# Patient Record
Sex: MALE | Race: WHITE | Employment: OTHER | URBAN - METROPOLITAN AREA
[De-identification: names, ages, dates, MRNs, and addresses within clinical notes are randomized per-mention and may not be internally consistent; named-entity substitution may affect disease eponyms.]

---

## 2019-03-13 ENCOUNTER — APPOINTMENT (OUTPATIENT)
Dept: GENERAL RADIOLOGY | Age: 79
DRG: 193 | End: 2019-03-13
Payer: MEDICARE

## 2019-03-13 ENCOUNTER — HOSPITAL ENCOUNTER (INPATIENT)
Age: 79
LOS: 9 days | Discharge: NURSING, REHAB OR LONG-TERM CARE | DRG: 193 | End: 2019-03-23
Attending: EMERGENCY MEDICINE | Admitting: INTERNAL MEDICINE
Payer: MEDICARE

## 2019-03-13 ENCOUNTER — APPOINTMENT (OUTPATIENT)
Dept: CT IMAGING | Age: 79
DRG: 193 | End: 2019-03-13
Payer: MEDICARE

## 2019-03-13 DIAGNOSIS — B34.9 VIRAL SYNDROME: Primary | ICD-10-CM

## 2019-03-13 DIAGNOSIS — R53.1 GENERAL WEAKNESS: ICD-10-CM

## 2019-03-13 LAB
ALBUMIN SERPL-MCNC: 2.5 G/DL (ref 3.5–5.2)
ALP BLD-CCNC: 162 U/L (ref 40–129)
ALT SERPL-CCNC: 20 U/L (ref 0–40)
AMMONIA: 20 UMOL/L (ref 16–60)
ANION GAP SERPL CALCULATED.3IONS-SCNC: 13 MMOL/L (ref 7–16)
APTT: 33.6 SEC (ref 24.5–35.1)
AST SERPL-CCNC: 11 U/L (ref 0–39)
BACTERIA: ABNORMAL /HPF
BASOPHILS ABSOLUTE: 0.06 E9/L (ref 0–0.2)
BASOPHILS RELATIVE PERCENT: 0.3 % (ref 0–2)
BILIRUB SERPL-MCNC: 0.5 MG/DL (ref 0–1.2)
BILIRUBIN URINE: NEGATIVE
BLOOD, URINE: ABNORMAL
BUN BLDV-MCNC: 41 MG/DL (ref 8–23)
CALCIUM SERPL-MCNC: 9.4 MG/DL (ref 8.6–10.2)
CHLORIDE BLD-SCNC: 93 MMOL/L (ref 98–107)
CLARITY: CLEAR
CO2: 26 MMOL/L (ref 22–29)
COLOR: YELLOW
CREAT SERPL-MCNC: 1 MG/DL (ref 0.7–1.2)
EOSINOPHILS ABSOLUTE: 0.14 E9/L (ref 0.05–0.5)
EOSINOPHILS RELATIVE PERCENT: 0.7 % (ref 0–6)
EPITHELIAL CELLS, UA: ABNORMAL /HPF
GFR AFRICAN AMERICAN: >60
GFR NON-AFRICAN AMERICAN: >60 ML/MIN/1.73
GLUCOSE BLD-MCNC: 159 MG/DL (ref 74–99)
GLUCOSE URINE: NEGATIVE MG/DL
HCT VFR BLD CALC: 38.1 % (ref 37–54)
HEMOGLOBIN: 12.9 G/DL (ref 12.5–16.5)
IMMATURE GRANULOCYTES #: 0.25 E9/L
IMMATURE GRANULOCYTES %: 1.3 % (ref 0–5)
INFLUENZA A BY PCR: NOT DETECTED
INFLUENZA B BY PCR: NOT DETECTED
INR BLD: 1.2
KETONES, URINE: NEGATIVE MG/DL
LACTIC ACID: 1.8 MMOL/L (ref 0.5–2.2)
LEUKOCYTE ESTERASE, URINE: NEGATIVE
LYMPHOCYTES ABSOLUTE: 0.89 E9/L (ref 1.5–4)
LYMPHOCYTES RELATIVE PERCENT: 4.7 % (ref 20–42)
MCH RBC QN AUTO: 27.4 PG (ref 26–35)
MCHC RBC AUTO-ENTMCNC: 33.9 % (ref 32–34.5)
MCV RBC AUTO: 81.1 FL (ref 80–99.9)
MONOCYTES ABSOLUTE: 1.33 E9/L (ref 0.1–0.95)
MONOCYTES RELATIVE PERCENT: 7 % (ref 2–12)
NEUTROPHILS ABSOLUTE: 16.44 E9/L (ref 1.8–7.3)
NEUTROPHILS RELATIVE PERCENT: 86 % (ref 43–80)
NITRITE, URINE: NEGATIVE
PDW BLD-RTO: 14.6 FL (ref 11.5–15)
PH UA: 6 (ref 5–9)
PLATELET # BLD: 238 E9/L (ref 130–450)
PMV BLD AUTO: 10.9 FL (ref 7–12)
POTASSIUM SERPL-SCNC: 4.8 MMOL/L (ref 3.5–5)
PRO-BNP: 1940 PG/ML (ref 0–450)
PROTEIN UA: 100 MG/DL
PROTHROMBIN TIME: 12.9 SEC (ref 9.3–12.4)
RBC # BLD: 4.7 E12/L (ref 3.8–5.8)
RBC UA: ABNORMAL /HPF (ref 0–2)
SODIUM BLD-SCNC: 132 MMOL/L (ref 132–146)
SPECIFIC GRAVITY UA: 1.02 (ref 1–1.03)
TOTAL PROTEIN: 8.3 G/DL (ref 6.4–8.3)
TROPONIN: <0.01 NG/ML (ref 0–0.03)
UROBILINOGEN, URINE: 0.2 E.U./DL
WBC # BLD: 19.1 E9/L (ref 4.5–11.5)
WBC UA: ABNORMAL /HPF (ref 0–5)

## 2019-03-13 PROCEDURE — 87798 DETECT AGENT NOS DNA AMP: CPT

## 2019-03-13 PROCEDURE — 93005 ELECTROCARDIOGRAM TRACING: CPT | Performed by: EMERGENCY MEDICINE

## 2019-03-13 PROCEDURE — 87077 CULTURE AEROBIC IDENTIFY: CPT

## 2019-03-13 PROCEDURE — 87581 M.PNEUMON DNA AMP PROBE: CPT

## 2019-03-13 PROCEDURE — 36415 COLL VENOUS BLD VENIPUNCTURE: CPT

## 2019-03-13 PROCEDURE — 87502 INFLUENZA DNA AMP PROBE: CPT

## 2019-03-13 PROCEDURE — 87040 BLOOD CULTURE FOR BACTERIA: CPT

## 2019-03-13 PROCEDURE — 83605 ASSAY OF LACTIC ACID: CPT

## 2019-03-13 PROCEDURE — 6360000002 HC RX W HCPCS: Performed by: EMERGENCY MEDICINE

## 2019-03-13 PROCEDURE — 85730 THROMBOPLASTIN TIME PARTIAL: CPT

## 2019-03-13 PROCEDURE — 87186 SC STD MICRODIL/AGAR DIL: CPT

## 2019-03-13 PROCEDURE — 6360000002 HC RX W HCPCS: Performed by: INTERNAL MEDICINE

## 2019-03-13 PROCEDURE — 84484 ASSAY OF TROPONIN QUANT: CPT

## 2019-03-13 PROCEDURE — 96372 THER/PROPH/DIAG INJ SC/IM: CPT

## 2019-03-13 PROCEDURE — G0378 HOSPITAL OBSERVATION PER HR: HCPCS

## 2019-03-13 PROCEDURE — 96374 THER/PROPH/DIAG INJ IV PUSH: CPT

## 2019-03-13 PROCEDURE — 2580000003 HC RX 258: Performed by: EMERGENCY MEDICINE

## 2019-03-13 PROCEDURE — 96365 THER/PROPH/DIAG IV INF INIT: CPT

## 2019-03-13 PROCEDURE — 96375 TX/PRO/DX INJ NEW DRUG ADDON: CPT

## 2019-03-13 PROCEDURE — 87153 DNA/RNA SEQUENCING: CPT

## 2019-03-13 PROCEDURE — 87633 RESP VIRUS 12-25 TARGETS: CPT

## 2019-03-13 PROCEDURE — 99285 EMERGENCY DEPT VISIT HI MDM: CPT

## 2019-03-13 PROCEDURE — 87088 URINE BACTERIA CULTURE: CPT

## 2019-03-13 PROCEDURE — 2500000003 HC RX 250 WO HCPCS: Performed by: INTERNAL MEDICINE

## 2019-03-13 PROCEDURE — 85610 PROTHROMBIN TIME: CPT

## 2019-03-13 PROCEDURE — 80053 COMPREHEN METABOLIC PANEL: CPT

## 2019-03-13 PROCEDURE — 71045 X-RAY EXAM CHEST 1 VIEW: CPT

## 2019-03-13 PROCEDURE — 2580000003 HC RX 258: Performed by: INTERNAL MEDICINE

## 2019-03-13 PROCEDURE — 85025 COMPLETE CBC W/AUTO DIFF WBC: CPT

## 2019-03-13 PROCEDURE — 83880 ASSAY OF NATRIURETIC PEPTIDE: CPT

## 2019-03-13 PROCEDURE — 6370000000 HC RX 637 (ALT 250 FOR IP): Performed by: EMERGENCY MEDICINE

## 2019-03-13 PROCEDURE — 87015 SPECIMEN INFECT AGNT CONCNTJ: CPT

## 2019-03-13 PROCEDURE — 81001 URINALYSIS AUTO W/SCOPE: CPT

## 2019-03-13 PROCEDURE — 87486 CHLMYD PNEUM DNA AMP PROBE: CPT

## 2019-03-13 PROCEDURE — 82140 ASSAY OF AMMONIA: CPT

## 2019-03-13 PROCEDURE — 94640 AIRWAY INHALATION TREATMENT: CPT

## 2019-03-13 PROCEDURE — 70450 CT HEAD/BRAIN W/O DYE: CPT

## 2019-03-13 RX ORDER — ONDANSETRON 2 MG/ML
4 INJECTION INTRAMUSCULAR; INTRAVENOUS ONCE
Status: COMPLETED | OUTPATIENT
Start: 2019-03-13 | End: 2019-03-13

## 2019-03-13 RX ORDER — SODIUM CHLORIDE 9 MG/ML
INJECTION, SOLUTION INTRAVENOUS CONTINUOUS
Status: DISCONTINUED | OUTPATIENT
Start: 2019-03-13 | End: 2019-03-13

## 2019-03-13 RX ORDER — 0.9 % SODIUM CHLORIDE 0.9 %
500 INTRAVENOUS SOLUTION INTRAVENOUS ONCE
Status: DISCONTINUED | OUTPATIENT
Start: 2019-03-13 | End: 2019-03-13

## 2019-03-13 RX ORDER — DEXTROSE, SODIUM CHLORIDE, AND POTASSIUM CHLORIDE 5; .45; .075 G/100ML; G/100ML; G/100ML
INJECTION INTRAVENOUS CONTINUOUS
Status: DISCONTINUED | OUTPATIENT
Start: 2019-03-13 | End: 2019-03-15

## 2019-03-13 RX ORDER — 0.9 % SODIUM CHLORIDE 0.9 %
1000 INTRAVENOUS SOLUTION INTRAVENOUS ONCE
Status: COMPLETED | OUTPATIENT
Start: 2019-03-13 | End: 2019-03-13

## 2019-03-13 RX ORDER — SODIUM CHLORIDE 0.9 % (FLUSH) 0.9 %
10 SYRINGE (ML) INJECTION PRN
Status: DISCONTINUED | OUTPATIENT
Start: 2019-03-13 | End: 2019-03-23 | Stop reason: HOSPADM

## 2019-03-13 RX ORDER — IPRATROPIUM BROMIDE AND ALBUTEROL SULFATE 2.5; .5 MG/3ML; MG/3ML
2 SOLUTION RESPIRATORY (INHALATION) ONCE
Status: COMPLETED | OUTPATIENT
Start: 2019-03-13 | End: 2019-03-13

## 2019-03-13 RX ORDER — ACETAMINOPHEN 325 MG/1
650 TABLET ORAL EVERY 4 HOURS PRN
Status: DISCONTINUED | OUTPATIENT
Start: 2019-03-13 | End: 2019-03-18 | Stop reason: SDUPTHER

## 2019-03-13 RX ORDER — ACETAMINOPHEN 500 MG
500 TABLET ORAL EVERY 6 HOURS PRN
Status: DISCONTINUED | OUTPATIENT
Start: 2019-03-13 | End: 2019-03-23 | Stop reason: HOSPADM

## 2019-03-13 RX ORDER — SODIUM CHLORIDE 0.9 % (FLUSH) 0.9 %
10 SYRINGE (ML) INJECTION EVERY 12 HOURS SCHEDULED
Status: DISCONTINUED | OUTPATIENT
Start: 2019-03-13 | End: 2019-03-23 | Stop reason: HOSPADM

## 2019-03-13 RX ORDER — ONDANSETRON 2 MG/ML
4 INJECTION INTRAMUSCULAR; INTRAVENOUS EVERY 6 HOURS PRN
Status: DISCONTINUED | OUTPATIENT
Start: 2019-03-13 | End: 2019-03-23 | Stop reason: HOSPADM

## 2019-03-13 RX ADMIN — IPRATROPIUM BROMIDE AND ALBUTEROL SULFATE 2 AMPULE: .5; 3 SOLUTION RESPIRATORY (INHALATION) at 10:47

## 2019-03-13 RX ADMIN — SODIUM CHLORIDE 1000 ML: 9 INJECTION, SOLUTION INTRAVENOUS at 13:34

## 2019-03-13 RX ADMIN — AZITHROMYCIN MONOHYDRATE 500 MG: 500 INJECTION, POWDER, LYOPHILIZED, FOR SOLUTION INTRAVENOUS at 15:14

## 2019-03-13 RX ADMIN — POTASSIUM CHLORIDE, DEXTROSE MONOHYDRATE AND SODIUM CHLORIDE: 75; 5; 450 INJECTION, SOLUTION INTRAVENOUS at 21:17

## 2019-03-13 RX ADMIN — ENOXAPARIN SODIUM 40 MG: 40 INJECTION SUBCUTANEOUS at 19:20

## 2019-03-13 RX ADMIN — SODIUM CHLORIDE 1000 ML: 9 INJECTION, SOLUTION INTRAVENOUS at 11:26

## 2019-03-13 RX ADMIN — Medication 10 ML: at 21:20

## 2019-03-13 RX ADMIN — Medication 10 ML: at 17:31

## 2019-03-13 RX ADMIN — ONDANSETRON 4 MG: 2 INJECTION INTRAMUSCULAR; INTRAVENOUS at 11:27

## 2019-03-13 RX ADMIN — WATER 2 G: 1 INJECTION INTRAMUSCULAR; INTRAVENOUS; SUBCUTANEOUS at 17:30

## 2019-03-13 SDOH — HEALTH STABILITY: MENTAL HEALTH: HOW OFTEN DO YOU HAVE A DRINK CONTAINING ALCOHOL?: NEVER

## 2019-03-13 ASSESSMENT — PAIN SCALES - GENERAL
PAINLEVEL_OUTOF10: 0

## 2019-03-13 ASSESSMENT — ENCOUNTER SYMPTOMS
CHEST TIGHTNESS: 0
BACK PAIN: 0
VOMITING: 1
SHORTNESS OF BREATH: 0
SORE THROAT: 0
SINUS PRESSURE: 0
NAUSEA: 1
RHINORRHEA: 0
COUGH: 1
WHEEZING: 0
DIARRHEA: 0

## 2019-03-14 ENCOUNTER — APPOINTMENT (OUTPATIENT)
Dept: CT IMAGING | Age: 79
DRG: 193 | End: 2019-03-14
Payer: MEDICARE

## 2019-03-14 PROBLEM — J18.9 PNEUMONIA: Status: ACTIVE | Noted: 2019-03-14

## 2019-03-14 PROBLEM — E43 SEVERE PROTEIN-CALORIE MALNUTRITION (HCC): Status: ACTIVE | Noted: 2019-03-14

## 2019-03-14 LAB
ALBUMIN SERPL-MCNC: 2.2 G/DL (ref 3.5–5.2)
ALP BLD-CCNC: 126 U/L (ref 40–129)
ALT SERPL-CCNC: 13 U/L (ref 0–40)
ANION GAP SERPL CALCULATED.3IONS-SCNC: 10 MMOL/L (ref 7–16)
AST SERPL-CCNC: 11 U/L (ref 0–39)
BASOPHILS ABSOLUTE: 0.07 E9/L (ref 0–0.2)
BASOPHILS RELATIVE PERCENT: 0.4 % (ref 0–2)
BILIRUB SERPL-MCNC: 0.3 MG/DL (ref 0–1.2)
BUN BLDV-MCNC: 26 MG/DL (ref 8–23)
BURR CELLS: ABNORMAL
CALCIUM SERPL-MCNC: 8.2 MG/DL (ref 8.6–10.2)
CHLORIDE BLD-SCNC: 96 MMOL/L (ref 98–107)
CHOLESTEROL, TOTAL: 101 MG/DL (ref 0–199)
CO2: 23 MMOL/L (ref 22–29)
CREAT SERPL-MCNC: 1 MG/DL (ref 0.7–1.2)
EOSINOPHILS ABSOLUTE: 0.72 E9/L (ref 0.05–0.5)
EOSINOPHILS RELATIVE PERCENT: 4.1 % (ref 0–6)
FILM ARRAY ADENOVIRUS: NORMAL
FILM ARRAY BORDETELLA PERTUSSIS: NORMAL
FILM ARRAY CHLAMYDOPHILIA PNEUMONIAE: NORMAL
FILM ARRAY CORONAVIRUS 229E: NORMAL
FILM ARRAY CORONAVIRUS HKU1: NORMAL
FILM ARRAY CORONAVIRUS NL63: NORMAL
FILM ARRAY CORONAVIRUS OC43: NORMAL
FILM ARRAY INFLUENZA A VIRUS 09H1: NORMAL
FILM ARRAY INFLUENZA A VIRUS H1: NORMAL
FILM ARRAY INFLUENZA A VIRUS H3: NORMAL
FILM ARRAY INFLUENZA A VIRUS: NORMAL
FILM ARRAY INFLUENZA B: NORMAL
FILM ARRAY METAPNEUMOVIRUS: NORMAL
FILM ARRAY MYCOPLASMA PNEUMONIAE: NORMAL
FILM ARRAY PARAINFLUENZA VIRUS 1: NORMAL
FILM ARRAY PARAINFLUENZA VIRUS 2: NORMAL
FILM ARRAY PARAINFLUENZA VIRUS 3: NORMAL
FILM ARRAY PARAINFLUENZA VIRUS 4: NORMAL
FILM ARRAY RESPIRATORY SYNCITIAL VIRUS: NORMAL
FILM ARRAY RHINOVIRUS/ENTEROVIRUS: NORMAL
GFR AFRICAN AMERICAN: >60
GFR NON-AFRICAN AMERICAN: >60 ML/MIN/1.73
GLUCOSE BLD-MCNC: 131 MG/DL (ref 74–99)
HCT VFR BLD CALC: 32.2 % (ref 37–54)
HDLC SERPL-MCNC: 20 MG/DL
HEMOGLOBIN: 10.7 G/DL (ref 12.5–16.5)
IMMATURE GRANULOCYTES #: 0.21 E9/L
IMMATURE GRANULOCYTES %: 1.2 % (ref 0–5)
LDL CHOLESTEROL CALCULATED: 58 MG/DL (ref 0–99)
LYMPHOCYTES ABSOLUTE: 1.35 E9/L (ref 1.5–4)
LYMPHOCYTES RELATIVE PERCENT: 7.7 % (ref 20–42)
MCH RBC QN AUTO: 27.3 PG (ref 26–35)
MCHC RBC AUTO-ENTMCNC: 33.2 % (ref 32–34.5)
MCV RBC AUTO: 82.1 FL (ref 80–99.9)
MONOCYTES ABSOLUTE: 1.95 E9/L (ref 0.1–0.95)
MONOCYTES RELATIVE PERCENT: 11.1 % (ref 2–12)
NEUTROPHILS ABSOLUTE: 13.3 E9/L (ref 1.8–7.3)
NEUTROPHILS RELATIVE PERCENT: 75.5 % (ref 43–80)
PDW BLD-RTO: 14.8 FL (ref 11.5–15)
PLATELET # BLD: 255 E9/L (ref 130–450)
PMV BLD AUTO: 10.4 FL (ref 7–12)
POIKILOCYTES: ABNORMAL
POTASSIUM SERPL-SCNC: 4.2 MMOL/L (ref 3.5–5)
RBC # BLD: 3.92 E12/L (ref 3.8–5.8)
SODIUM BLD-SCNC: 129 MMOL/L (ref 132–146)
TARGET CELLS: ABNORMAL
TOTAL PROTEIN: 6.7 G/DL (ref 6.4–8.3)
TRIGL SERPL-MCNC: 113 MG/DL (ref 0–149)
TSH SERPL DL<=0.05 MIU/L-ACNC: 0.75 UIU/ML (ref 0.27–4.2)
VLDLC SERPL CALC-MCNC: 23 MG/DL
WBC # BLD: 17.6 E9/L (ref 4.5–11.5)

## 2019-03-14 PROCEDURE — 85025 COMPLETE CBC W/AUTO DIFF WBC: CPT

## 2019-03-14 PROCEDURE — 74177 CT ABD & PELVIS W/CONTRAST: CPT

## 2019-03-14 PROCEDURE — 97162 PT EVAL MOD COMPLEX 30 MIN: CPT | Performed by: PHYSICAL THERAPIST

## 2019-03-14 PROCEDURE — 6360000002 HC RX W HCPCS: Performed by: INTERNAL MEDICINE

## 2019-03-14 PROCEDURE — 36415 COLL VENOUS BLD VENIPUNCTURE: CPT

## 2019-03-14 PROCEDURE — 80053 COMPREHEN METABOLIC PANEL: CPT

## 2019-03-14 PROCEDURE — 6360000004 HC RX CONTRAST MEDICATION: Performed by: RADIOLOGY

## 2019-03-14 PROCEDURE — 2500000003 HC RX 250 WO HCPCS: Performed by: INTERNAL MEDICINE

## 2019-03-14 PROCEDURE — 1200000000 HC SEMI PRIVATE

## 2019-03-14 PROCEDURE — 97530 THERAPEUTIC ACTIVITIES: CPT

## 2019-03-14 PROCEDURE — 97165 OT EVAL LOW COMPLEX 30 MIN: CPT

## 2019-03-14 PROCEDURE — 71260 CT THORAX DX C+: CPT

## 2019-03-14 PROCEDURE — 80061 LIPID PANEL: CPT

## 2019-03-14 PROCEDURE — 6370000000 HC RX 637 (ALT 250 FOR IP): Performed by: INTERNAL MEDICINE

## 2019-03-14 PROCEDURE — 97530 THERAPEUTIC ACTIVITIES: CPT | Performed by: PHYSICAL THERAPIST

## 2019-03-14 PROCEDURE — 2580000003 HC RX 258: Performed by: INTERNAL MEDICINE

## 2019-03-14 PROCEDURE — 84443 ASSAY THYROID STIM HORMONE: CPT

## 2019-03-14 RX ADMIN — IOHEXOL 50 ML: 240 INJECTION, SOLUTION INTRATHECAL; INTRAVASCULAR; INTRAVENOUS; ORAL at 07:18

## 2019-03-14 RX ADMIN — ENOXAPARIN SODIUM 40 MG: 40 INJECTION SUBCUTANEOUS at 12:01

## 2019-03-14 RX ADMIN — Medication 10 ML: at 21:44

## 2019-03-14 RX ADMIN — ACETAMINOPHEN 500 MG: 500 TABLET, FILM COATED ORAL at 23:56

## 2019-03-14 RX ADMIN — IOPAMIDOL 80 ML: 755 INJECTION, SOLUTION INTRAVENOUS at 07:22

## 2019-03-14 RX ADMIN — POTASSIUM CHLORIDE, DEXTROSE MONOHYDRATE AND SODIUM CHLORIDE: 75; 5; 450 INJECTION, SOLUTION INTRAVENOUS at 05:55

## 2019-03-14 RX ADMIN — POTASSIUM CHLORIDE, DEXTROSE MONOHYDRATE AND SODIUM CHLORIDE: 75; 5; 450 INJECTION, SOLUTION INTRAVENOUS at 19:18

## 2019-03-14 RX ADMIN — WATER 1 G: 1 INJECTION INTRAMUSCULAR; INTRAVENOUS; SUBCUTANEOUS at 13:35

## 2019-03-14 ASSESSMENT — PAIN SCALES - GENERAL
PAINLEVEL_OUTOF10: 10
PAINLEVEL_OUTOF10: 0
PAINLEVEL_OUTOF10: 0

## 2019-03-14 ASSESSMENT — PAIN DESCRIPTION - ORIENTATION: ORIENTATION: RIGHT;LEFT

## 2019-03-14 ASSESSMENT — PAIN DESCRIPTION - LOCATION: LOCATION: KNEE

## 2019-03-15 LAB
ANION GAP SERPL CALCULATED.3IONS-SCNC: 12 MMOL/L (ref 7–16)
BASOPHILS ABSOLUTE: 0 E9/L (ref 0–0.2)
BASOPHILS RELATIVE PERCENT: 0.3 % (ref 0–2)
BUN BLDV-MCNC: 22 MG/DL (ref 8–23)
BURR CELLS: ABNORMAL
CALCIUM SERPL-MCNC: 8.5 MG/DL (ref 8.6–10.2)
CHLORIDE BLD-SCNC: 96 MMOL/L (ref 98–107)
CO2: 22 MMOL/L (ref 22–29)
CREAT SERPL-MCNC: 0.9 MG/DL (ref 0.7–1.2)
EOSINOPHILS ABSOLUTE: 0.83 E9/L (ref 0.05–0.5)
EOSINOPHILS RELATIVE PERCENT: 4.3 % (ref 0–6)
GFR AFRICAN AMERICAN: >60
GFR NON-AFRICAN AMERICAN: >60 ML/MIN/1.73
GLUCOSE BLD-MCNC: 121 MG/DL (ref 74–99)
HCT VFR BLD CALC: 33.3 % (ref 37–54)
HEMOGLOBIN: 11.3 G/DL (ref 12.5–16.5)
LYMPHOCYTES ABSOLUTE: 1.15 E9/L (ref 1.5–4)
LYMPHOCYTES RELATIVE PERCENT: 6.1 % (ref 20–42)
MCH RBC QN AUTO: 27.9 PG (ref 26–35)
MCHC RBC AUTO-ENTMCNC: 33.9 % (ref 32–34.5)
MCV RBC AUTO: 82.2 FL (ref 80–99.9)
MONOCYTES ABSOLUTE: 1.54 E9/L (ref 0.1–0.95)
MONOCYTES RELATIVE PERCENT: 7.8 % (ref 2–12)
NEUTROPHILS ABSOLUTE: 15.74 E9/L (ref 1.8–7.3)
NEUTROPHILS RELATIVE PERCENT: 81.7 % (ref 43–80)
OVALOCYTES: ABNORMAL
PDW BLD-RTO: 15 FL (ref 11.5–15)
PLATELET # BLD: 279 E9/L (ref 130–450)
PMV BLD AUTO: 10.5 FL (ref 7–12)
POIKILOCYTES: ABNORMAL
POTASSIUM SERPL-SCNC: 4.2 MMOL/L (ref 3.5–5)
RBC # BLD: 4.05 E12/L (ref 3.8–5.8)
SODIUM BLD-SCNC: 130 MMOL/L (ref 132–146)
TARGET CELLS: ABNORMAL
TEAR DROP CELLS: ABNORMAL
URINE CULTURE, ROUTINE: NORMAL
WBC # BLD: 19.2 E9/L (ref 4.5–11.5)

## 2019-03-15 PROCEDURE — 36415 COLL VENOUS BLD VENIPUNCTURE: CPT

## 2019-03-15 PROCEDURE — 80048 BASIC METABOLIC PNL TOTAL CA: CPT

## 2019-03-15 PROCEDURE — 97530 THERAPEUTIC ACTIVITIES: CPT

## 2019-03-15 PROCEDURE — 2580000003 HC RX 258: Performed by: INTERNAL MEDICINE

## 2019-03-15 PROCEDURE — 1200000000 HC SEMI PRIVATE

## 2019-03-15 PROCEDURE — 97116 GAIT TRAINING THERAPY: CPT

## 2019-03-15 PROCEDURE — 6360000002 HC RX W HCPCS: Performed by: INTERNAL MEDICINE

## 2019-03-15 PROCEDURE — 85025 COMPLETE CBC W/AUTO DIFF WBC: CPT

## 2019-03-15 PROCEDURE — 6370000000 HC RX 637 (ALT 250 FOR IP): Performed by: INTERNAL MEDICINE

## 2019-03-15 RX ORDER — LEVOFLOXACIN 500 MG/1
500 TABLET, FILM COATED ORAL
Status: DISCONTINUED | OUTPATIENT
Start: 2019-03-15 | End: 2019-03-17

## 2019-03-15 RX ORDER — ATENOLOL 25 MG/1
25 TABLET ORAL DAILY
Status: DISCONTINUED | OUTPATIENT
Start: 2019-03-15 | End: 2019-03-16

## 2019-03-15 RX ORDER — AMLODIPINE BESYLATE 5 MG/1
2.5 TABLET ORAL DAILY
Status: DISCONTINUED | OUTPATIENT
Start: 2019-03-15 | End: 2019-03-16

## 2019-03-15 RX ADMIN — ATENOLOL 25 MG: 25 TABLET ORAL at 15:58

## 2019-03-15 RX ADMIN — AMLODIPINE BESYLATE 2.5 MG: 5 TABLET ORAL at 15:58

## 2019-03-15 RX ADMIN — ENOXAPARIN SODIUM 40 MG: 40 INJECTION SUBCUTANEOUS at 08:32

## 2019-03-15 RX ADMIN — LEVOFLOXACIN 500 MG: 500 TABLET, FILM COATED ORAL at 16:01

## 2019-03-15 RX ADMIN — Medication 10 ML: at 22:52

## 2019-03-15 ASSESSMENT — PAIN SCALES - GENERAL: PAINLEVEL_OUTOF10: 0

## 2019-03-16 ENCOUNTER — APPOINTMENT (OUTPATIENT)
Dept: GENERAL RADIOLOGY | Age: 79
DRG: 193 | End: 2019-03-16
Payer: MEDICARE

## 2019-03-16 LAB
ANION GAP SERPL CALCULATED.3IONS-SCNC: 13 MMOL/L (ref 7–16)
BASOPHILS ABSOLUTE: 0 E9/L (ref 0–0.2)
BASOPHILS RELATIVE PERCENT: 0.4 % (ref 0–2)
BUN BLDV-MCNC: 32 MG/DL (ref 8–23)
CALCIUM SERPL-MCNC: 8.5 MG/DL (ref 8.6–10.2)
CHLORIDE BLD-SCNC: 97 MMOL/L (ref 98–107)
CO2: 21 MMOL/L (ref 22–29)
CREAT SERPL-MCNC: 1.1 MG/DL (ref 0.7–1.2)
EKG ATRIAL RATE: 76 BPM
EKG P AXIS: 43 DEGREES
EKG P-R INTERVAL: 116 MS
EKG Q-T INTERVAL: 398 MS
EKG QRS DURATION: 92 MS
EKG QTC CALCULATION (BAZETT): 447 MS
EKG R AXIS: 36 DEGREES
EKG T AXIS: 48 DEGREES
EKG VENTRICULAR RATE: 76 BPM
EOSINOPHILS ABSOLUTE: 0.36 E9/L (ref 0.05–0.5)
EOSINOPHILS RELATIVE PERCENT: 1.8 % (ref 0–6)
GFR AFRICAN AMERICAN: >60
GFR NON-AFRICAN AMERICAN: >60 ML/MIN/1.73
GLUCOSE BLD-MCNC: 109 MG/DL (ref 74–99)
HCT VFR BLD CALC: 34.7 % (ref 37–54)
HEMOGLOBIN: 11.7 G/DL (ref 12.5–16.5)
LYMPHOCYTES ABSOLUTE: 1 E9/L (ref 1.5–4)
LYMPHOCYTES RELATIVE PERCENT: 5.3 % (ref 20–42)
MCH RBC QN AUTO: 27.6 PG (ref 26–35)
MCHC RBC AUTO-ENTMCNC: 33.7 % (ref 32–34.5)
MCV RBC AUTO: 81.8 FL (ref 80–99.9)
MONOCYTES ABSOLUTE: 1.4 E9/L (ref 0.1–0.95)
MONOCYTES RELATIVE PERCENT: 7 % (ref 2–12)
MYELOCYTE PERCENT: 1.8 % (ref 0–0)
NEUTROPHILS ABSOLUTE: 17.2 E9/L (ref 1.8–7.3)
NEUTROPHILS RELATIVE PERCENT: 84.2 % (ref 43–80)
NUCLEATED RED BLOOD CELLS: 0.9 /100 WBC
PDW BLD-RTO: 14.8 FL (ref 11.5–15)
PLATELET # BLD: 329 E9/L (ref 130–450)
PMV BLD AUTO: 10.2 FL (ref 7–12)
POTASSIUM SERPL-SCNC: 4.7 MMOL/L (ref 3.5–5)
RBC # BLD: 4.24 E12/L (ref 3.8–5.8)
SODIUM BLD-SCNC: 131 MMOL/L (ref 132–146)
WBC # BLD: 20 E9/L (ref 4.5–11.5)

## 2019-03-16 PROCEDURE — 80048 BASIC METABOLIC PNL TOTAL CA: CPT

## 2019-03-16 PROCEDURE — 2580000003 HC RX 258: Performed by: INTERNAL MEDICINE

## 2019-03-16 PROCEDURE — 1200000000 HC SEMI PRIVATE

## 2019-03-16 PROCEDURE — 6360000002 HC RX W HCPCS: Performed by: INTERNAL MEDICINE

## 2019-03-16 PROCEDURE — 85025 COMPLETE CBC W/AUTO DIFF WBC: CPT

## 2019-03-16 PROCEDURE — 72050 X-RAY EXAM NECK SPINE 4/5VWS: CPT

## 2019-03-16 PROCEDURE — 6370000000 HC RX 637 (ALT 250 FOR IP): Performed by: INTERNAL MEDICINE

## 2019-03-16 PROCEDURE — 36415 COLL VENOUS BLD VENIPUNCTURE: CPT

## 2019-03-16 RX ORDER — AMLODIPINE BESYLATE 5 MG/1
5 TABLET ORAL DAILY
Status: DISCONTINUED | OUTPATIENT
Start: 2019-03-17 | End: 2019-03-17

## 2019-03-16 RX ORDER — ATENOLOL 25 MG/1
37.5 TABLET ORAL DAILY
Status: DISCONTINUED | OUTPATIENT
Start: 2019-03-17 | End: 2019-03-23 | Stop reason: HOSPADM

## 2019-03-16 RX ADMIN — ENOXAPARIN SODIUM 40 MG: 40 INJECTION SUBCUTANEOUS at 09:42

## 2019-03-16 RX ADMIN — WATER 1 G: 1 INJECTION INTRAMUSCULAR; INTRAVENOUS; SUBCUTANEOUS at 15:34

## 2019-03-16 RX ADMIN — Medication 10 ML: at 13:48

## 2019-03-16 RX ADMIN — Medication 10 ML: at 21:18

## 2019-03-16 RX ADMIN — AMLODIPINE BESYLATE 2.5 MG: 5 TABLET ORAL at 09:42

## 2019-03-16 RX ADMIN — ATENOLOL 25 MG: 25 TABLET ORAL at 09:42

## 2019-03-16 ASSESSMENT — PAIN SCALES - GENERAL
PAINLEVEL_OUTOF10: 0
PAINLEVEL_OUTOF10: 0

## 2019-03-17 LAB
BASOPHILS ABSOLUTE: 0 E9/L (ref 0–0.2)
BASOPHILS RELATIVE PERCENT: 0.3 % (ref 0–2)
BURR CELLS: ABNORMAL
EOSINOPHILS ABSOLUTE: 0.36 E9/L (ref 0.05–0.5)
EOSINOPHILS RELATIVE PERCENT: 1.8 % (ref 0–6)
HCT VFR BLD CALC: 36.1 % (ref 37–54)
HEMOGLOBIN: 12.2 G/DL (ref 12.5–16.5)
LYMPHOCYTES ABSOLUTE: 1.41 E9/L (ref 1.5–4)
LYMPHOCYTES RELATIVE PERCENT: 7 % (ref 20–42)
MCH RBC QN AUTO: 28.2 PG (ref 26–35)
MCHC RBC AUTO-ENTMCNC: 33.8 % (ref 32–34.5)
MCV RBC AUTO: 83.4 FL (ref 80–99.9)
MONOCYTES ABSOLUTE: 1.21 E9/L (ref 0.1–0.95)
MONOCYTES RELATIVE PERCENT: 6.1 % (ref 2–12)
MYELOCYTE PERCENT: 0.9 % (ref 0–0)
NEUTROPHILS ABSOLUTE: 17.09 E9/L (ref 1.8–7.3)
NEUTROPHILS RELATIVE PERCENT: 84.2 % (ref 43–80)
OVALOCYTES: ABNORMAL
PDW BLD-RTO: 15.1 FL (ref 11.5–15)
PLATELET # BLD: 379 E9/L (ref 130–450)
PMV BLD AUTO: 10.1 FL (ref 7–12)
POIKILOCYTES: ABNORMAL
RBC # BLD: 4.33 E12/L (ref 3.8–5.8)
SEDIMENTATION RATE, ERYTHROCYTE: 87 MM/HR (ref 0–15)
WBC # BLD: 20.1 E9/L (ref 4.5–11.5)

## 2019-03-17 PROCEDURE — 36415 COLL VENOUS BLD VENIPUNCTURE: CPT

## 2019-03-17 PROCEDURE — 85651 RBC SED RATE NONAUTOMATED: CPT

## 2019-03-17 PROCEDURE — 85025 COMPLETE CBC W/AUTO DIFF WBC: CPT

## 2019-03-17 PROCEDURE — 6370000000 HC RX 637 (ALT 250 FOR IP): Performed by: INTERNAL MEDICINE

## 2019-03-17 PROCEDURE — 2580000003 HC RX 258: Performed by: INTERNAL MEDICINE

## 2019-03-17 PROCEDURE — 1200000000 HC SEMI PRIVATE

## 2019-03-17 PROCEDURE — 6360000002 HC RX W HCPCS: Performed by: INTERNAL MEDICINE

## 2019-03-17 RX ORDER — AMLODIPINE BESYLATE 5 MG/1
5 TABLET ORAL ONCE
Status: COMPLETED | OUTPATIENT
Start: 2019-03-17 | End: 2019-03-17

## 2019-03-17 RX ORDER — LEVOFLOXACIN 500 MG/1
500 TABLET, FILM COATED ORAL DAILY
Status: DISCONTINUED | OUTPATIENT
Start: 2019-03-18 | End: 2019-03-23 | Stop reason: HOSPADM

## 2019-03-17 RX ORDER — DOCUSATE SODIUM 100 MG/1
100 CAPSULE, LIQUID FILLED ORAL DAILY
Status: DISCONTINUED | OUTPATIENT
Start: 2019-03-17 | End: 2019-03-23 | Stop reason: HOSPADM

## 2019-03-17 RX ORDER — AMLODIPINE BESYLATE 10 MG/1
10 TABLET ORAL DAILY
Status: DISCONTINUED | OUTPATIENT
Start: 2019-03-18 | End: 2019-03-23 | Stop reason: HOSPADM

## 2019-03-17 RX ADMIN — Medication 10 ML: at 09:19

## 2019-03-17 RX ADMIN — DOCUSATE SODIUM 100 MG: 100 CAPSULE, LIQUID FILLED ORAL at 22:06

## 2019-03-17 RX ADMIN — ATENOLOL 37.5 MG: 25 TABLET ORAL at 09:18

## 2019-03-17 RX ADMIN — ENOXAPARIN SODIUM 40 MG: 40 INJECTION SUBCUTANEOUS at 09:19

## 2019-03-17 RX ADMIN — Medication 10 ML: at 19:46

## 2019-03-17 RX ADMIN — AMLODIPINE BESYLATE 5 MG: 5 TABLET ORAL at 14:54

## 2019-03-17 RX ADMIN — AMLODIPINE BESYLATE 5 MG: 5 TABLET ORAL at 09:19

## 2019-03-17 RX ADMIN — Medication 10 ML: at 15:05

## 2019-03-17 RX ADMIN — WATER 1 G: 1 INJECTION INTRAMUSCULAR; INTRAVENOUS; SUBCUTANEOUS at 15:05

## 2019-03-17 ASSESSMENT — PAIN SCALES - GENERAL
PAINLEVEL_OUTOF10: 0

## 2019-03-18 ENCOUNTER — APPOINTMENT (OUTPATIENT)
Dept: GENERAL RADIOLOGY | Age: 79
DRG: 193 | End: 2019-03-18
Payer: MEDICARE

## 2019-03-18 LAB
ANION GAP SERPL CALCULATED.3IONS-SCNC: 12 MMOL/L (ref 7–16)
BASOPHILS ABSOLUTE: 0 E9/L (ref 0–0.2)
BASOPHILS RELATIVE PERCENT: 0.4 % (ref 0–2)
BUN BLDV-MCNC: 34 MG/DL (ref 8–23)
CALCIUM SERPL-MCNC: 8.7 MG/DL (ref 8.6–10.2)
CHLORIDE BLD-SCNC: 97 MMOL/L (ref 98–107)
CO2: 23 MMOL/L (ref 22–29)
CREAT SERPL-MCNC: 1 MG/DL (ref 0.7–1.2)
CULTURE, BLOOD 2: NORMAL
EOSINOPHILS ABSOLUTE: 0 E9/L (ref 0.05–0.5)
EOSINOPHILS RELATIVE PERCENT: 1.2 % (ref 0–6)
GFR AFRICAN AMERICAN: >60
GFR NON-AFRICAN AMERICAN: >60 ML/MIN/1.73
GLUCOSE BLD-MCNC: 107 MG/DL (ref 74–99)
HCT VFR BLD CALC: 38.1 % (ref 37–54)
HEMOGLOBIN: 12.7 G/DL (ref 12.5–16.5)
LYMPHOCYTES ABSOLUTE: 1.08 E9/L (ref 1.5–4)
LYMPHOCYTES RELATIVE PERCENT: 6.1 % (ref 20–42)
MCH RBC QN AUTO: 28 PG (ref 26–35)
MCHC RBC AUTO-ENTMCNC: 33.3 % (ref 32–34.5)
MCV RBC AUTO: 83.9 FL (ref 80–99.9)
MONOCYTES ABSOLUTE: 1.08 E9/L (ref 0.1–0.95)
MONOCYTES RELATIVE PERCENT: 6.1 % (ref 2–12)
MYELOCYTE PERCENT: 1.7 % (ref 0–0)
NEUTROPHILS ABSOLUTE: 15.84 E9/L (ref 1.8–7.3)
NEUTROPHILS RELATIVE PERCENT: 86.1 % (ref 43–80)
PDW BLD-RTO: 15 FL (ref 11.5–15)
PLATELET # BLD: 397 E9/L (ref 130–450)
PMV BLD AUTO: 9.9 FL (ref 7–12)
POTASSIUM SERPL-SCNC: 4.4 MMOL/L (ref 3.5–5)
PROCALCITONIN: 0.26 NG/ML (ref 0–0.08)
RBC # BLD: 4.54 E12/L (ref 3.8–5.8)
SODIUM BLD-SCNC: 132 MMOL/L (ref 132–146)
WBC # BLD: 18 E9/L (ref 4.5–11.5)

## 2019-03-18 PROCEDURE — 92526 ORAL FUNCTION THERAPY: CPT | Performed by: SPEECH-LANGUAGE PATHOLOGIST

## 2019-03-18 PROCEDURE — 84145 PROCALCITONIN (PCT): CPT

## 2019-03-18 PROCEDURE — 1200000000 HC SEMI PRIVATE

## 2019-03-18 PROCEDURE — 6360000002 HC RX W HCPCS: Performed by: INTERNAL MEDICINE

## 2019-03-18 PROCEDURE — 92611 MOTION FLUOROSCOPY/SWALLOW: CPT | Performed by: SPEECH-LANGUAGE PATHOLOGIST

## 2019-03-18 PROCEDURE — 74230 X-RAY XM SWLNG FUNCJ C+: CPT

## 2019-03-18 PROCEDURE — 85025 COMPLETE CBC W/AUTO DIFF WBC: CPT

## 2019-03-18 PROCEDURE — 80048 BASIC METABOLIC PNL TOTAL CA: CPT

## 2019-03-18 PROCEDURE — 97116 GAIT TRAINING THERAPY: CPT

## 2019-03-18 PROCEDURE — 2500000003 HC RX 250 WO HCPCS: Performed by: INTERNAL MEDICINE

## 2019-03-18 PROCEDURE — 97110 THERAPEUTIC EXERCISES: CPT

## 2019-03-18 PROCEDURE — 6370000000 HC RX 637 (ALT 250 FOR IP): Performed by: INTERNAL MEDICINE

## 2019-03-18 PROCEDURE — 36415 COLL VENOUS BLD VENIPUNCTURE: CPT

## 2019-03-18 PROCEDURE — 2580000003 HC RX 258: Performed by: INTERNAL MEDICINE

## 2019-03-18 PROCEDURE — 6370000000 HC RX 637 (ALT 250 FOR IP): Performed by: CLINICAL NURSE SPECIALIST

## 2019-03-18 RX ADMIN — Medication 10 ML: at 09:12

## 2019-03-18 RX ADMIN — ENOXAPARIN SODIUM 40 MG: 40 INJECTION SUBCUTANEOUS at 09:11

## 2019-03-18 RX ADMIN — Medication 10 ML: at 20:30

## 2019-03-18 RX ADMIN — AMLODIPINE BESYLATE 10 MG: 10 TABLET ORAL at 09:12

## 2019-03-18 RX ADMIN — LEVOFLOXACIN 500 MG: 500 TABLET, FILM COATED ORAL at 09:12

## 2019-03-18 RX ADMIN — BARIUM SULFATE 45 G: 0.6 CREAM ORAL at 13:42

## 2019-03-18 RX ADMIN — BARIUM SULFATE 88 G: 960 POWDER, FOR SUSPENSION ORAL at 13:41

## 2019-03-18 RX ADMIN — DOCUSATE SODIUM 100 MG: 100 CAPSULE, LIQUID FILLED ORAL at 09:12

## 2019-03-18 RX ADMIN — ATENOLOL 37.5 MG: 25 TABLET ORAL at 09:12

## 2019-03-18 RX ADMIN — WATER 1 G: 1 INJECTION INTRAMUSCULAR; INTRAVENOUS; SUBCUTANEOUS at 15:09

## 2019-03-18 RX ADMIN — Medication 10 ML: at 15:09

## 2019-03-18 ASSESSMENT — PAIN SCALES - GENERAL: PAINLEVEL_OUTOF10: 0

## 2019-03-19 ENCOUNTER — APPOINTMENT (OUTPATIENT)
Dept: GENERAL RADIOLOGY | Age: 79
DRG: 193 | End: 2019-03-19
Payer: MEDICARE

## 2019-03-19 PROCEDURE — 51798 US URINE CAPACITY MEASURE: CPT

## 2019-03-19 PROCEDURE — 1200000000 HC SEMI PRIVATE

## 2019-03-19 PROCEDURE — 6360000002 HC RX W HCPCS: Performed by: INTERNAL MEDICINE

## 2019-03-19 PROCEDURE — 97530 THERAPEUTIC ACTIVITIES: CPT

## 2019-03-19 PROCEDURE — 97110 THERAPEUTIC EXERCISES: CPT

## 2019-03-19 PROCEDURE — 87040 BLOOD CULTURE FOR BACTERIA: CPT

## 2019-03-19 PROCEDURE — 6360000002 HC RX W HCPCS: Performed by: SPECIALIST

## 2019-03-19 PROCEDURE — 2580000003 HC RX 258: Performed by: SPECIALIST

## 2019-03-19 PROCEDURE — 72050 X-RAY EXAM NECK SPINE 4/5VWS: CPT

## 2019-03-19 PROCEDURE — 6370000000 HC RX 637 (ALT 250 FOR IP): Performed by: INTERNAL MEDICINE

## 2019-03-19 PROCEDURE — 2580000003 HC RX 258: Performed by: INTERNAL MEDICINE

## 2019-03-19 PROCEDURE — 92526 ORAL FUNCTION THERAPY: CPT | Performed by: SPEECH-LANGUAGE PATHOLOGIST

## 2019-03-19 PROCEDURE — 6370000000 HC RX 637 (ALT 250 FOR IP): Performed by: CLINICAL NURSE SPECIALIST

## 2019-03-19 PROCEDURE — 36415 COLL VENOUS BLD VENIPUNCTURE: CPT

## 2019-03-19 RX ADMIN — ENOXAPARIN SODIUM 40 MG: 40 INJECTION SUBCUTANEOUS at 09:47

## 2019-03-19 RX ADMIN — WATER 2 G: 1 INJECTION INTRAMUSCULAR; INTRAVENOUS; SUBCUTANEOUS at 16:10

## 2019-03-19 RX ADMIN — AMLODIPINE BESYLATE 10 MG: 10 TABLET ORAL at 09:48

## 2019-03-19 RX ADMIN — LEVOFLOXACIN 500 MG: 500 TABLET, FILM COATED ORAL at 09:48

## 2019-03-19 RX ADMIN — ATENOLOL 37.5 MG: 25 TABLET ORAL at 09:47

## 2019-03-19 RX ADMIN — Medication 10 ML: at 19:56

## 2019-03-19 RX ADMIN — DOCUSATE SODIUM 100 MG: 100 CAPSULE, LIQUID FILLED ORAL at 09:48

## 2019-03-19 RX ADMIN — Medication 10 ML: at 09:47

## 2019-03-19 RX ADMIN — Medication 10 ML: at 16:11

## 2019-03-19 ASSESSMENT — PAIN SCALES - GENERAL
PAINLEVEL_OUTOF10: 0

## 2019-03-20 LAB
BASOPHILS ABSOLUTE: 0.11 E9/L (ref 0–0.2)
BASOPHILS RELATIVE PERCENT: 0.7 % (ref 0–2)
BLOOD CULTURE, ROUTINE: ABNORMAL
BLOOD CULTURE, ROUTINE: ABNORMAL
EOSINOPHILS ABSOLUTE: 0.13 E9/L (ref 0.05–0.5)
EOSINOPHILS RELATIVE PERCENT: 0.8 % (ref 0–6)
FERRITIN: 486 NG/ML
FOLATE: 5.2 NG/ML (ref 4.8–24.2)
HCT VFR BLD CALC: 37.6 % (ref 37–54)
HEMOGLOBIN: 12.6 G/DL (ref 12.5–16.5)
IMMATURE GRANULOCYTES #: 0.3 E9/L
IMMATURE GRANULOCYTES %: 1.8 % (ref 0–5)
INR BLD: 1.2
IRON SATURATION: 18 % (ref 20–55)
IRON: 32 MCG/DL (ref 59–158)
LYMPHOCYTES ABSOLUTE: 1.81 E9/L (ref 1.5–4)
LYMPHOCYTES RELATIVE PERCENT: 10.8 % (ref 20–42)
MCH RBC QN AUTO: 27.6 PG (ref 26–35)
MCHC RBC AUTO-ENTMCNC: 33.5 % (ref 32–34.5)
MCV RBC AUTO: 82.3 FL (ref 80–99.9)
MONOCYTES ABSOLUTE: 1.47 E9/L (ref 0.1–0.95)
MONOCYTES RELATIVE PERCENT: 8.8 % (ref 2–12)
NEUTROPHILS ABSOLUTE: 12.9 E9/L (ref 1.8–7.3)
NEUTROPHILS RELATIVE PERCENT: 77.1 % (ref 43–80)
ORGANISM: ABNORMAL
PDW BLD-RTO: 14.6 FL (ref 11.5–15)
PLATELET # BLD: 442 E9/L (ref 130–450)
PMV BLD AUTO: 9.6 FL (ref 7–12)
PROTHROMBIN TIME: 13.8 SEC (ref 9.3–12.4)
RBC # BLD: 4.57 E12/L (ref 3.8–5.8)
TOTAL IRON BINDING CAPACITY: 178 MCG/DL (ref 250–450)
VITAMIN B-12: 1211 PG/ML (ref 211–946)
WBC # BLD: 16.7 E9/L (ref 4.5–11.5)

## 2019-03-20 PROCEDURE — 1200000000 HC SEMI PRIVATE

## 2019-03-20 PROCEDURE — 6370000000 HC RX 637 (ALT 250 FOR IP): Performed by: INTERNAL MEDICINE

## 2019-03-20 PROCEDURE — 85025 COMPLETE CBC W/AUTO DIFF WBC: CPT

## 2019-03-20 PROCEDURE — 97530 THERAPEUTIC ACTIVITIES: CPT

## 2019-03-20 PROCEDURE — 82607 VITAMIN B-12: CPT

## 2019-03-20 PROCEDURE — 2580000003 HC RX 258: Performed by: INTERNAL MEDICINE

## 2019-03-20 PROCEDURE — 36415 COLL VENOUS BLD VENIPUNCTURE: CPT

## 2019-03-20 PROCEDURE — 85610 PROTHROMBIN TIME: CPT

## 2019-03-20 PROCEDURE — 83550 IRON BINDING TEST: CPT

## 2019-03-20 PROCEDURE — 83540 ASSAY OF IRON: CPT

## 2019-03-20 PROCEDURE — 97535 SELF CARE MNGMENT TRAINING: CPT

## 2019-03-20 PROCEDURE — 82728 ASSAY OF FERRITIN: CPT

## 2019-03-20 PROCEDURE — 6370000000 HC RX 637 (ALT 250 FOR IP): Performed by: CLINICAL NURSE SPECIALIST

## 2019-03-20 PROCEDURE — 92526 ORAL FUNCTION THERAPY: CPT | Performed by: SPEECH-LANGUAGE PATHOLOGIST

## 2019-03-20 PROCEDURE — 82746 ASSAY OF FOLIC ACID SERUM: CPT

## 2019-03-20 PROCEDURE — 6360000002 HC RX W HCPCS: Performed by: INTERNAL MEDICINE

## 2019-03-20 RX ORDER — LEVOFLOXACIN 500 MG/1
500 TABLET, FILM COATED ORAL DAILY
Qty: 14 TABLET | Refills: 0 | Status: SHIPPED | OUTPATIENT
Start: 2019-03-21 | End: 2019-04-04

## 2019-03-20 RX ADMIN — ATENOLOL 37.5 MG: 25 TABLET ORAL at 09:42

## 2019-03-20 RX ADMIN — AMLODIPINE BESYLATE 10 MG: 10 TABLET ORAL at 09:42

## 2019-03-20 RX ADMIN — Medication 10 ML: at 09:41

## 2019-03-20 RX ADMIN — Medication 10 ML: at 20:03

## 2019-03-20 RX ADMIN — LEVOFLOXACIN 500 MG: 500 TABLET, FILM COATED ORAL at 09:42

## 2019-03-20 RX ADMIN — ENOXAPARIN SODIUM 40 MG: 40 INJECTION SUBCUTANEOUS at 09:42

## 2019-03-20 RX ADMIN — DOCUSATE SODIUM 100 MG: 100 CAPSULE, LIQUID FILLED ORAL at 09:43

## 2019-03-20 ASSESSMENT — PAIN SCALES - GENERAL
PAINLEVEL_OUTOF10: 0

## 2019-03-21 ENCOUNTER — ANESTHESIA EVENT (OUTPATIENT)
Dept: ENDOSCOPY | Age: 79
DRG: 193 | End: 2019-03-21
Payer: MEDICARE

## 2019-03-21 ENCOUNTER — ANESTHESIA (OUTPATIENT)
Dept: ENDOSCOPY | Age: 79
DRG: 193 | End: 2019-03-21
Payer: MEDICARE

## 2019-03-21 VITALS — OXYGEN SATURATION: 98 % | SYSTOLIC BLOOD PRESSURE: 132 MMHG | DIASTOLIC BLOOD PRESSURE: 51 MMHG

## 2019-03-21 LAB
ANION GAP SERPL CALCULATED.3IONS-SCNC: 12 MMOL/L (ref 7–16)
BASOPHILS ABSOLUTE: 0.09 E9/L (ref 0–0.2)
BASOPHILS RELATIVE PERCENT: 0.6 % (ref 0–2)
BUN BLDV-MCNC: 32 MG/DL (ref 8–23)
CALCIUM SERPL-MCNC: 8.4 MG/DL (ref 8.6–10.2)
CHLORIDE BLD-SCNC: 96 MMOL/L (ref 98–107)
CO2: 25 MMOL/L (ref 22–29)
CREAT SERPL-MCNC: 1.1 MG/DL (ref 0.7–1.2)
EOSINOPHILS ABSOLUTE: 0.13 E9/L (ref 0.05–0.5)
EOSINOPHILS RELATIVE PERCENT: 0.8 % (ref 0–6)
GFR AFRICAN AMERICAN: >60
GFR NON-AFRICAN AMERICAN: >60 ML/MIN/1.73
GLUCOSE BLD-MCNC: 109 MG/DL (ref 74–99)
HCT VFR BLD CALC: 37.6 % (ref 37–54)
HEMOGLOBIN: 12.4 G/DL (ref 12.5–16.5)
IMMATURE GRANULOCYTES #: 0.26 E9/L
IMMATURE GRANULOCYTES %: 1.6 % (ref 0–5)
LYMPHOCYTES ABSOLUTE: 1.82 E9/L (ref 1.5–4)
LYMPHOCYTES RELATIVE PERCENT: 11.3 % (ref 20–42)
MCH RBC QN AUTO: 27.4 PG (ref 26–35)
MCHC RBC AUTO-ENTMCNC: 33 % (ref 32–34.5)
MCV RBC AUTO: 83.2 FL (ref 80–99.9)
MONOCYTES ABSOLUTE: 1.41 E9/L (ref 0.1–0.95)
MONOCYTES RELATIVE PERCENT: 8.8 % (ref 2–12)
NEUTROPHILS ABSOLUTE: 12.33 E9/L (ref 1.8–7.3)
NEUTROPHILS RELATIVE PERCENT: 76.9 % (ref 43–80)
PDW BLD-RTO: 14.6 FL (ref 11.5–15)
PLATELET # BLD: 434 E9/L (ref 130–450)
PMV BLD AUTO: 9.5 FL (ref 7–12)
POTASSIUM SERPL-SCNC: 4.2 MMOL/L (ref 3.5–5)
RBC # BLD: 4.52 E12/L (ref 3.8–5.8)
SODIUM BLD-SCNC: 133 MMOL/L (ref 132–146)
WBC # BLD: 16 E9/L (ref 4.5–11.5)

## 2019-03-21 PROCEDURE — 2709999900 HC NON-CHARGEABLE SUPPLY: Performed by: INTERNAL MEDICINE

## 2019-03-21 PROCEDURE — 36415 COLL VENOUS BLD VENIPUNCTURE: CPT

## 2019-03-21 PROCEDURE — 2580000003 HC RX 258: Performed by: NURSE ANESTHETIST, CERTIFIED REGISTERED

## 2019-03-21 PROCEDURE — 3700000001 HC ADD 15 MINUTES (ANESTHESIA): Performed by: INTERNAL MEDICINE

## 2019-03-21 PROCEDURE — 0D758ZZ DILATION OF ESOPHAGUS, VIA NATURAL OR ARTIFICIAL OPENING ENDOSCOPIC: ICD-10-PCS | Performed by: INTERNAL MEDICINE

## 2019-03-21 PROCEDURE — 3609012500 HC EGD DILATION BALLOON: Performed by: INTERNAL MEDICINE

## 2019-03-21 PROCEDURE — 97530 THERAPEUTIC ACTIVITIES: CPT

## 2019-03-21 PROCEDURE — 6360000002 HC RX W HCPCS: Performed by: NURSE ANESTHETIST, CERTIFIED REGISTERED

## 2019-03-21 PROCEDURE — 85025 COMPLETE CBC W/AUTO DIFF WBC: CPT

## 2019-03-21 PROCEDURE — 2580000003 HC RX 258: Performed by: INTERNAL MEDICINE

## 2019-03-21 PROCEDURE — 7100000011 HC PHASE II RECOVERY - ADDTL 15 MIN: Performed by: INTERNAL MEDICINE

## 2019-03-21 PROCEDURE — 80048 BASIC METABOLIC PNL TOTAL CA: CPT

## 2019-03-21 PROCEDURE — 0DB58ZX EXCISION OF ESOPHAGUS, VIA NATURAL OR ARTIFICIAL OPENING ENDOSCOPIC, DIAGNOSTIC: ICD-10-PCS | Performed by: INTERNAL MEDICINE

## 2019-03-21 PROCEDURE — 1200000000 HC SEMI PRIVATE

## 2019-03-21 PROCEDURE — 92526 ORAL FUNCTION THERAPY: CPT | Performed by: SPEECH-LANGUAGE PATHOLOGIST

## 2019-03-21 PROCEDURE — 0DB68ZX EXCISION OF STOMACH, VIA NATURAL OR ARTIFICIAL OPENING ENDOSCOPIC, DIAGNOSTIC: ICD-10-PCS | Performed by: INTERNAL MEDICINE

## 2019-03-21 PROCEDURE — 3609012400 HC EGD TRANSORAL BIOPSY SINGLE/MULTIPLE: Performed by: INTERNAL MEDICINE

## 2019-03-21 PROCEDURE — 3700000000 HC ANESTHESIA ATTENDED CARE: Performed by: INTERNAL MEDICINE

## 2019-03-21 PROCEDURE — 0DB98ZX EXCISION OF DUODENUM, VIA NATURAL OR ARTIFICIAL OPENING ENDOSCOPIC, DIAGNOSTIC: ICD-10-PCS | Performed by: INTERNAL MEDICINE

## 2019-03-21 PROCEDURE — 88312 SPECIAL STAINS GROUP 1: CPT

## 2019-03-21 PROCEDURE — 7100000010 HC PHASE II RECOVERY - FIRST 15 MIN: Performed by: INTERNAL MEDICINE

## 2019-03-21 PROCEDURE — 88305 TISSUE EXAM BY PATHOLOGIST: CPT

## 2019-03-21 PROCEDURE — C1726 CATH, BAL DIL, NON-VASCULAR: HCPCS | Performed by: INTERNAL MEDICINE

## 2019-03-21 PROCEDURE — 88342 IMHCHEM/IMCYTCHM 1ST ANTB: CPT

## 2019-03-21 PROCEDURE — 2580000003 HC RX 258: Performed by: STUDENT IN AN ORGANIZED HEALTH CARE EDUCATION/TRAINING PROGRAM

## 2019-03-21 RX ORDER — PROPOFOL 10 MG/ML
INJECTION, EMULSION INTRAVENOUS PRN
Status: DISCONTINUED | OUTPATIENT
Start: 2019-03-21 | End: 2019-03-21 | Stop reason: SDUPTHER

## 2019-03-21 RX ORDER — L. ACIDOPHILUS/L.BULGARICUS 1MM CELL
4 TABLET ORAL 3 TIMES DAILY
Status: DISCONTINUED | OUTPATIENT
Start: 2019-03-21 | End: 2019-03-21 | Stop reason: CLARIF

## 2019-03-21 RX ORDER — LACTOBACILLUS RHAMNOSUS GG 10B CELL
1 CAPSULE ORAL DAILY
Status: DISCONTINUED | OUTPATIENT
Start: 2019-03-21 | End: 2019-03-23 | Stop reason: HOSPADM

## 2019-03-21 RX ORDER — L. ACIDOPHILUS/L.BULGARICUS 1MM CELL
4 TABLET ORAL 3 TIMES DAILY
Qty: 360 TABLET | Refills: 0 | Status: SHIPPED | OUTPATIENT
Start: 2019-03-21 | End: 2019-04-20

## 2019-03-21 RX ORDER — SODIUM CHLORIDE, SODIUM LACTATE, POTASSIUM CHLORIDE, CALCIUM CHLORIDE 600; 310; 30; 20 MG/100ML; MG/100ML; MG/100ML; MG/100ML
INJECTION, SOLUTION INTRAVENOUS CONTINUOUS PRN
Status: DISCONTINUED | OUTPATIENT
Start: 2019-03-21 | End: 2019-03-21 | Stop reason: SDUPTHER

## 2019-03-21 RX ORDER — PANTOPRAZOLE SODIUM 40 MG/1
40 TABLET, DELAYED RELEASE ORAL
Status: DISCONTINUED | OUTPATIENT
Start: 2019-03-22 | End: 2019-03-23 | Stop reason: HOSPADM

## 2019-03-21 RX ORDER — SODIUM CHLORIDE, SODIUM LACTATE, POTASSIUM CHLORIDE, CALCIUM CHLORIDE 600; 310; 30; 20 MG/100ML; MG/100ML; MG/100ML; MG/100ML
INJECTION, SOLUTION INTRAVENOUS CONTINUOUS
Status: DISCONTINUED | OUTPATIENT
Start: 2019-03-21 | End: 2019-03-23 | Stop reason: HOSPADM

## 2019-03-21 RX ADMIN — SODIUM CHLORIDE, POTASSIUM CHLORIDE, SODIUM LACTATE AND CALCIUM CHLORIDE: 600; 310; 30; 20 INJECTION, SOLUTION INTRAVENOUS at 20:16

## 2019-03-21 RX ADMIN — Medication 10 ML: at 11:28

## 2019-03-21 RX ADMIN — SODIUM CHLORIDE, POTASSIUM CHLORIDE, SODIUM LACTATE AND CALCIUM CHLORIDE: 600; 310; 30; 20 INJECTION, SOLUTION INTRAVENOUS at 11:28

## 2019-03-21 RX ADMIN — PROPOFOL 150 MG: 10 INJECTION, EMULSION INTRAVENOUS at 17:08

## 2019-03-21 RX ADMIN — SODIUM CHLORIDE, POTASSIUM CHLORIDE, SODIUM LACTATE AND CALCIUM CHLORIDE: 600; 310; 30; 20 INJECTION, SOLUTION INTRAVENOUS at 16:49

## 2019-03-21 ASSESSMENT — LIFESTYLE VARIABLES: SMOKING_STATUS: 1

## 2019-03-21 ASSESSMENT — PAIN SCALES - GENERAL
PAINLEVEL_OUTOF10: 0

## 2019-03-22 PROCEDURE — 1200000000 HC SEMI PRIVATE

## 2019-03-22 PROCEDURE — 2580000003 HC RX 258: Performed by: STUDENT IN AN ORGANIZED HEALTH CARE EDUCATION/TRAINING PROGRAM

## 2019-03-22 PROCEDURE — 6360000002 HC RX W HCPCS: Performed by: INTERNAL MEDICINE

## 2019-03-22 PROCEDURE — 6370000000 HC RX 637 (ALT 250 FOR IP): Performed by: STUDENT IN AN ORGANIZED HEALTH CARE EDUCATION/TRAINING PROGRAM

## 2019-03-22 PROCEDURE — 6370000000 HC RX 637 (ALT 250 FOR IP): Performed by: INTERNAL MEDICINE

## 2019-03-22 PROCEDURE — 2580000003 HC RX 258: Performed by: INTERNAL MEDICINE

## 2019-03-22 PROCEDURE — 97530 THERAPEUTIC ACTIVITIES: CPT

## 2019-03-22 PROCEDURE — 6370000000 HC RX 637 (ALT 250 FOR IP): Performed by: SPECIALIST

## 2019-03-22 PROCEDURE — 92526 ORAL FUNCTION THERAPY: CPT | Performed by: SPEECH-LANGUAGE PATHOLOGIST

## 2019-03-22 PROCEDURE — 6370000000 HC RX 637 (ALT 250 FOR IP): Performed by: CLINICAL NURSE SPECIALIST

## 2019-03-22 RX ADMIN — AMLODIPINE BESYLATE 10 MG: 10 TABLET ORAL at 08:08

## 2019-03-22 RX ADMIN — PANTOPRAZOLE SODIUM 40 MG: 40 TABLET, DELAYED RELEASE ORAL at 06:14

## 2019-03-22 RX ADMIN — ENOXAPARIN SODIUM 40 MG: 40 INJECTION SUBCUTANEOUS at 08:08

## 2019-03-22 RX ADMIN — ATENOLOL 37.5 MG: 25 TABLET ORAL at 08:07

## 2019-03-22 RX ADMIN — DOCUSATE SODIUM 100 MG: 100 CAPSULE, LIQUID FILLED ORAL at 08:08

## 2019-03-22 RX ADMIN — SODIUM CHLORIDE, POTASSIUM CHLORIDE, SODIUM LACTATE AND CALCIUM CHLORIDE: 600; 310; 30; 20 INJECTION, SOLUTION INTRAVENOUS at 06:14

## 2019-03-22 RX ADMIN — LEVOFLOXACIN 500 MG: 500 TABLET, FILM COATED ORAL at 08:08

## 2019-03-22 RX ADMIN — Medication 10 ML: at 08:07

## 2019-03-22 RX ADMIN — Medication 1 CAPSULE: at 08:08

## 2019-03-22 ASSESSMENT — PAIN SCALES - GENERAL
PAINLEVEL_OUTOF10: 0
PAINLEVEL_OUTOF10: 0

## 2019-03-23 VITALS
DIASTOLIC BLOOD PRESSURE: 52 MMHG | RESPIRATION RATE: 16 BRPM | OXYGEN SATURATION: 98 % | HEIGHT: 64 IN | BODY MASS INDEX: 19.12 KG/M2 | SYSTOLIC BLOOD PRESSURE: 111 MMHG | HEART RATE: 61 BPM | WEIGHT: 112 LBS | TEMPERATURE: 98.9 F

## 2019-03-23 LAB
BASOPHILS ABSOLUTE: 0.08 E9/L (ref 0–0.2)
BASOPHILS RELATIVE PERCENT: 0.6 % (ref 0–2)
EOSINOPHILS ABSOLUTE: 0.14 E9/L (ref 0.05–0.5)
EOSINOPHILS RELATIVE PERCENT: 1.1 % (ref 0–6)
HCT VFR BLD CALC: 36.1 % (ref 37–54)
HEMOGLOBIN: 12 G/DL (ref 12.5–16.5)
IMMATURE GRANULOCYTES #: 0.13 E9/L
IMMATURE GRANULOCYTES %: 1 % (ref 0–5)
LYMPHOCYTES ABSOLUTE: 2.04 E9/L (ref 1.5–4)
LYMPHOCYTES RELATIVE PERCENT: 16.3 % (ref 20–42)
MCH RBC QN AUTO: 27.9 PG (ref 26–35)
MCHC RBC AUTO-ENTMCNC: 33.2 % (ref 32–34.5)
MCV RBC AUTO: 84 FL (ref 80–99.9)
MONOCYTES ABSOLUTE: 1.16 E9/L (ref 0.1–0.95)
MONOCYTES RELATIVE PERCENT: 9.3 % (ref 2–12)
NEUTROPHILS ABSOLUTE: 8.93 E9/L (ref 1.8–7.3)
NEUTROPHILS RELATIVE PERCENT: 71.7 % (ref 43–80)
PDW BLD-RTO: 14.4 FL (ref 11.5–15)
PLATELET # BLD: 382 E9/L (ref 130–450)
PMV BLD AUTO: 9.7 FL (ref 7–12)
RBC # BLD: 4.3 E12/L (ref 3.8–5.8)
WBC # BLD: 12.5 E9/L (ref 4.5–11.5)

## 2019-03-23 PROCEDURE — 6360000002 HC RX W HCPCS: Performed by: INTERNAL MEDICINE

## 2019-03-23 PROCEDURE — 85025 COMPLETE CBC W/AUTO DIFF WBC: CPT

## 2019-03-23 PROCEDURE — 6370000000 HC RX 637 (ALT 250 FOR IP): Performed by: STUDENT IN AN ORGANIZED HEALTH CARE EDUCATION/TRAINING PROGRAM

## 2019-03-23 PROCEDURE — 6370000000 HC RX 637 (ALT 250 FOR IP): Performed by: INTERNAL MEDICINE

## 2019-03-23 PROCEDURE — 6370000000 HC RX 637 (ALT 250 FOR IP): Performed by: SPECIALIST

## 2019-03-23 PROCEDURE — 36415 COLL VENOUS BLD VENIPUNCTURE: CPT

## 2019-03-23 PROCEDURE — 2580000003 HC RX 258: Performed by: INTERNAL MEDICINE

## 2019-03-23 PROCEDURE — 6370000000 HC RX 637 (ALT 250 FOR IP): Performed by: CLINICAL NURSE SPECIALIST

## 2019-03-23 RX ORDER — FERROUS SULFATE 325(65) MG
325 TABLET ORAL
Status: DISCONTINUED | OUTPATIENT
Start: 2019-03-23 | End: 2019-03-23 | Stop reason: HOSPADM

## 2019-03-23 RX ORDER — FERROUS SULFATE 325(65) MG
325 TABLET ORAL
Qty: 30 TABLET | Refills: 3 | Status: ON HOLD
Start: 2019-03-23 | End: 2020-06-01 | Stop reason: HOSPADM

## 2019-03-23 RX ORDER — ATENOLOL 25 MG/1
37.5 TABLET ORAL DAILY
Qty: 30 TABLET | Refills: 3 | Status: ON HOLD | DISCHARGE
Start: 2019-03-24 | End: 2020-06-01 | Stop reason: HOSPADM

## 2019-03-23 RX ORDER — PSEUDOEPHEDRINE HCL 30 MG
100 TABLET ORAL DAILY
COMMUNITY
Start: 2019-03-24 | End: 2020-05-14 | Stop reason: DRUGHIGH

## 2019-03-23 RX ORDER — AMLODIPINE BESYLATE 10 MG/1
10 TABLET ORAL DAILY
Qty: 30 TABLET | Refills: 3 | Status: ON HOLD | DISCHARGE
Start: 2019-03-24 | End: 2020-06-01 | Stop reason: HOSPADM

## 2019-03-23 RX ORDER — LACTOBACILLUS RHAMNOSUS GG 10B CELL
1 CAPSULE ORAL DAILY
DISCHARGE
Start: 2019-03-24 | End: 2020-05-14 | Stop reason: ALTCHOICE

## 2019-03-23 RX ORDER — PANTOPRAZOLE SODIUM 40 MG/1
40 TABLET, DELAYED RELEASE ORAL
Qty: 30 TABLET | Refills: 3 | DISCHARGE
Start: 2019-03-24 | End: 2020-05-14 | Stop reason: ALTCHOICE

## 2019-03-23 RX ADMIN — LEVOFLOXACIN 500 MG: 500 TABLET, FILM COATED ORAL at 08:59

## 2019-03-23 RX ADMIN — PANTOPRAZOLE SODIUM 40 MG: 40 TABLET, DELAYED RELEASE ORAL at 05:55

## 2019-03-23 RX ADMIN — AMLODIPINE BESYLATE 10 MG: 10 TABLET ORAL at 08:58

## 2019-03-23 RX ADMIN — Medication 10 ML: at 09:00

## 2019-03-23 RX ADMIN — ATENOLOL 37.5 MG: 25 TABLET ORAL at 08:58

## 2019-03-23 RX ADMIN — DOCUSATE SODIUM 100 MG: 100 CAPSULE, LIQUID FILLED ORAL at 08:59

## 2019-03-23 RX ADMIN — Medication 1 CAPSULE: at 08:59

## 2019-03-23 RX ADMIN — ENOXAPARIN SODIUM 40 MG: 40 INJECTION SUBCUTANEOUS at 09:00

## 2019-03-23 ASSESSMENT — PAIN SCALES - GENERAL: PAINLEVEL_OUTOF10: 0

## 2019-03-24 LAB
BLOOD CULTURE, ROUTINE: NORMAL
CULTURE, BLOOD 2: NORMAL

## 2019-04-02 LAB
Lab: NORMAL
REPORT: NORMAL
THIS TEST SENT TO: NORMAL

## 2020-05-14 ENCOUNTER — HOSPITAL ENCOUNTER (INPATIENT)
Age: 80
LOS: 4 days | Discharge: NURSING, REHAB OR LONG-TERM CARE | DRG: 871 | End: 2020-05-18
Attending: EMERGENCY MEDICINE | Admitting: INTERNAL MEDICINE
Payer: MEDICARE

## 2020-05-14 ENCOUNTER — APPOINTMENT (OUTPATIENT)
Dept: CT IMAGING | Age: 80
DRG: 871 | End: 2020-05-14
Payer: MEDICARE

## 2020-05-14 ENCOUNTER — APPOINTMENT (OUTPATIENT)
Dept: GENERAL RADIOLOGY | Age: 80
DRG: 871 | End: 2020-05-14
Payer: MEDICARE

## 2020-05-14 PROBLEM — A41.9 SEPSIS (HCC): Status: ACTIVE | Noted: 2020-05-14

## 2020-05-14 LAB
ALBUMIN SERPL-MCNC: 3 G/DL (ref 3.5–5.2)
ALP BLD-CCNC: 98 U/L (ref 40–129)
ALT SERPL-CCNC: 12 U/L (ref 0–40)
ANION GAP SERPL CALCULATED.3IONS-SCNC: 18 MMOL/L (ref 7–16)
AST SERPL-CCNC: 15 U/L (ref 0–39)
BACTERIA: ABNORMAL /HPF
BASOPHILS ABSOLUTE: 0 E9/L (ref 0–0.2)
BASOPHILS RELATIVE PERCENT: 0 % (ref 0–2)
BILIRUB SERPL-MCNC: 0.3 MG/DL (ref 0–1.2)
BILIRUBIN URINE: NEGATIVE
BLOOD, URINE: ABNORMAL
BUN BLDV-MCNC: 76 MG/DL (ref 8–23)
CALCIUM SERPL-MCNC: 8.4 MG/DL (ref 8.6–10.2)
CHLORIDE BLD-SCNC: 107 MMOL/L (ref 98–107)
CHP ED QC CHECK: YES
CLARITY: ABNORMAL
CO2: 17 MMOL/L (ref 22–29)
COLOR: YELLOW
CREAT SERPL-MCNC: 2.7 MG/DL (ref 0.7–1.2)
EOSINOPHILS ABSOLUTE: 0 E9/L (ref 0.05–0.5)
EOSINOPHILS RELATIVE PERCENT: 0 % (ref 0–6)
EPITHELIAL CELLS, UA: ABNORMAL /HPF
GFR AFRICAN AMERICAN: 28
GFR NON-AFRICAN AMERICAN: 23 ML/MIN/1.73
GLUCOSE BLD-MCNC: 120 MG/DL (ref 74–99)
GLUCOSE BLD-MCNC: 123 MG/DL
GLUCOSE URINE: NEGATIVE MG/DL
HCT VFR BLD CALC: 48.7 % (ref 37–54)
HEMOGLOBIN: 15.4 G/DL (ref 12.5–16.5)
KETONES, URINE: NEGATIVE MG/DL
LACTIC ACID, SEPSIS: 2.8 MMOL/L (ref 0.5–1.9)
LACTIC ACID, SEPSIS: 6.5 MMOL/L (ref 0.5–1.9)
LEUKOCYTE ESTERASE, URINE: ABNORMAL
LIPASE: 62 U/L (ref 13–60)
LYMPHOCYTES ABSOLUTE: 1.23 E9/L (ref 1.5–4)
LYMPHOCYTES RELATIVE PERCENT: 4 % (ref 20–42)
MCH RBC QN AUTO: 28.8 PG (ref 26–35)
MCHC RBC AUTO-ENTMCNC: 31.6 % (ref 32–34.5)
MCV RBC AUTO: 91.2 FL (ref 80–99.9)
METER GLUCOSE: 123 MG/DL (ref 74–99)
MONOCYTES ABSOLUTE: 0.31 E9/L (ref 0.1–0.95)
MONOCYTES RELATIVE PERCENT: 1 % (ref 2–12)
NEUTROPHILS ABSOLUTE: 29.17 E9/L (ref 1.8–7.3)
NEUTROPHILS RELATIVE PERCENT: 95 % (ref 43–80)
NITRITE, URINE: NEGATIVE
OVALOCYTES: ABNORMAL
PDW BLD-RTO: 14.7 FL (ref 11.5–15)
PH UA: 7.5 (ref 5–9)
PLATELET # BLD: 230 E9/L (ref 130–450)
PMV BLD AUTO: 10.8 FL (ref 7–12)
POIKILOCYTES: ABNORMAL
POTASSIUM SERPL-SCNC: 4.8 MMOL/L (ref 3.5–5)
PRO-BNP: 1615 PG/ML (ref 0–450)
PROTEIN UA: >=300 MG/DL
RBC # BLD: 5.34 E12/L (ref 3.8–5.8)
RBC UA: ABNORMAL /HPF (ref 0–2)
REASON FOR REJECTION: NORMAL
REJECTED TEST: NORMAL
SODIUM BLD-SCNC: 142 MMOL/L (ref 132–146)
SPECIFIC GRAVITY UA: 1.02 (ref 1–1.03)
TOTAL PROTEIN: 7.3 G/DL (ref 6.4–8.3)
TROPONIN: <0.01 NG/ML (ref 0–0.03)
UROBILINOGEN, URINE: 0.2 E.U./DL
WBC # BLD: 30.7 E9/L (ref 4.5–11.5)
WBC UA: >20 /HPF (ref 0–5)

## 2020-05-14 PROCEDURE — 82962 GLUCOSE BLOOD TEST: CPT

## 2020-05-14 PROCEDURE — 99285 EMERGENCY DEPT VISIT HI MDM: CPT

## 2020-05-14 PROCEDURE — 2580000003 HC RX 258: Performed by: INTERNAL MEDICINE

## 2020-05-14 PROCEDURE — 2060000000 HC ICU INTERMEDIATE R&B

## 2020-05-14 PROCEDURE — C9113 INJ PANTOPRAZOLE SODIUM, VIA: HCPCS | Performed by: INTERNAL MEDICINE

## 2020-05-14 PROCEDURE — 85025 COMPLETE CBC W/AUTO DIFF WBC: CPT

## 2020-05-14 PROCEDURE — 36415 COLL VENOUS BLD VENIPUNCTURE: CPT

## 2020-05-14 PROCEDURE — 0100U HC RESPIRPTHGN MULT REV TRANS & AMP PRB TECH 21 TRGT: CPT

## 2020-05-14 PROCEDURE — 87040 BLOOD CULTURE FOR BACTERIA: CPT

## 2020-05-14 PROCEDURE — 83880 ASSAY OF NATRIURETIC PEPTIDE: CPT

## 2020-05-14 PROCEDURE — 6370000000 HC RX 637 (ALT 250 FOR IP): Performed by: INTERNAL MEDICINE

## 2020-05-14 PROCEDURE — 6370000000 HC RX 637 (ALT 250 FOR IP): Performed by: STUDENT IN AN ORGANIZED HEALTH CARE EDUCATION/TRAINING PROGRAM

## 2020-05-14 PROCEDURE — 71045 X-RAY EXAM CHEST 1 VIEW: CPT

## 2020-05-14 PROCEDURE — 83690 ASSAY OF LIPASE: CPT

## 2020-05-14 PROCEDURE — 93005 ELECTROCARDIOGRAM TRACING: CPT | Performed by: STUDENT IN AN ORGANIZED HEALTH CARE EDUCATION/TRAINING PROGRAM

## 2020-05-14 PROCEDURE — 96361 HYDRATE IV INFUSION ADD-ON: CPT

## 2020-05-14 PROCEDURE — 96374 THER/PROPH/DIAG INJ IV PUSH: CPT

## 2020-05-14 PROCEDURE — 2580000003 HC RX 258: Performed by: STUDENT IN AN ORGANIZED HEALTH CARE EDUCATION/TRAINING PROGRAM

## 2020-05-14 PROCEDURE — 6360000002 HC RX W HCPCS: Performed by: STUDENT IN AN ORGANIZED HEALTH CARE EDUCATION/TRAINING PROGRAM

## 2020-05-14 PROCEDURE — 70450 CT HEAD/BRAIN W/O DYE: CPT

## 2020-05-14 PROCEDURE — 83605 ASSAY OF LACTIC ACID: CPT

## 2020-05-14 PROCEDURE — 80053 COMPREHEN METABOLIC PANEL: CPT

## 2020-05-14 PROCEDURE — 74176 CT ABD & PELVIS W/O CONTRAST: CPT

## 2020-05-14 PROCEDURE — 6360000002 HC RX W HCPCS: Performed by: INTERNAL MEDICINE

## 2020-05-14 PROCEDURE — 81001 URINALYSIS AUTO W/SCOPE: CPT

## 2020-05-14 PROCEDURE — 84484 ASSAY OF TROPONIN QUANT: CPT

## 2020-05-14 RX ORDER — ATENOLOL 25 MG/1
37.5 TABLET ORAL DAILY
Status: DISCONTINUED | OUTPATIENT
Start: 2020-05-14 | End: 2020-05-18 | Stop reason: HOSPADM

## 2020-05-14 RX ORDER — METRONIDAZOLE 500 MG/1
500 TABLET ORAL ONCE
Status: COMPLETED | OUTPATIENT
Start: 2020-05-14 | End: 2020-05-14

## 2020-05-14 RX ORDER — 0.9 % SODIUM CHLORIDE 0.9 %
1000 INTRAVENOUS SOLUTION INTRAVENOUS ONCE
Status: COMPLETED | OUTPATIENT
Start: 2020-05-14 | End: 2020-05-14

## 2020-05-14 RX ORDER — PANTOPRAZOLE SODIUM 40 MG/10ML
40 INJECTION, POWDER, LYOPHILIZED, FOR SOLUTION INTRAVENOUS DAILY
Status: DISCONTINUED | OUTPATIENT
Start: 2020-05-14 | End: 2020-05-18 | Stop reason: HOSPADM

## 2020-05-14 RX ORDER — METRONIDAZOLE 500 MG/1
500 TABLET ORAL EVERY 8 HOURS SCHEDULED
Status: DISCONTINUED | OUTPATIENT
Start: 2020-05-14 | End: 2020-05-14

## 2020-05-14 RX ORDER — MELOXICAM 15 MG/1
15 TABLET ORAL DAILY
Status: ON HOLD | COMMUNITY
End: 2020-05-18 | Stop reason: HOSPADM

## 2020-05-14 RX ORDER — SODIUM CHLORIDE 9 MG/ML
1000 INJECTION, SOLUTION INTRAVENOUS CONTINUOUS
Status: DISCONTINUED | OUTPATIENT
Start: 2020-05-14 | End: 2020-05-16 | Stop reason: SDUPTHER

## 2020-05-14 RX ORDER — OMEPRAZOLE 20 MG/1
20 CAPSULE, DELAYED RELEASE ORAL DAILY
COMMUNITY

## 2020-05-14 RX ORDER — METRONIDAZOLE 500 MG/1
500 TABLET ORAL EVERY 12 HOURS SCHEDULED
Status: DISCONTINUED | OUTPATIENT
Start: 2020-05-14 | End: 2020-05-18 | Stop reason: HOSPADM

## 2020-05-14 RX ORDER — MIRTAZAPINE 15 MG/1
15 TABLET, FILM COATED ORAL NIGHTLY
COMMUNITY

## 2020-05-14 RX ORDER — ONDANSETRON 2 MG/ML
4 INJECTION INTRAMUSCULAR; INTRAVENOUS EVERY 6 HOURS PRN
Status: DISCONTINUED | OUTPATIENT
Start: 2020-05-14 | End: 2020-05-18 | Stop reason: HOSPADM

## 2020-05-14 RX ORDER — IPRATROPIUM BROMIDE AND ALBUTEROL SULFATE 2.5; .5 MG/3ML; MG/3ML
1 SOLUTION RESPIRATORY (INHALATION) EVERY 4 HOURS PRN
Status: DISCONTINUED | OUTPATIENT
Start: 2020-05-14 | End: 2020-05-18 | Stop reason: HOSPADM

## 2020-05-14 RX ORDER — HEPARIN SODIUM 5000 [USP'U]/ML
5000 INJECTION, SOLUTION INTRAVENOUS; SUBCUTANEOUS 2 TIMES DAILY
Status: DISCONTINUED | OUTPATIENT
Start: 2020-05-14 | End: 2020-05-18 | Stop reason: HOSPADM

## 2020-05-14 RX ORDER — SODIUM CHLORIDE, SODIUM LACTATE, POTASSIUM CHLORIDE, CALCIUM CHLORIDE 600; 310; 30; 20 MG/100ML; MG/100ML; MG/100ML; MG/100ML
INJECTION, SOLUTION INTRAVENOUS CONTINUOUS
Status: DISCONTINUED | OUTPATIENT
Start: 2020-05-14 | End: 2020-05-18 | Stop reason: HOSPADM

## 2020-05-14 RX ORDER — MIRTAZAPINE 15 MG/1
7.5 TABLET, FILM COATED ORAL NIGHTLY PRN
Status: DISCONTINUED | OUTPATIENT
Start: 2020-05-14 | End: 2020-05-18 | Stop reason: HOSPADM

## 2020-05-14 RX ORDER — ACETAMINOPHEN 500 MG
500 TABLET ORAL EVERY 6 HOURS PRN
Status: DISCONTINUED | OUTPATIENT
Start: 2020-05-14 | End: 2020-05-18 | Stop reason: HOSPADM

## 2020-05-14 RX ADMIN — WATER 1 G: 1 INJECTION INTRAMUSCULAR; INTRAVENOUS; SUBCUTANEOUS at 15:16

## 2020-05-14 RX ADMIN — METRONIDAZOLE 500 MG: 500 TABLET ORAL at 21:35

## 2020-05-14 RX ADMIN — PANTOPRAZOLE SODIUM 40 MG: 40 INJECTION, POWDER, FOR SOLUTION INTRAVENOUS at 21:35

## 2020-05-14 RX ADMIN — SODIUM CHLORIDE 1000 ML: 9 INJECTION, SOLUTION INTRAVENOUS at 17:34

## 2020-05-14 RX ADMIN — METRONIDAZOLE 500 MG: 500 TABLET ORAL at 15:16

## 2020-05-14 RX ADMIN — SODIUM CHLORIDE, POTASSIUM CHLORIDE, SODIUM LACTATE AND CALCIUM CHLORIDE: 600; 310; 30; 20 INJECTION, SOLUTION INTRAVENOUS at 19:47

## 2020-05-14 RX ADMIN — SODIUM CHLORIDE 1000 ML: 9 INJECTION, SOLUTION INTRAVENOUS at 14:52

## 2020-05-14 RX ADMIN — ATENOLOL 37.5 MG: 25 TABLET ORAL at 21:34

## 2020-05-14 RX ADMIN — SODIUM CHLORIDE 1000 ML: 9 INJECTION, SOLUTION INTRAVENOUS at 13:50

## 2020-05-14 RX ADMIN — HEPARIN SODIUM 5000 UNITS: 5000 INJECTION, SOLUTION INTRAVENOUS; SUBCUTANEOUS at 21:35

## 2020-05-14 ASSESSMENT — PAIN SCALES - GENERAL
PAINLEVEL_OUTOF10: 0

## 2020-05-14 NOTE — H&P
Department of Internal Medicine        CHIEF COMPLAINT: Nausea/vomiting, weakness with altered mental status    Reason for Admission: Sepsis    HISTORY OF PRESENT ILLNESS:      The patient is a [de-identified] y.o. male who presents with nausea/vomiting, weakness with altered mental status. The patient lives in the basement of the family home in the family has noticed for the past couple days he is more fatigued and weaker. He has had several episodes of nausea vomiting as well as diarrhea at home. In the ED the patient was tachycardic heart rate about 120 but the patient denied any pain initially. He denied any shortness of breath. Patient does have history of stroke with residual left-sided weakness. He has a history of BUN/creatinine 32/1.1 March 2019 with a hemoglobin of 12. Currently BUN/creatinine 76/2.7 with a lactic acid of 6.5 and a WBC was 30.7 with a hemoglobin 15.4. Analysis had greater than 20 WBCs with moderate leukocyte esterase. Temperature is 97.7 with blood pressure initially 103/82. CT the abdomen showed a fusiform infrarenal abdominal aortic aneurysm 43 x 41 mm. Chest x-ray is unremarkable and a CT the head was negative for intracranial abnormality possible paranasal sinus inflammation. Currently the patient is much more alert and oriented to person and place. Patient currently denies any chest pain, abdominal pain, nausea or vomiting. Patient moves his left arm and leg fairly good. Patient was given IV fluid boluses in the ED and IV antibiotics and admitted to a telemetry floor for sepsis.     Past Medical History:    Past Medical History:   Diagnosis Date    COPD (chronic obstructive pulmonary disease) ([facility]     Enlarged prostate      Past Surgical History:    Past Surgical History:   Procedure Laterality Date    UPPER GASTROINTESTINAL ENDOSCOPY N/A 3/21/2019    [address] [address] [facility]if[address] Rob [facility] [address]  3/21/2019    EGD DILATION BALLOON performed by Heladio Alcala MD at [place] [address]       Medications Prior to Admission:    @  Prior to Admission medications    Medication Sig Start Date End Date Taking? Authorizing Provider   meloxicam (MOBIC) 15 MG tablet Take 15 mg by mouth daily   Yes Historical Provider, MD   mirtazapine (REMERON) 15 MG tablet Take 15 mg by mouth nightly   Yes Historical Provider, MD   omeprazole (PRILOSEC) 20 MG delayed release capsule Take 20 mg by mouth daily   Yes Historical Provider, MD   atenolol (TENORMIN) 25 MG tablet Take 1.5 tablets by mouth daily 3/24/19  Yes Bette Expose, DO   amLODIPine (NORVASC) 10 MG tablet Take 1 tablet by mouth daily 3/24/19  Yes Bette Expose, DO   ferrous sulfate 325 (65 Fe) MG tablet Take 1 tablet by mouth daily (with breakfast) 3/23/19  Yes Bette Expose, DO       Allergies:  Patient has no known allergies.     Social History:   Social History     Socioeconomic History    Marital status:      Spouse name: Not on file    Number of children: Not on file    Years of education: Not on file    Highest education level: Not on file   Occupational History    Not on file   Social Needs    Financial resource strain: Not on file    Food insecurity     Worry: Not on file     Inability: Not on file   [employer] needs     Medical: Not on file     Non-medical: Not on file   Tobacco Use    Smoking status: Current Every Day Smoker     Packs/day: 2.00     Types: Cigarettes    Smokeless tobacco: Never Used    Tobacco comment: chain smoker per patient's son   Substance and Sexual Activity    Alcohol use: Never     Frequency: Never    Drug use: Never    Sexual activity: Not on file   Lifestyle    Physical activity     Days per week: Not on file     Minutes per session: Not on file    Stress: Not on file   Relationships    Social connections     Talks on phone: Not on file     Gets together: Not on file     Attends Cheondo[employer] service: Not on file     Active member of club or organization: Not on file     Attends meetings of clubs or organizations: Not on file     Relationship status: Not on file    Intimate partner violence     Fear of current or ex partner: Not on file     Emotionally abused: Not on file     Physically abused: Not on file     Forced sexual activity: Not on file   Other Topics Concern    Not on file   Social History Narrative    Not on file       Family History:   History reviewed. No pertinent family history. REVIEW OF SYSTEMS:    Gen: + Generalized weakness and fatigue. Patient denies any lightheadedness or dizziness. No LOC or syncope. No fevers or chills. HEENT: No earache, sore throat or nasal congestion. Resp: Denies cough, hemoptysis or sputum production. Cardiac: Denies chest pain, SOB, diaphoresis or palpitations. GI: + nausea, vomiting, diarrhea, no constipation. No melena or hematochezia. : No urinary complaints, dysuria, hematuria or frequency. MSK: No extremity weakness, paralysis or paresthesias. PHYSICAL EXAM:    Vitals:  BP (!) 125/94   Pulse 117   Temp 97.7 °F (36.5 °C)   Resp 17   Ht 5' 4\" (1.626 m)   Wt 108 lb (49 kg)   SpO2 96%   BMI 18.54 kg/m²     General:  This is a [de-identified] y.o. yo male who is alert and oriented X 2 in no apparent distress but appears little anxious  HEENT:  Head is normocephalic and atraumatic, PERRLA, EOMI, mucus membranes moist with no pharyngeal erythema or exudate. Neck:  Supple with no carotid bruits, JVD or thyromegaly. No cervical adenopathy  CV:  Regular rate and rhythm, no murmurs  Lungs: Coarse breath sounds to auscultation bilaterally with no wheezes, rales or rhonchi  Abdomen:  Soft, nontender, nondistended, bowel sounds present  Extremities:  No edema, peripheral pulses intact bilaterally  Neuro:  Cranial nerves II-XII grossly intact; motor and sensory function intact with no focal deficits  Skin: Mild rash in groin folds with mild skin breakdown to both gluteal areas.   Patient has a ulcer on his penis    DATA:  CBC with Differential:    Lab Results   Component Value Date    WBC 30.7 05/14/2020    RBC 5.34 05/14/2020    HGB 15.4 05/14/2020    HCT 48.7 05/14/2020     05/14/2020    MCV 91.2 05/14/2020    MCH 28.8 05/14/2020    MCHC 31.6 05/14/2020    RDW 14.7 05/14/2020    NRBC 0.9 03/16/2019    LYMPHOPCT 4.0 05/14/2020    MONOPCT 1.0 05/14/2020    MYELOPCT 1.7 03/18/2019    BASOPCT 0.0 05/14/2020    MONOSABS 0.31 05/14/2020    LYMPHSABS 1.23 05/14/2020    EOSABS 0.00 05/14/2020    BASOSABS 0.00 05/14/2020     CMP:    Lab Results   Component Value Date     05/14/2020    K 4.8 05/14/2020     05/14/2020    CO2 17 05/14/2020    BUN 76 05/14/2020    CREATININE 2.7 05/14/2020    GFRAA 28 05/14/2020    LABGLOM 23 05/14/2020    GLUCOSE 120 05/14/2020    PROT 7.3 05/14/2020    LABALBU 3.0 05/14/2020    CALCIUM 8.4 05/14/2020    BILITOT 0.3 05/14/2020    ALKPHOS 98 05/14/2020    AST 15 05/14/2020    ALT 12 05/14/2020     Magnesium:  No results found for: MG  Phosphorus:  No results found for: PHOS  PT/INR:    Lab Results   Component Value Date    PROTIME 13.8 03/20/2019    INR 1.2 03/20/2019     Troponin:    Lab Results   Component Value Date    TROPONINI <0.01 05/14/2020     U/A:    Lab Results   Component Value Date    COLORU Yellow 05/14/2020    PROTEINU >=300 05/14/2020    PHUR 7.5 05/14/2020    WBCUA >20 05/14/2020    RBCUA 1-3 05/14/2020    BACTERIA FEW 05/14/2020    CLARITYU CLOUDY 05/14/2020    SPECGRAV 1.020 05/14/2020    LEUKOCYTESUR MODERATE 05/14/2020    UROBILINOGEN 0.2 05/14/2020    BILIRUBINUR Negative 05/14/2020    BLOODU LARGE 05/14/2020    GLUCOSEU Negative 05/14/2020     ABG:  No results found for: PH, PCO2, PO2, HCO3, BE, THGB, TCO2, O2SAT  HgBA1c:  No results found for: LABA1C  FLP:    Lab Results   Component Value Date    TRIG 113 03/14/2019    HDL 20 03/14/2019    LDLCALC 58 03/14/2019    LABVLDL 23 03/14/2019     TSH:    Lab Results   Component Value Date TSH 0.748 03/14/2019     IRON:    Lab Results   Component Value Date    IRON 32 03/20/2019     LIPASE:    Lab Results   Component Value Date    LIPASE 62 05/14/2020       ASSESSMENT AND PLAN:      Patient Active Problem List    Diagnosis Date Noted    Sepsis ([facility] [address] 05/14/2020    UTI 03/14/2019    Severe protein-calorie malnutrition ([facility] 03/14/2019    Weakness 03/13/2019    COPD (chronic obstructive pulmonary disease) (HCC)     Enlarged prostate  History of CVA with residual left-sided weakness      Plan:  Admit to telemetry floor  Home Meds reviewed  Lactated Ringer's 100 cc an hour  IV normal saline bolus in the ED  Pending blood and urine culture  Routine labs in a.m.   Consult PT/OT  Heparin 5000 units subcu every 12  Rocephin 1 g IV piggyback daily  Flagyl 500 mg 2 times daily    Kaylyn Rosales D.O.  5/14/2020  6:39 PM

## 2020-05-14 NOTE — ED NOTES
Bed: 03  Expected date:   Expected time:   Means of arrival:   Comments:  jamie Silva RN  05/14/20 9874

## 2020-05-14 NOTE — ED PROVIDER NOTES
[address]      Pt Name: Erendira Rios  MRN: [id number]  [place] 1940  Date of evaluation: 5/14/2020      CHIEF COMPLAINT       Chief Complaint   Patient presents with    Nausea     n/v/d for 2 days. pt is weak, lethargic. denies any pain or chest pain. HPI  Erendira Rios is a [de-identified] y.o. male with history of COPD, hypertension, previous CVA with left-sided weakness, who presents to the emergency department with weakness, nausea, vomiting, concern for altered mental status. Per EMS the patient lives in the basement of his family's home. They have noticed last couple of days he seemed more fatigued and generally weak. He has had several episodes of nausea and vomiting as well as diarrhea at home. Is not been eating or drinking as much. They grew concerned today when he seemed more fatigued and tired than normal.  They are concerned that this was a new change and were concerned he had altered mental status. Therefore called EMS to have the patient evaluated. EMS report that they were unable to obtain a blood pressure, they state he felt \"cold\", they did not take his temperature. Blood glucose prior to arrival was 211. He was tachycardic for EMS with a heart rate of 120 bpm.  The patient is awake and talking. To me he denies any pain. He denies any shortness of breath. EMS had placed him on nasal cannula oxygen though he does not wear oxygen at home. Patient denies any abdominal pain, chest pain, shortness of breath. History is somewhat limited secondary to the patient's acute condition as well as previous stroke. Except as noted above the remainder of the review of systems was reviewed and negative. Review of Systems   Unable to perform ROS: Mental status change    denies any pain symptoms to me. Physical Exam  Vitals signs and nursing note reviewed.    Constitutional:       Appearance: He is infection. Patient sepsis, likely urosepsis. Started on Rocephin and Flagyl. CT of the abdomen did not show any acute abdominal process. Chest x-ray showed no evidence of pneumonia. Fluid resuscitation 30 cc/kg in the emergency department. Lactic acid improved to 2.8. Patient remained hemodynamically stable though mildly tachycardic throughout his time in the emergency department. He was awake and alert at baseline. He has history of CVA with contractures on the left side, CT of the head did not show any acute intracranial abnormality. ED Course as of May 14 1753   Thu May 14, 2020   1352 Initial blood pressure shows 117/70. De Santiago catheter with temperature sensing placed. Warm blankets applied. Patient awake and alert. [LM]   1401 Bedside ultrasound shows a 4 cm abdominal aorta without obvious intimal flap or other side effects of dissection. No urinary retention after De Santiago catheter placed. Previous CT of the abdomen last year showed 4.9 cm infrarenal abdominal aortic aneurysm. [LM]   1440 Patient remains mildly tachycardic after 1 L normal saline IV. Patient afebrile. Awake and alert. Repeat blood pressures systolic of 606. Continue to monitor patient closely. [LM]   1503 Signing out patient to [address]. Patient's case discussed. Treatment plan as well as current test results reviewed together.      [MS]   1517 Lactic Acid, Sepsis(!!): 6.5 [LM]   1603 After 30 cc/kg fluid resuscitation lactic acid improved from 6.5-2.8. Patient remains hemodynamically stable in the emergency department. Pending metabolic panel and CT scan.    [LM]   2014 Patient continues to be hemodynamically stable in the emergency department. Discussed the case with Dr. Quincy Alberto after the CT scan results were back. Dr. Quincy Alberto accepts the patient for further evaluation of his sepsis, VANESSA, dehydration. Patient will be admitted to intermediate floor.     [LM]      ED Course User Index  [LM] Jolly Yeager,   [MS] Luis Delgadillo DO       EKG: This EKG is signed and interpreted by me. Rate: 123bpm  Rhythm: Sinus tachycardia  Interpretation: Sinus tachycardia. Normal axis. Normal PA interval of 124 ms. Normal QTC of 4 and 38 ms. Nonspecific T wave changes. No ST segment elevation or depression. Comparison: When compared to March 13, 2019 tachycardia is new, no other significant changes. --------------------------------------------- PAST HISTORY ---------------------------------------------  Past Medical History:  has a past medical history of Cerebral artery occlusion with cerebral infarction ([facility] COPD (chronic obstructive pulmonary disease) ([facility] Enlarged prostate, and Hyperlipidemia. Past Surgical History:  has a past surgical history that includes Upper gastrointestinal endoscopy (N/A, 3/21/2019) and Upper gastrointestinal endoscopy (3/21/2019). Social History:  reports that he has been smoking cigarettes. He has been smoking about 2.00 packs per day. He has never used smokeless tobacco. He reports that he does not drink alcohol or use drugs. Family History: family history is not on file. The patients home medications have been reviewed. Allergies: Patient has no known allergies.     -------------------------------------------------- RESULTS -------------------------------------------------    LABS:  Results for orders placed or performed during the hospital encounter of 05/14/20   CBC Auto Differential   Result Value Ref Range    WBC 30.7 (H) 4.5 - 11.5 E9/L    RBC 5.34 3.80 - 5.80 E12/L    Hemoglobin 15.4 12.5 - 16.5 g/dL    Hematocrit 48.7 37.0 - 54.0 %    MCV 91.2 80.0 - 99.9 fL    MCH 28.8 26.0 - 35.0 pg    MCHC 31.6 (L) 32.0 - 34.5 %    RDW 14.7 11.5 - 15.0 fL    Platelets 286 522 - 109 E9/L    MPV 10.8 7.0 - 12.0 fL    Neutrophils % 95.0 (H) 43.0 - 80.0 %    Lymphocytes % 4.0 (L) 20.0 - 42.0 %    Monocytes % 1.0 (L) 2.0 - 12.0 %    Eosinophils % 0.0 0.0 - 6.0 % Bilirubin 0.3 0.0 - 1.2 mg/dL    Alkaline Phosphatase 98 40 - 129 U/L    ALT 12 0 - 40 U/L    AST 15 0 - 39 U/L   Troponin   Result Value Ref Range    Troponin <0.01 0.00 - 0.03 ng/mL   POCT Glucose   Result Value Ref Range    Glucose 123 mg/dL    QC OK? yes    POCT Glucose   Result Value Ref Range    Meter Glucose 123 (H) 74 - 99 mg/dL   EKG 12 Lead   Result Value Ref Range    Ventricular Rate 123 BPM    Atrial Rate 123 BPM    P-R Interval 124 ms    QRS Duration 64 ms    Q-T Interval 306 ms    QTc Calculation (Bazett) 438 ms    P Axis 58 degrees    R Axis 49 degrees    T Axis -136 degrees       RADIOLOGY:  CT Head WO Contrast   Final Result   1. No acute intracranial abnormality. 2.  Partial opacification of the right maxillary sinus may be due to   paranasal sinus inflammation. CT ABDOMEN PELVIS WO CONTRAST Additional Contrast? None   Final Result   1. No acute abnormality in the abdomen or pelvis on the noncontrast CT. No   cause patient's acute abdominal pain or diarrhea localized. 2. Stable fusiform infrarenal abdominal aortic aneurysm measuring 43 x 41 mm,   with measurements obtained perpendicular to blood flow. 3. Stable findings of pulmonary fibrosis/UIP in the lung bases. RECOMMENDATIONS:   4.3 cm abdominal aortic aneurysm. Recommend follow-up every 12 months and   vascular consultation. Reference: J Am Noah Radiol 7651;85:931-633. XR CHEST PORTABLE   Final Result   No acute abnormality.               ------------------------- NURSING NOTES AND VITALS REVIEWED ---------------------------  Date / Time Roomed:  5/14/2020  1:29 PM  ED Bed Assignment:  [id number]    The nursing notes within the ED encounter and vital signs as below have been reviewed.      Patient Vitals for the past 24 hrs:   BP Temp Temp src Pulse Resp SpO2 Height Weight   05/14/20 1900 (!) 124/58 97.1 °F (36.2 °C) Oral 113 19 98 % 5' 4\" (1.626 m) 108 lb (49 kg)   05/14/20 1735 (!) 125/94 -- -- 117 17 96 lethargic. denies any pain or chest pain. )       My findings/plan: There were no encounter diagnoses.   New Prescriptions    No medications on file     Cecilia El, [facility],   Resident  05/14/20 1480

## 2020-05-15 LAB
ADENOVIRUS BY PCR: NOT DETECTED
ALBUMIN SERPL-MCNC: 3.2 G/DL (ref 3.5–5.2)
ALP BLD-CCNC: 96 U/L (ref 40–129)
ALT SERPL-CCNC: 12 U/L (ref 0–40)
ANION GAP SERPL CALCULATED.3IONS-SCNC: 14 MMOL/L (ref 7–16)
AST SERPL-CCNC: 17 U/L (ref 0–39)
BASOPHILS ABSOLUTE: 0.11 E9/L (ref 0–0.2)
BASOPHILS RELATIVE PERCENT: 0.6 % (ref 0–2)
BILIRUB SERPL-MCNC: 0.3 MG/DL (ref 0–1.2)
BORDETELLA PARAPERTUSSIS BY PCR: NOT DETECTED
BORDETELLA PERTUSSIS BY PCR: NOT DETECTED
BUN BLDV-MCNC: 70 MG/DL (ref 8–23)
CALCIUM SERPL-MCNC: 8.6 MG/DL (ref 8.6–10.2)
CHLAMYDOPHILIA PNEUMONIAE BY PCR: NOT DETECTED
CHLORIDE BLD-SCNC: 109 MMOL/L (ref 98–107)
CHOLESTEROL, TOTAL: 118 MG/DL (ref 0–199)
CO2: 22 MMOL/L (ref 22–29)
CORONAVIRUS 229E BY PCR: NOT DETECTED
CORONAVIRUS HKU1 BY PCR: NOT DETECTED
CORONAVIRUS NL63 BY PCR: NOT DETECTED
CORONAVIRUS OC43 BY PCR: NOT DETECTED
CREAT SERPL-MCNC: 2.5 MG/DL (ref 0.7–1.2)
EKG ATRIAL RATE: 123 BPM
EKG P AXIS: 58 DEGREES
EKG P-R INTERVAL: 124 MS
EKG Q-T INTERVAL: 306 MS
EKG QRS DURATION: 64 MS
EKG QTC CALCULATION (BAZETT): 438 MS
EKG R AXIS: 49 DEGREES
EKG T AXIS: -136 DEGREES
EKG VENTRICULAR RATE: 123 BPM
EOSINOPHILS ABSOLUTE: 0.42 E9/L (ref 0.05–0.5)
EOSINOPHILS RELATIVE PERCENT: 2.4 % (ref 0–6)
GFR AFRICAN AMERICAN: 30
GFR NON-AFRICAN AMERICAN: 25 ML/MIN/1.73
GLUCOSE BLD-MCNC: 94 MG/DL (ref 74–99)
HCT VFR BLD CALC: 35.5 % (ref 37–54)
HDLC SERPL-MCNC: 40 MG/DL
HEMOGLOBIN: 11.4 G/DL (ref 12.5–16.5)
HUMAN METAPNEUMOVIRUS BY PCR: NOT DETECTED
HUMAN RHINOVIRUS/ENTEROVIRUS BY PCR: NOT DETECTED
IMMATURE GRANULOCYTES #: 0.09 E9/L
IMMATURE GRANULOCYTES %: 0.5 % (ref 0–5)
INFLUENZA A BY PCR: NOT DETECTED
INFLUENZA B BY PCR: NOT DETECTED
LACTIC ACID: 1.1 MMOL/L (ref 0.5–2.2)
LDL CHOLESTEROL CALCULATED: 63 MG/DL (ref 0–99)
LYMPHOCYTES ABSOLUTE: 1.96 E9/L (ref 1.5–4)
LYMPHOCYTES RELATIVE PERCENT: 11.3 % (ref 20–42)
MCH RBC QN AUTO: 28.9 PG (ref 26–35)
MCHC RBC AUTO-ENTMCNC: 32.1 % (ref 32–34.5)
MCV RBC AUTO: 90.1 FL (ref 80–99.9)
MONOCYTES ABSOLUTE: 1.36 E9/L (ref 0.1–0.95)
MONOCYTES RELATIVE PERCENT: 7.9 % (ref 2–12)
MYCOPLASMA PNEUMONIAE BY PCR: NOT DETECTED
NEUTROPHILS ABSOLUTE: 13.34 E9/L (ref 1.8–7.3)
NEUTROPHILS RELATIVE PERCENT: 77.3 % (ref 43–80)
PARAINFLUENZA VIRUS 1 BY PCR: NOT DETECTED
PARAINFLUENZA VIRUS 2 BY PCR: NOT DETECTED
PARAINFLUENZA VIRUS 3 BY PCR: NOT DETECTED
PARAINFLUENZA VIRUS 4 BY PCR: NOT DETECTED
PDW BLD-RTO: 14.6 FL (ref 11.5–15)
PLATELET # BLD: 193 E9/L (ref 130–450)
PMV BLD AUTO: 11 FL (ref 7–12)
POTASSIUM SERPL-SCNC: 4.6 MMOL/L (ref 3.5–5)
PROCALCITONIN: 0.41 NG/ML (ref 0–0.08)
RBC # BLD: 3.94 E12/L (ref 3.8–5.8)
RESPIRATORY SYNCYTIAL VIRUS BY PCR: NOT DETECTED
SODIUM BLD-SCNC: 145 MMOL/L (ref 132–146)
TOTAL PROTEIN: 7 G/DL (ref 6.4–8.3)
TRIGL SERPL-MCNC: 73 MG/DL (ref 0–149)
TSH SERPL DL<=0.05 MIU/L-ACNC: 0.56 UIU/ML (ref 0.27–4.2)
VLDLC SERPL CALC-MCNC: 15 MG/DL
WBC # BLD: 17.3 E9/L (ref 4.5–11.5)

## 2020-05-15 PROCEDURE — 6360000002 HC RX W HCPCS: Performed by: INTERNAL MEDICINE

## 2020-05-15 PROCEDURE — 97530 THERAPEUTIC ACTIVITIES: CPT

## 2020-05-15 PROCEDURE — 36415 COLL VENOUS BLD VENIPUNCTURE: CPT

## 2020-05-15 PROCEDURE — 84443 ASSAY THYROID STIM HORMONE: CPT

## 2020-05-15 PROCEDURE — 84145 PROCALCITONIN (PCT): CPT

## 2020-05-15 PROCEDURE — 85025 COMPLETE CBC W/AUTO DIFF WBC: CPT

## 2020-05-15 PROCEDURE — 93010 ELECTROCARDIOGRAM REPORT: CPT | Performed by: INTERNAL MEDICINE

## 2020-05-15 PROCEDURE — C9113 INJ PANTOPRAZOLE SODIUM, VIA: HCPCS | Performed by: INTERNAL MEDICINE

## 2020-05-15 PROCEDURE — 83605 ASSAY OF LACTIC ACID: CPT

## 2020-05-15 PROCEDURE — 80053 COMPREHEN METABOLIC PANEL: CPT

## 2020-05-15 PROCEDURE — 6370000000 HC RX 637 (ALT 250 FOR IP): Performed by: INTERNAL MEDICINE

## 2020-05-15 PROCEDURE — 97165 OT EVAL LOW COMPLEX 30 MIN: CPT

## 2020-05-15 PROCEDURE — 80061 LIPID PANEL: CPT

## 2020-05-15 PROCEDURE — 97161 PT EVAL LOW COMPLEX 20 MIN: CPT | Performed by: PHYSICAL THERAPIST

## 2020-05-15 PROCEDURE — 2580000003 HC RX 258: Performed by: INTERNAL MEDICINE

## 2020-05-15 PROCEDURE — 2060000000 HC ICU INTERMEDIATE R&B

## 2020-05-15 PROCEDURE — 97530 THERAPEUTIC ACTIVITIES: CPT | Performed by: PHYSICAL THERAPIST

## 2020-05-15 RX ADMIN — ATENOLOL 37.5 MG: 25 TABLET ORAL at 08:32

## 2020-05-15 RX ADMIN — HEPARIN SODIUM 5000 UNITS: 5000 INJECTION, SOLUTION INTRAVENOUS; SUBCUTANEOUS at 20:37

## 2020-05-15 RX ADMIN — HEPARIN SODIUM 5000 UNITS: 5000 INJECTION, SOLUTION INTRAVENOUS; SUBCUTANEOUS at 08:32

## 2020-05-15 RX ADMIN — METRONIDAZOLE 500 MG: 500 TABLET ORAL at 08:32

## 2020-05-15 RX ADMIN — METRONIDAZOLE 500 MG: 500 TABLET ORAL at 20:37

## 2020-05-15 RX ADMIN — WATER 1 G: 1 INJECTION INTRAMUSCULAR; INTRAVENOUS; SUBCUTANEOUS at 15:30

## 2020-05-15 RX ADMIN — PANTOPRAZOLE SODIUM 40 MG: 40 INJECTION, POWDER, FOR SOLUTION INTRAVENOUS at 08:32

## 2020-05-15 RX ADMIN — SODIUM CHLORIDE, POTASSIUM CHLORIDE, SODIUM LACTATE AND CALCIUM CHLORIDE: 600; 310; 30; 20 INJECTION, SOLUTION INTRAVENOUS at 16:12

## 2020-05-15 ASSESSMENT — PAIN SCALES - GENERAL
PAINLEVEL_OUTOF10: 0

## 2020-05-15 NOTE — PLAN OF CARE
Problem: Falls - Risk of:  Goal: Will remain free from falls  Description: Will remain free from falls  5/15/2020 1124 by Frank Purdy RN  Outcome: Met This Shift     Problem: Falls - Risk of:  Goal: Absence of physical injury  Description: Absence of physical injury  5/15/2020 1124 by Frank Purdy RN  Outcome: Met This Shift     Problem: Gas Exchange - Impaired:  Goal: Levels of oxygenation will improve  Description: Levels of oxygenation will improve  5/15/2020 1124 by Frank Purdy RN  Outcome: Met This Shift     Problem: Infection, Septic Shock:  Goal: Will show no infection signs and symptoms  Description: Will show no infection signs and symptoms  5/15/2020 1124 by Frank Purdy RN  Outcome: Met This Shift

## 2020-05-15 NOTE — CARE COORDINATION
Ss note:5/15/2020.2:10 PM  WILL NEED COVID TESTING FOR ADRIAN PLACEMENT 48 HRS prior to discharge. Per liaison Coleen  [phone] @ [facility] accepted & bed IS  available over the weekend, NO PRECERT. Hens completed. Facility liaison & son will need notified when discharge is written.  SCOTT Marie

## 2020-05-15 NOTE — DISCHARGE INSTR - COC
Continuity of Care Form    Patient Name: Erwin Mcfarland   :  1940  MRN:  [id number]    Admit date:  2020  Discharge date:  2020    Code Status Order: Full Code   Advance Directives:   [facility] Documentation     Date/Time Healthcare Directive Type of Healthcare Directive Copy in [facility] [address] Agent's Name Healthcare Agent's Phone Number    20  No, patient does not have an advance directive for healthcare treatment -- -- -- -- --          Admitting Physician:  Jun Rodriges DO  PCP: No primary care provider on file. [facility]13-64  Discharging Unit Phone Number: [phone]    Emergency Contact:   Extended Emergency Contact Information  Primary Emergency Contact: [address] [place] [address] Phone: [phone]  Relation: Child  Preferred language: English   needed? No  Secondary Emergency Contact: [facility] Phone: [phone]  Mobile Phone: [phone]  Relation: Child  Preferred language: English   needed? No    Past Surgical History:  Past Surgical History:   Procedure Laterality Date    UPPER GASTROINTESTINAL ENDOSCOPY N/A 3/21/2019    EGD BIOPSY performed by Puneet Brower MD at [address] [place] [address]  3/21/2019    EGD DILATION BALLOON performed by Puneet Brower MD at [facility] ENDOSCOPY       Immunization History: There is no immunization history on file for this patient.     Active Problems:  Patient Active Problem List   Diagnosis Code    Weakness R53.1    COPD (chronic obstructive pulmonary disease) (HCC) J44.9    Enlarged prostate N40.0    Pneumonia J18.9    Severe protein-calorie malnutrition ([address] E43    Sepsis ([address] A41.9       Isolation/Infection:   Isolation          No Isolation        Patient Infection Status     None to display          Nurse Assessment:  Last Vital Signs: /60   Pulse 76 Temp 98.4 °F (36.9 °C) (Oral)   Resp 18   Ht 5' 4\" (1.626 m)   Wt 108 lb (49 kg)   SpO2 96%   BMI 18.54 kg/m²     Last documented pain score (0-10 scale): Pain Level: 0  Last Weight:   Wt Readings from Last 1 Encounters:   05/14/20 108 lb (49 kg)     Mental Status:  disoriented and alert    IV Access:  - None    Nursing Mobility/ADLs:  Walking   Dependent  Transfer  Dependent  Bathing  Dependent  Dressing  Dependent  Toileting  Dependent  Feeding  Dependent  Med Admin  Dependent  Med Delivery   crushed and prefers mixed with applesauce    Wound Care Documentation and Therapy:  Wound 05/14/20 Buttocks Right (Active)   Dressing Status Clean;Dry; Intact 5/15/2020  7:30 AM   Dressing Changed Changed/New 5/14/2020  8:10 PM   Dressing/Treatment Foam 5/15/2020  7:30 AM   Wound Length (cm) 1 cm 5/14/2020  8:10 PM   Wound Width (cm) 0.5 cm 5/14/2020  8:10 PM   Wound Surface Area (cm^2) 0.5 cm^2 5/14/2020  8:10 PM   Wound Assessment Red;Painful 5/15/2020  7:30 AM   Drainage Amount Small 5/14/2020  8:10 PM   Drainage Description Serosanguinous 5/14/2020  8:10 PM   Odor None 5/14/2020  8:10 PM   Divya-wound Assessment Blanchable erythema 5/14/2020  8:10 PM   Number of days: 0       Wound 05/14/20 Penis  (Active)   Dressing Status Other (Comment) 5/14/2020  8:10 PM   Dressing/Treatment Open to air 5/15/2020  7:30 AM   Wound Length (cm) 1 cm 5/14/2020  8:10 PM   Wound Width (cm) 0.5 cm 5/14/2020  8:10 PM   Wound Surface Area (cm^2) 0.5 cm^2 5/14/2020  8:10 PM   Wound Assessment Red; Other (Comment) 5/15/2020  7:30 AM   Drainage Amount Scant 5/14/2020  8:10 PM   Drainage Description Serosanguinous 5/14/2020  8:10 PM   Odor None 5/14/2020  8:10 PM   Number of days: 0        Elimination:  Continence:   · Bowel: No  · Bladder: No  Urinary Catheter: Removal Date 5/18   Colostomy/Ileostomy/Ileal Conduit: No       Date of Last BM: 5/18    Intake/Output Summary (Last 24 hours) at 5/15/2020 1424  Last data filed at 5/15/2020 1253  Gross per 24 hour   Intake 1179 ml   Output 650 ml   Net 529 ml     I/O last 3 completed shifts: In: 999 [P.O.:50; I.V.:949]  Out: 650 [Urine:650]    Safety Concerns: At Risk for Falls    Impairments/Disabilities:      Vision    Nutrition Therapy:  Current Nutrition Therapy:   - Oral Diet:  General and Dental Soft    Routes of Feeding: Oral  Liquids: Thin Liquids  Daily Fluid Restriction: no  Last Modified Barium Swallow with Video (Video Swallowing Test): not done    Treatments at the Time of Hospital Discharge:   Respiratory Treatments:   Oxygen Therapy:  is not on home oxygen therapy.   Ventilator:    - No ventilator support    Rehab Therapies: {THERAPEUTIC INTERVENTION:[id number]}  Weight Bearing Status/Restrictions: No weight bearing restirctions  Other Medical Equipment (for information only, NOT a DME order):  hospital bed  Other Treatments: bmp and cbc in 3 days    Patient's personal belongings (please select all that are sent with patient):  None    RN SIGNATURE:  Electronically signed by Solange Kendall RN on 5/18/20 at 2:34 PM EDT    CASE MANAGEMENT/SOCIAL WORK SECTION    Inpatient Status Date: ***    Readmission Risk Assessment Score:  Readmission Risk              Risk of Unplanned Readmission:        10           Discharging to Facility/ Agency   · Name:    [facility]  · Address:  [address] [place] [address] [address]   · Phone:  [phone]  · Fax:  [phone]    Dialysis Facility (if applicable)   · Name:  · Address:  · Dialysis Schedule:  · Phone:  · Fax:    / signature: Electronically signed by SCOTT Gavin on 5/15/20 at 2:26 PM EDT    PHYSICIAN SECTION    Prognosis: Good    Condition at Discharge: Stable    Rehab Potential (if transferring to Rehab): Good    Recommended Labs or Other Treatments After Discharge: ***    Physician Certification: I certify the above information and transfer of Donyagustavo Lisa  is necessary for the continuing treatment of the diagnosis listed and that he requires [place] for less 30 days.      Update Admission H&P: {CHP DME Changes in [facility]:[id number]}    PHYSICIAN SIGNATURE:  Electronically signed by Kaylyn Rosales DO on 5/18/20 at 11:49 AM EDT

## 2020-05-15 NOTE — PROGRESS NOTES
Assistance for feeding. Pt does not have his dentures here. Difficulty gumming [place] toast. Coughing noted during eating and drinking. Nursing notified and aware. Transfer training with verbal cues to for hand placement throughout evaluation to improve safety, static standing balance with hand held assist x 2 to improve balance, functional mobility with hand  to improve balance, Pt assisted back to EOB and then ultimately assisted back to bed. Rolling to adjust bed linens/chux pads, maximal assist x 2 to scoot up in bed. OT services provided to include instruction/training on safety and adapted techniques for completion of transfers and ADL's. Evaluation Complexity:  · Low Complexity  · History: Brief history including review of medical records relating to the problem  · Exam: 1-3 performance Deficits  · Assistance/Modification: No assistance or modifications required to perform tasks. No comorbities affecting occupational performance.     Assessment of current deficits   Functional mobility [x]        ADLs [x]        Strength [x]      Cognition []  Functional transfers  [x]       IADLs [x]        Safety Awareness []      Endurance [x]  Fine Motor Coordination []       Balance [x]       Vision/perception []     Sensation []   Gross Motor Coordination []       ROM []         Delirium []                          Motor Control []    Plan of Care: OT 1-3x/week PRN during hospitalization  ADL retraining [x]   Equipment needs [x]   Neuromuscular re-education [] Energy Conservation Techniques [x]  Functional Transfer training [x] Patient and/or Family Education [x]  Functional Mobility training [x]  Environmental Modifications []  Cognitive re-training []   Compensatory techniques for ADLs [x]  Splinting Needs []   Positioning to improve overall function [x]   Therapeutic Activity [x]  Therapeutic Exercise  []  Visual/Perceptual: []    Delirium prevention/treatment  []  Other:  []    Rehab Potential: Good for established goals developed with patient and family. Patient / Family Goal: return home      Patient and/or family were instructed on functional diagnosis, prognosis/goals and OT plan of care. Demonstrated fair understanding. Time In: 8:57am    Time Out: 9:15am                 Treatment Charges: Mins Units   ADL/Home Mgt                    [id number] 2    Therapeutic Activities          [id number]    Therapeutic Exercise          [id number]     Manual Therapy                  [id number]     Neuro Re-ed                       81554     Orthotic manage/training    [id number]     Total Timed Treatment 10 1     Evaluation time includes thorough review of current medical information, gathering information on past medical history/social history and prior level of function, completion of standardized testing/informal observation of tasks, assessment of data, and development of POC/Goals.     Marianela Boo OTR/L [id number]

## 2020-05-15 NOTE — PROGRESS NOTES
Department of Internal Medicine        CHIEF COMPLAINT: Nausea/vomiting, weakness with altered mental status    Reason for Admission: Sepsis    HISTORY OF PRESENT ILLNESS:      The patient is a [de-identified] y.o. male who presents with nausea/vomiting, weakness with altered mental status. The patient lives in the basement of the family home in the family has noticed for the past couple days he is more fatigued and weaker. He has had several episodes of nausea vomiting as well as diarrhea at home. In the ED the patient was tachycardic heart rate about 120 but the patient denied any pain initially. He denied any shortness of breath. Patient does have history of stroke with residual left-sided weakness. He has a history of BUN/creatinine 32/1.1 March 2019 with a hemoglobin of 12. Currently BUN/creatinine 76/2.7 with a lactic acid of 6.5 and a WBC was 30.7 with a hemoglobin 15.4. Analysis had greater than 20 WBCs with moderate leukocyte esterase. Temperature is 97.7 with blood pressure initially 103/82. CT the abdomen showed a fusiform infrarenal abdominal aortic aneurysm 43 x 41 mm. Chest x-ray is unremarkable and a CT the head was negative for intracranial abnormality possible paranasal sinus inflammation. Currently the patient is much more alert and oriented to person and place. Patient currently denies any chest pain, abdominal pain, nausea or vomiting. Patient moves his left arm and leg fairly good. Patient was given IV fluid boluses in the ED and IV antibiotics and admitted to a telemetry floor for sepsis. 5/15/2020  Patient was seen and examined on monitor. Patient is not as talkative today. Patient denies any chest or abdominal pain. Patient though asked nursing to call me to give him his full dose of Remeron at 2 AM this morning. Temperature 98.4 with a heart rate of 76. Blood pressure 126/60. O2 sats 96% on room air. BUN/creatinine still elevated 70/2.5 WBC is 17,000.   Globin is 11.4 this morning. Still pending urine culture. Past Medical History:    Past Medical History:   Diagnosis Date    Cerebral artery occlusion with cerebral infarction ([facility] [address]     COPD (chronic obstructive pulmonary disease) (HCC)     Enlarged prostate     Hyperlipidemia      Past Surgical History:    Past Surgical History:   Procedure Laterality Date    UPPER GASTROINTESTINAL ENDOSCOPY N/A 3/21/2019    EGD BIOPSY performed by Luis Elizondo MD at [facility]  3/21/2019    EGD DILATION BALLOON performed by Luis Elizondo MD at [facility] ENDOSCOPY       Medications Prior to Admission:    @  Prior to Admission medications    Medication Sig Start Date End Date Taking? Authorizing Provider   meloxicam (MOBIC) 15 MG tablet Take 15 mg by mouth daily   Yes Historical Provider, MD   mirtazapine (REMERON) 15 MG tablet Take 15 mg by mouth nightly   Yes Historical Provider, MD   omeprazole (PRILOSEC) 20 MG delayed release capsule Take 20 mg by mouth daily   Yes Historical Provider, MD   atenolol (TENORMIN) 25 MG tablet Take 1.5 tablets by mouth daily 3/24/19  Yes Ferna Bussing, DO   amLODIPine (NORVASC) 10 MG tablet Take 1 tablet by mouth daily 3/24/19  Yes Ferna Bussing, DO   ferrous sulfate 325 (65 Fe) MG tablet Take 1 tablet by mouth daily (with breakfast) 3/23/19  Yes Ferna Bussing, DO       Allergies:  Patient has no known allergies.     Social History:   Social History     Socioeconomic History    Marital status:      Spouse name: Not on file    Number of children: Not on file    Years of education: Not on file    Highest education level: Not on file   Occupational History    Not on file   Social Needs    Financial resource strain: Not on file    Food insecurity     Worry: Not on file     Inability: Not on file    Transportation needs     Medical: Not on file     Non-medical: Not on file   Tobacco Use    Smoking status: Current Every Day Smoker     Packs/day: 2.00 Types: Cigarettes    Smokeless tobacco: Never Used    Tobacco comment: chain smoker per patient's son   Substance and Sexual Activity    Alcohol use: Never     Frequency: Never    Drug use: Never    Sexual activity: Not on file   Lifestyle    Physical activity     Days per week: Not on file     Minutes per session: Not on file    Stress: Not on file   Relationships    Social connections     Talks on phone: Not on file     Gets together: Not on file     Attends Sikhism service: Not on file     Active member of club or organization: Not on file     Attends meetings of clubs or organizations: Not on file     Relationship status: Not on file    Intimate partner violence     Fear of current or ex partner: Not on file     Emotionally abused: Not on file     Physically abused: Not on file     Forced sexual activity: Not on file   Other Topics Concern    Not on file   Social History Narrative    Not on file       Family History:   History reviewed. No pertinent family history. REVIEW OF SYSTEMS:    Gen: + Generalized weakness and fatigue. Patient denies any lightheadedness or dizziness. No LOC or syncope. No fevers or chills. HEENT: No earache, sore throat or nasal congestion. Resp: Denies cough, hemoptysis or sputum production. Cardiac: Denies chest pain, SOB, diaphoresis or palpitations. GI: + nausea, vomiting, diarrhea, no constipation. No melena or hematochezia. : No urinary complaints, dysuria, hematuria or frequency. MSK: No extremity weakness, paralysis or paresthesias.      PHYSICAL EXAM:    Vitals:  /60   Pulse 76   Temp 98.4 °F (36.9 °C) (Oral)   Resp 18   Ht 5' 4\" (1.626 m)   Wt 108 lb (49 kg)   SpO2 96%   BMI 18.54 kg/m²     General:  This is a [de-identified] y.o. yo male who is alert and oriented X 2 in no apparent distress but appears little anxious  HEENT:  Head is normocephalic and atraumatic, PERRLA, EOMI, mucus membranes moist with no pharyngeal erythema or BACTERIA FEW 05/14/2020    CLARITYU CLOUDY 05/14/2020    SPECGRAV 1.020 05/14/2020    LEUKOCYTESUR MODERATE 05/14/2020    UROBILINOGEN 0.2 05/14/2020    BILIRUBINUR Negative 05/14/2020    BLOODU LARGE 05/14/2020    GLUCOSEU Negative 05/14/2020     ABG:  No results found for: PH, PCO2, PO2, HCO3, BE, THGB, TCO2, O2SAT  HgBA1c:  No results found for: LABA1C  FLP:    Lab Results   Component Value Date    TRIG 73 05/15/2020    HDL 40 05/15/2020    LDLCALC 63 05/15/2020    LABVLDL 15 05/15/2020     TSH:    Lab Results   Component Value Date    TSH 0.555 05/15/2020     IRON:    Lab Results   Component Value Date    IRON 32 03/20/2019     LIPASE:    Lab Results   Component Value Date    LIPASE 62 05/14/2020       ASSESSMENT AND PLAN:      Patient Active Problem List    Diagnosis Date Noted    Sepsis ([facility] [address] 05/14/2020    UTI 03/14/2019    Severe protein-calorie malnutrition ([facility] [address] 03/14/2019    Weakness 03/13/2019    COPD (chronic obstructive pulmonary disease) (HCC)     Enlarged prostate  History of CVA with residual left-sided weakness  Infrarenal abdominal aortic aneurysm- 4.3 x 4.1 cm      Plan:  Admit to telemetry floor  Home Meds reviewed  Lactated Ringer's 100 cc an hour  IV normal saline bolus in the ED  Pending blood and urine culture  Routine labs in a.m.   Consult PT/OT  Heparin 5000 units subcu every 12  Rocephin 1 g IV piggyback daily  Flagyl 500 mg 2 times daily    Burak Benitez, D.O.  5/15/2020  11:33 AM

## 2020-05-15 NOTE — CARE COORDINATION
5/15/2020 1241  note:Per Lolly Rolle, pt is not service connected and no travel benefits.  Emily CABAN

## 2020-05-15 NOTE — PLAN OF CARE
Problem: Falls - Risk of:  Goal: Will remain free from falls  Description: Will remain free from falls  5/15/2020 0559 by Dinora Chang RN  Outcome: Met This Shift     Problem: Falls - Risk of:  Goal: Absence of physical injury  Description: Absence of physical injury  5/15/2020 0559 by Dinora Chang RN  Outcome: Met This Shift     Problem: Gas Exchange - Impaired:  Goal: Levels of oxygenation will improve  Description: Levels of oxygenation will improve  Outcome: Met This Shift     Problem: Infection, Septic Shock:  Goal: Will show no infection signs and symptoms  Description: Will show no infection signs and symptoms  Outcome: Met This Shift

## 2020-05-15 NOTE — PLAN OF CARE
Problem: Inadequate oral food/beverage intake (NI-2.1)  Goal: Food and/or Nutrient Delivery  Description: Start ONS BID/ONS for wound healing  Outcome: Met This Shift

## 2020-05-15 NOTE — PROGRESS NOTES
Physical Therapy    Physical Therapy Initial Evaluation    Room #:  2853/2104-81  Patient Name: Dulce Jacome  YOB: 1940  MRN: [id number]    Referring Provider: Divina Herrera DO    Date of Service: 5/15/2020    Evaluating Physical Therapist:  Sinan Morley PT  Lic. # [id number]      Diagnosis:   Sepsis ([facility] [address] [A41.9]   Admitted with lethargy nausea, vomiting, diarrhea      Patient Active Problem List   Diagnosis    Weakness    COPD (chronic obstructive pulmonary disease) ([facility] [address]    Enlarged prostate    Pneumonia    Severe protein-calorie malnutrition ([facility] [address]    Sepsis ([facility] [address]        Tentative placement recommendation: Subacute vs Home Health Physical Therapy if patient meets goals pending progress and patient's family's ability to assist.     Equipment recommendation:  To be determined      Prior Level of Function: unable to determine; pt denies walking at home  Rehab Potential: good  for goals    Past medical history:   Past Medical History:   Diagnosis Date    Cerebral artery occlusion with cerebral infarction ([facility] [address]     COPD (chronic obstructive pulmonary disease) ([facility] [address]     Enlarged prostate     Hyperlipidemia      Past Surgical History:   Procedure Laterality Date    UPPER GASTROINTESTINAL ENDOSCOPY N/A 3/21/2019    EGD BIOPSY performed by Desire Fitzgerald MD at [place] [id number]  3/21/2019    EGD DILATION BALLOON performed by Desire Fitzgerald MD at [facility] ENDOSCOPY       Precautions: falls and alarm ,      SUBJECTIVE:    Social history: Patient lives with son  And according to previous charting stays in a finished basement with 10 steps ;  to follow up with phone call to son    Equipment owned: None,       AM-PAC Basic Mobility        AM-PAC Mobility Inpatient   How much difficulty turning over in bed?: A Little  How much difficulty sitting down on / standing up from a chair with arms?: A Lot  How much difficulty moving from lying on back to sitting on side of bed?: A Lot  How much help from another person moving to and from a bed to a chair?: A Lot  How much help from another person needed to walk in hospital room?: A Lot  How much help from another person for climbing 3-5 steps with a railing?: Total  AM-PAC Inpatient Mobility Raw Score : 12  AM-PAC Inpatient T-Scale Score : 35.33  Mobility Inpatient CMS 0-100% Score: 68.66  Mobility Inpatient CMS G-Code Modifier : CL    Nursing cleared patient for PT evaluation. The admitting diagnosis and active problem list as listed above have been reviewed prior to the initiation of this evaluation. OBJECTIVE:   Initial Evaluation  Date: 5/15/20 Treatment Date:     Short Term/ Long Term   Goals   Was pt agreeable to Eval/treatment? Yes    To be met in 3 days   Pain level   0/10        Bed Mobility    Rolling: Minimal assist of 1    Supine to sit: Moderate assist of 1    Sit to supine: Moderate assist of 1    Scooting:  Moderate assist of  2    Rolling: Supervision     Supine to sit: Supervision     Sit to supine: Supervision     Scooting: Supervision      Transfers Sit to stand: Minimal assist of 2    Sit to stand: Supervision      Ambulation    4 side steps using  hand held assist with Moderate assist of  2   with short, shuffling steps   40 feet using  wheeled walker with Moderate assist of 1    Stair negotiation: ascended and descended   Not assessed       10 steps with min a and rail   ROM Within functional limits      Increase range of motion 10% of affected joints    Strength BUE: 3-/5  RLE:  3-/5  LLE:  3-/5   Increase strength in affected mm groups by 1/3 grade   Balance Sitting EOB:  fair    Dynamic Standing:  poor    Sitting EOB:  fair plus  Dynamic Standing: fair       Patient is Alert & Oriented x person and place and follows one step directions    Sensation:  Patient denies numbness and tingling to extremities    Edema:  no   Endurance: poor           Patient education  Patient educated on role of Physical Therapy, risks of immobility and plan of care      Patient response to education:   Pt verbalized understanding Pt demonstrated skill Pt requires further education in this area   Yes Partial Yes      ASSESSMENT: Patient exhibits decreased strength, balance, coordination impairing functional mobility. Therapist educated and facilitated patient on techniques to increase safety and independence with bed mobility, balance, functional transfers, and functional mobility. Treatment:  Patient practiced and was instructed in the following treatment: Sat edge of bed 10 minutes with Supervision  to increase dynamic sitting balance and activity tolerance. At end of session, patient in bed with alarm call light and phone within reach,   all lines and tubes intact, nursing notified. Patient would benefit from continued skilled Physical Therapy to improve functional independence and quality of life. Patient's/ family goals   home        Patient and or family understand(s) diagnosis, prognosis, and plan of care. PLAN:    Physical Therapy care will be provided in accordance with the objectives noted above. Exercises and functional mobility practice will be used as well as appropriate assistive devices or modalities to obtain goals. Patient and family education will also be administered as needed. Frequency of treatments: Patient will be seen    daily  for therapeutic exercise, functional retraining, endurance activities, balance exercises, family and patient education. Time in  850  Time out  915    Total Treatment Time  15 minutes    Evaluation time includes thorough review of current medical information, gathering information on past medical history/social history and prior level of function, completion of standardized testing/informal observation of tasks, assessment of data, and development of Plan of care and goals.      CPT codes:  Low Complexity PT evaluation (84296)  Therapeutic

## 2020-05-15 NOTE — CARE COORDINATION
Ss note:5/15/2020.11:09 AM WILL NEED COVID TESTING FOR SNF PLACEMENT. Per rounds pt is confused, therapy has worked with pt; rec snf vs hhc. Sw contacted pt son Trip Allen [phone]. Son reports pt lives with him, was attending [facility] 3 times per week prior to pandemic. Pt was able to ambulate stairs prior to the pandemic however has had a decline since unable to attend . Pt has private duty aides from [facility] 5 days per week. Son reports pt spends a lot of time in his room and has not ambulated much, he does have a walker and w/c. Hx of [facility] DR MASON HAQUE [facility]. Son prefers referrals to 1. [facility] 2. [facility], for short term rehab for strength building. Referrals made to both, awaiting acceptance. NO PRECERT.  SCOTT Bob

## 2020-05-15 NOTE — PROGRESS NOTES
Nutrition Assessment    Type and Reason for Visit: Initial, Positive Nutrition Screen    Nutrition Recommendations: Continue current diet, Start ONS    Nutrition Assessment: Pt severely malnourished on admit AEB weight loss, poor p.o. intake, severe subcutaneous fat and muscle mass loss. At further compromise 2/2 presence of wounds, on-going PTA N/V/D. Will start ONS and monitor for changes in nutrition    Malnutrition Assessment:  · Malnutrition Status: Meets the criteria for severe malnutrition  · Context: Acute illness or injury  · Findings of the 6 clinical characteristics of malnutrition (Minimum of 2 out of 6 clinical characteristics is required to make the diagnosis of moderate or severe Protein Calorie Malnutrition based on AND/ASPEN Guidelines):  1. Energy Intake-Less than or equal to 50% of estimated energy requirement, Greater than or equal to 7 days(x2 weeks per family report)    2. Weight Loss-5% loss or greater, in 1 week(3 weeks)  3. Fat Loss-Severe subcutaneous fat loss, Orbital, Triceps  4. Muscle Loss-Severe muscle mass loss, Temples (temporalis muscle), Clavicles (pectoralis and deltoids)  5. Fluid Accumulation-No significant fluid accumulation,    6.   Strength-Not measured    Nutrition Risk Level: High    Nutrient Needs:  · Estimated Daily Total Kcal: 1400-1500kcal/d(MSJ/REE 1112 x 1.2 Stress factor)  · Estimated Daily Protein (g): 80-90gm pro/d(x1.5gm/kg)  · Estimated Daily Total Fluid (ml/day): 1400-1500ml/d(x1ml/kg)    Nutrition Diagnosis:   · Problem: Severe malnutrition, In context of acute illness or injury  · Etiology: related to Insufficient energy/nutrient consumption     Signs and symptoms:  as evidenced by Diet history of poor intake, Intake 0-25%, Presence of wounds, Weight loss, Weight loss greater than or equal to 2% in 1 week, Severe loss of subcutaneous fat, Severe muscle loss, Nausea, Vomiting, Diarrhea    Objective Information:  · Nutrition-Focused Physical Findings: A/Ox1, BS+, N/V/D, No edema, I/O WNL. , Renal labs elevated  · Wound Type: Multiple, Open Wounds(Buttocks excoriated , blisters, Area to penis)  · Current Nutrition Therapies:  · Oral Diet Orders: General   · Oral Diet intake: 1-25%  · Oral Nutrition Supplement (ONS) Orders: None  · Anthropometric Measures:  · Ht: 5' 4\" (162.6 cm)   · Current Body Wt: 108 lb (49 kg)(5/14 bed scale)  · Admission Body Wt: 108 lb (49 kg)(5/14 bed scale)  · Usual Body Wt: 116 lb (52.6 kg)  · % Weight Change:    Pt has had 6.90% weight change in the 3 weeks.   · Ideal Body Wt: 130 lb (59 kg) % Ideal Body 83%  · BMI Classification: BMI 18.5 - 24.9 Normal Weight(BMI 18.5)    Nutrition Interventions:   Continue current diet, Start ONS  Continued Inpatient Monitoring, Coordination of Care    Nutrition Evaluation:   · Evaluation: Goals set   · Goals: Pt to consume >50% of most meals and ONS    · Monitoring: Meal Intake, Supplement Intake, Diet Tolerance, Skin Integrity, Wound Healing, I&O, Mental Status/Confusion, Weight, Pertinent Labs, Nausea or Vomiting, Diarrhea, Monitor Bowel Function      Electronically signed by Arnulfo Butcher RD, LD on 5/15/20 at 12:56 PM EDT    Contact Number: [phone]

## 2020-05-16 LAB
ANION GAP SERPL CALCULATED.3IONS-SCNC: 12 MMOL/L (ref 7–16)
BASOPHILS ABSOLUTE: 0.09 E9/L (ref 0–0.2)
BASOPHILS RELATIVE PERCENT: 0.6 % (ref 0–2)
BUN BLDV-MCNC: 44 MG/DL (ref 8–23)
CALCIUM SERPL-MCNC: 8.5 MG/DL (ref 8.6–10.2)
CHLORIDE BLD-SCNC: 107 MMOL/L (ref 98–107)
CO2: 23 MMOL/L (ref 22–29)
CREAT SERPL-MCNC: 2 MG/DL (ref 0.7–1.2)
EOSINOPHILS ABSOLUTE: 0.66 E9/L (ref 0.05–0.5)
EOSINOPHILS RELATIVE PERCENT: 4.2 % (ref 0–6)
GFR AFRICAN AMERICAN: 39
GFR NON-AFRICAN AMERICAN: 32 ML/MIN/1.73
GLUCOSE BLD-MCNC: 93 MG/DL (ref 74–99)
HCT VFR BLD CALC: 32.6 % (ref 37–54)
HEMOGLOBIN: 10.6 G/DL (ref 12.5–16.5)
IMMATURE GRANULOCYTES #: 0.08 E9/L
IMMATURE GRANULOCYTES %: 0.5 % (ref 0–5)
LYMPHOCYTES ABSOLUTE: 2.32 E9/L (ref 1.5–4)
LYMPHOCYTES RELATIVE PERCENT: 14.7 % (ref 20–42)
MCH RBC QN AUTO: 29 PG (ref 26–35)
MCHC RBC AUTO-ENTMCNC: 32.5 % (ref 32–34.5)
MCV RBC AUTO: 89.3 FL (ref 80–99.9)
MONOCYTES ABSOLUTE: 1.31 E9/L (ref 0.1–0.95)
MONOCYTES RELATIVE PERCENT: 8.3 % (ref 2–12)
NEUTROPHILS ABSOLUTE: 11.3 E9/L (ref 1.8–7.3)
NEUTROPHILS RELATIVE PERCENT: 71.7 % (ref 43–80)
PDW BLD-RTO: 14.5 FL (ref 11.5–15)
PLATELET # BLD: 206 E9/L (ref 130–450)
PMV BLD AUTO: 11.4 FL (ref 7–12)
POTASSIUM SERPL-SCNC: 4.5 MMOL/L (ref 3.5–5)
RBC # BLD: 3.65 E12/L (ref 3.8–5.8)
SODIUM BLD-SCNC: 142 MMOL/L (ref 132–146)
WBC # BLD: 15.8 E9/L (ref 4.5–11.5)

## 2020-05-16 PROCEDURE — 6370000000 HC RX 637 (ALT 250 FOR IP): Performed by: INTERNAL MEDICINE

## 2020-05-16 PROCEDURE — 80048 BASIC METABOLIC PNL TOTAL CA: CPT

## 2020-05-16 PROCEDURE — 6360000002 HC RX W HCPCS: Performed by: INTERNAL MEDICINE

## 2020-05-16 PROCEDURE — 1200000000 HC SEMI PRIVATE

## 2020-05-16 PROCEDURE — C9113 INJ PANTOPRAZOLE SODIUM, VIA: HCPCS | Performed by: INTERNAL MEDICINE

## 2020-05-16 PROCEDURE — 36415 COLL VENOUS BLD VENIPUNCTURE: CPT

## 2020-05-16 PROCEDURE — 85025 COMPLETE CBC W/AUTO DIFF WBC: CPT

## 2020-05-16 PROCEDURE — 2580000003 HC RX 258: Performed by: INTERNAL MEDICINE

## 2020-05-16 RX ADMIN — METRONIDAZOLE 500 MG: 500 TABLET ORAL at 09:22

## 2020-05-16 RX ADMIN — PANTOPRAZOLE SODIUM 40 MG: 40 INJECTION, POWDER, FOR SOLUTION INTRAVENOUS at 09:21

## 2020-05-16 RX ADMIN — SODIUM CHLORIDE, POTASSIUM CHLORIDE, SODIUM LACTATE AND CALCIUM CHLORIDE: 600; 310; 30; 20 INJECTION, SOLUTION INTRAVENOUS at 21:21

## 2020-05-16 RX ADMIN — HEPARIN SODIUM 5000 UNITS: 5000 INJECTION, SOLUTION INTRAVENOUS; SUBCUTANEOUS at 21:19

## 2020-05-16 RX ADMIN — METRONIDAZOLE 500 MG: 500 TABLET ORAL at 21:19

## 2020-05-16 RX ADMIN — SODIUM CHLORIDE, POTASSIUM CHLORIDE, SODIUM LACTATE AND CALCIUM CHLORIDE: 600; 310; 30; 20 INJECTION, SOLUTION INTRAVENOUS at 01:48

## 2020-05-16 RX ADMIN — ATENOLOL 37.5 MG: 25 TABLET ORAL at 09:21

## 2020-05-16 RX ADMIN — HEPARIN SODIUM 5000 UNITS: 5000 INJECTION, SOLUTION INTRAVENOUS; SUBCUTANEOUS at 09:22

## 2020-05-16 ASSESSMENT — PAIN SCALES - GENERAL: PAINLEVEL_OUTOF10: 0

## 2020-05-16 NOTE — PROGRESS NOTES
morning. Still pending urine culture. 5/16/2020  Patient seen and examined on telemetry floor. Patient again is little bit more alert and oriented today. Patient denies any chest pain, abdominal pain, nausea vomiting or unusual shortness of breath. Patient is noted to have multiple loose bowel movements. Bowel movements are not watery and are semi-formed. There is no melena or hematochezia. Oral intake is fairly good. BUN/creatinine was 44/2.0 today with significant improvement since admission. Procalcitonin 0.41. WBC is 15.8 with hemoglobin 10.6. Temperature 97.4 with heart rate of 80. Blood pressure 118/57. O2 sats 93-97% on room air. Urinary output is very good. Past Medical History:    Past Medical History:   Diagnosis Date    Cerebral artery occlusion with cerebral infarction ([address]     COPD (chronic obstructive pulmonary disease) (HCC)     Enlarged prostate     Hyperlipidemia      Past Surgical History:    Past Surgical History:   Procedure Laterality Date    UPPER GASTROINTESTINAL ENDOSCOPY N/A 3/21/2019    EGD BIOPSY performed by Prachi Head MD at [address] [place] [address]  3/21/2019    EGD DILATION BALLOON performed by Prachi Head MD at [facility] ENDOSCOPY       Medications Prior to Admission:    @  Prior to Admission medications    Medication Sig Start Date End Date Taking?  Authorizing Provider   meloxicam (MOBIC) 15 MG tablet Take 15 mg by mouth daily   Yes Historical Provider, MD   mirtazapine (REMERON) 15 MG tablet Take 15 mg by mouth nightly   Yes Historical Provider, MD   omeprazole (PRILOSEC) 20 MG delayed release capsule Take 20 mg by mouth daily   Yes Historical Provider, MD   atenolol (TENORMIN) 25 MG tablet Take 1.5 tablets by mouth daily 3/24/19  Yes Kaylyn Rosales DO   amLODIPine (NORVASC) 10 MG tablet Take 1 tablet by mouth daily 3/24/19  Yes Kaylyn Rosales DO   ferrous sulfate 325 (65 Fe) MG tablet Take 1 tablet by mouth daily (with breakfast) 3/23/19  Yes Liv Arabia, DO       Allergies:  Patient has no known allergies. Social History:   Social History     Socioeconomic History    Marital status:      Spouse name: Not on file    Number of children: Not on file    Years of education: Not on file    Highest education level: Not on file   Occupational History    Not on file   Social Needs    Financial resource strain: Not on file    Food insecurity     Worry: Not on file     Inability: Not on file   [employer] needs     Medical: Not on file     Non-medical: Not on file   Tobacco Use    Smoking status: Current Every Day Smoker     Packs/day: 2.00     Types: Cigarettes    Smokeless tobacco: Never Used    Tobacco comment: chain smoker per patient's son   Substance and Sexual Activity    Alcohol use: Never     Frequency: Never    Drug use: Never    Sexual activity: Not on file   Lifestyle    Physical activity     Days per week: Not on file     Minutes per session: Not on file    Stress: Not on file   Relationships    Social connections     Talks on phone: Not on file     Gets together: Not on file     Attends Sikhism service: Not on file     Active member of club or organization: Not on file     Attends meetings of clubs or organizations: Not on file     Relationship status: Not on file    Intimate partner violence     Fear of current or ex partner: Not on file     Emotionally abused: Not on file     Physically abused: Not on file     Forced sexual activity: Not on file   Other Topics Concern    Not on file   Social History Narrative    Not on file       Family History:   History reviewed. No pertinent family history. REVIEW OF SYSTEMS:    Gen: + Generalized weakness and fatigue. Patient denies any lightheadedness or dizziness. No LOC or syncope. No fevers or chills. HEENT: No earache, sore throat or nasal congestion. Resp: Denies cough, hemoptysis or sputum production.     Cardiac: Denies chest pain, SOB, diaphoresis or palpitations. GI: + nausea, vomiting, diarrhea, no constipation. No melena or hematochezia. : No urinary complaints, dysuria, hematuria or frequency. MSK: No extremity weakness, paralysis or paresthesias. PHYSICAL EXAM:    Vitals:  BP (!) 118/57   Pulse 89   Temp 97.4 °F (36.3 °C) (Axillary)   Resp 18   Ht 5' 4\" (1.626 m)   Wt 108 lb (49 kg)   SpO2 93%   BMI 18.54 kg/m²     General:  This is a [de-identified] y.o. yo male who is alert and oriented X 2 in no apparent distress but appears little anxious  HEENT:  Head is normocephalic and atraumatic, PERRLA, EOMI, mucus membranes moist with no pharyngeal erythema or exudate. Neck:  Supple with no carotid bruits, JVD or thyromegaly. No cervical adenopathy  CV:  Regular rate and rhythm, no murmurs  Lungs: Coarse breath sounds to auscultation bilaterally with no wheezes, rales or rhonchi  Abdomen:  Soft, nontender, nondistended, bowel sounds present  Extremities:  No edema, peripheral pulses intact bilaterally  Neuro:  Cranial nerves II-XII grossly intact; motor and sensory function intact with no focal deficits  Skin: Mild rash in groin folds with mild skin breakdown to both gluteal areas.   Patient has a ulcer on his penis    DATA:  CBC with Differential:    Lab Results   Component Value Date    WBC 15.8 05/16/2020    RBC 3.65 05/16/2020    HGB 10.6 05/16/2020    HCT 32.6 05/16/2020     05/16/2020    MCV 89.3 05/16/2020    MCH 29.0 05/16/2020    MCHC 32.5 05/16/2020    RDW 14.5 05/16/2020    NRBC 0.9 03/16/2019    LYMPHOPCT 14.7 05/16/2020    MONOPCT 8.3 05/16/2020    MYELOPCT 1.7 03/18/2019    BASOPCT 0.6 05/16/2020    MONOSABS 1.31 05/16/2020    LYMPHSABS 2.32 05/16/2020    EOSABS 0.66 05/16/2020    BASOSABS 0.09 05/16/2020     CMP:    Lab Results   Component Value Date     05/16/2020    K 4.5 05/16/2020     05/16/2020    CO2 23 05/16/2020    BUN 44 05/16/2020    CREATININE 2.0 05/16/2020    GFRAA 39 05/16/2020 subcu every 12  Rocephin 1 g IV piggyback daily  Flagyl 500 mg 2 times daily    Gypsy Ferrer D.O.  5/16/2020  12:01 PM

## 2020-05-16 NOTE — PLAN OF CARE
Problem: Falls - Risk of:  Goal: Will remain free from falls  Description: Will remain free from falls  5/16/2020 1242 by Amarilis Serna RN  Outcome: Met This Shift     Problem: Falls - Risk of:  Goal: Absence of physical injury  Description: Absence of physical injury  5/16/2020 1242 by Amarilis Serna RN  Outcome: Met This Shift     Problem: Gas Exchange - Impaired:  Goal: Levels of oxygenation will improve  Description: Levels of oxygenation will improve  5/16/2020 1242 by Amarilis Serna RN  Outcome: Met This Shift     Problem: Infection, Septic Shock:  Goal: Will show no infection signs and symptoms  Description: Will show no infection signs and symptoms  5/16/2020 1242 by Amarilis Serna RN  Outcome: Met This Shift

## 2020-05-16 NOTE — PLAN OF CARE
Problem: Falls - Risk of:  Goal: Will remain free from falls  Description: Will remain free from falls  5/15/2020 2138 by Yoel Ramos RN  Outcome: Met This Shift     Problem: Falls - Risk of:  Goal: Absence of physical injury  Description: Absence of physical injury  5/15/2020 2138 by Yoel Ramos RN  Outcome: Met This Shift

## 2020-05-16 NOTE — PLAN OF CARE
Problem: Falls - Risk of:  Goal: Will remain free from falls  Description: Will remain free from falls  5/15/2020 2353 by Johnnie Valdivia RN  Outcome: Met This Shift  5/15/2020 2138 by Anthony Cleveland RN  Outcome: Met This Shift  5/15/2020 1124 by Elvira Simmons RN  Outcome: Met This Shift  Goal: Absence of physical injury  Description: Absence of physical injury  5/15/2020 2353 by Johnnie Valdivia RN  Outcome: Met This Shift  5/15/2020 2138 by Anthony Cleveland RN  Outcome: Met This Shift  5/15/2020 1124 by Elvira Simmons RN  Outcome: Met This Shift     Problem: Gas Exchange - Impaired:  Goal: Levels of oxygenation will improve  Description: Levels of oxygenation will improve  5/15/2020 2353 by Johnnie Valdivia RN  Outcome: Met This Shift  5/15/2020 2138 by Anthony Cleveland RN  Outcome: Met This Shift  5/15/2020 1124 by Elvira Simmons RN  Outcome: Met This Shift     Problem: Infection, Septic Shock:  Goal: Will show no infection signs and symptoms  Description: Will show no infection signs and symptoms  5/15/2020 2353 by Johnnie Valdivia RN  Outcome: Met This Shift  5/15/2020 2138 by Anthony Cleveland RN  Outcome: Met This Shift  5/15/2020 1124 by Elvira Simmons RN  Outcome: Met This Shift     Problem: Inadequate oral food/beverage intake (NI-2.1)  Goal: Food and/or Nutrient Delivery  Description: Individualized approach for food/nutrient provision.   5/15/2020 1255 by Raymond Hidalgo RD, LD  Outcome: Met This Shift

## 2020-05-16 NOTE — PROGRESS NOTES
Patient pulled IV out, attempted x 2 times to insert IV site without success. The charge nurse went to attempt IV insertion, patient refused.

## 2020-05-17 LAB
ANION GAP SERPL CALCULATED.3IONS-SCNC: 14 MMOL/L (ref 7–16)
BACTERIA: ABNORMAL /HPF
BASOPHILS ABSOLUTE: 0.09 E9/L (ref 0–0.2)
BASOPHILS RELATIVE PERCENT: 0.5 % (ref 0–2)
BILIRUBIN URINE: NEGATIVE
BLOOD, URINE: ABNORMAL
BUN BLDV-MCNC: 30 MG/DL (ref 8–23)
CALCIUM SERPL-MCNC: 8.8 MG/DL (ref 8.6–10.2)
CHLORIDE BLD-SCNC: 101 MMOL/L (ref 98–107)
CLARITY: CLEAR
CO2: 26 MMOL/L (ref 22–29)
COLOR: YELLOW
CREAT SERPL-MCNC: 1.7 MG/DL (ref 0.7–1.2)
EOSINOPHILS ABSOLUTE: 0.66 E9/L (ref 0.05–0.5)
EOSINOPHILS RELATIVE PERCENT: 3.9 % (ref 0–6)
EPITHELIAL CELLS, UA: ABNORMAL /HPF
GFR AFRICAN AMERICAN: 47
GFR NON-AFRICAN AMERICAN: 39 ML/MIN/1.73
GLUCOSE BLD-MCNC: 103 MG/DL (ref 74–99)
GLUCOSE URINE: 100 MG/DL
HCT VFR BLD CALC: 35.5 % (ref 37–54)
HEMOGLOBIN: 11.4 G/DL (ref 12.5–16.5)
IMMATURE GRANULOCYTES #: 0.08 E9/L
IMMATURE GRANULOCYTES %: 0.5 % (ref 0–5)
KETONES, URINE: NEGATIVE MG/DL
LEUKOCYTE ESTERASE, URINE: ABNORMAL
LYMPHOCYTES ABSOLUTE: 2.05 E9/L (ref 1.5–4)
LYMPHOCYTES RELATIVE PERCENT: 12.1 % (ref 20–42)
MCH RBC QN AUTO: 28.5 PG (ref 26–35)
MCHC RBC AUTO-ENTMCNC: 32.1 % (ref 32–34.5)
MCV RBC AUTO: 88.8 FL (ref 80–99.9)
MONOCYTES ABSOLUTE: 1.33 E9/L (ref 0.1–0.95)
MONOCYTES RELATIVE PERCENT: 7.9 % (ref 2–12)
NEUTROPHILS ABSOLUTE: 12.69 E9/L (ref 1.8–7.3)
NEUTROPHILS RELATIVE PERCENT: 75.1 % (ref 43–80)
NITRITE, URINE: NEGATIVE
PDW BLD-RTO: 14.6 FL (ref 11.5–15)
PH UA: 7.5 (ref 5–9)
PLATELET # BLD: 202 E9/L (ref 130–450)
PMV BLD AUTO: 10.9 FL (ref 7–12)
POTASSIUM SERPL-SCNC: 3.8 MMOL/L (ref 3.5–5)
PROTEIN UA: NEGATIVE MG/DL
RBC # BLD: 4 E12/L (ref 3.8–5.8)
RBC UA: ABNORMAL /HPF (ref 0–2)
SODIUM BLD-SCNC: 141 MMOL/L (ref 132–146)
SPECIFIC GRAVITY UA: 1.02 (ref 1–1.03)
UROBILINOGEN, URINE: 0.2 E.U./DL
WBC # BLD: 16.9 E9/L (ref 4.5–11.5)
WBC UA: ABNORMAL /HPF (ref 0–5)

## 2020-05-17 PROCEDURE — 36415 COLL VENOUS BLD VENIPUNCTURE: CPT

## 2020-05-17 PROCEDURE — 6360000002 HC RX W HCPCS: Performed by: INTERNAL MEDICINE

## 2020-05-17 PROCEDURE — 6370000000 HC RX 637 (ALT 250 FOR IP): Performed by: INTERNAL MEDICINE

## 2020-05-17 PROCEDURE — 1200000000 HC SEMI PRIVATE

## 2020-05-17 PROCEDURE — 81001 URINALYSIS AUTO W/SCOPE: CPT

## 2020-05-17 PROCEDURE — 2580000003 HC RX 258: Performed by: INTERNAL MEDICINE

## 2020-05-17 PROCEDURE — 80048 BASIC METABOLIC PNL TOTAL CA: CPT

## 2020-05-17 PROCEDURE — C9113 INJ PANTOPRAZOLE SODIUM, VIA: HCPCS | Performed by: INTERNAL MEDICINE

## 2020-05-17 PROCEDURE — 85025 COMPLETE CBC W/AUTO DIFF WBC: CPT

## 2020-05-17 RX ADMIN — HEPARIN SODIUM 5000 UNITS: 5000 INJECTION, SOLUTION INTRAVENOUS; SUBCUTANEOUS at 21:17

## 2020-05-17 RX ADMIN — METRONIDAZOLE 500 MG: 500 TABLET ORAL at 09:00

## 2020-05-17 RX ADMIN — PANTOPRAZOLE SODIUM 40 MG: 40 INJECTION, POWDER, FOR SOLUTION INTRAVENOUS at 09:00

## 2020-05-17 RX ADMIN — ATENOLOL 37.5 MG: 25 TABLET ORAL at 09:00

## 2020-05-17 RX ADMIN — METRONIDAZOLE 500 MG: 500 TABLET ORAL at 21:17

## 2020-05-17 RX ADMIN — SODIUM CHLORIDE, POTASSIUM CHLORIDE, SODIUM LACTATE AND CALCIUM CHLORIDE: 600; 310; 30; 20 INJECTION, SOLUTION INTRAVENOUS at 06:09

## 2020-05-17 RX ADMIN — HEPARIN SODIUM 5000 UNITS: 5000 INJECTION, SOLUTION INTRAVENOUS; SUBCUTANEOUS at 09:00

## 2020-05-17 RX ADMIN — SODIUM CHLORIDE, POTASSIUM CHLORIDE, SODIUM LACTATE AND CALCIUM CHLORIDE: 600; 310; 30; 20 INJECTION, SOLUTION INTRAVENOUS at 15:44

## 2020-05-17 ASSESSMENT — PAIN SCALES - GENERAL: PAINLEVEL_OUTOF10: 0

## 2020-05-17 NOTE — PLAN OF CARE
Problem: Falls - Risk of:  Goal: Will remain free from falls  Description: Will remain free from falls  Outcome: Met This Shift  Goal: Absence of physical injury  Description: Absence of physical injury  Outcome: Met This Shift     Problem: Gas Exchange - Impaired:  Goal: Levels of oxygenation will improve  Description: Levels of oxygenation will improve  Outcome: Met This Shift     Problem: Infection, Septic Shock:  Goal: Will show no infection signs and symptoms  Description: Will show no infection signs and symptoms  Outcome: Met This Shift

## 2020-05-17 NOTE — PROGRESS NOTES
Department of Internal Medicine        CHIEF COMPLAINT: Nausea/vomiting, weakness with altered mental status    Reason for Admission: Sepsis    HISTORY OF PRESENT ILLNESS:      The patient is a [de-identified] y.o. male who presents with nausea/vomiting, weakness with altered mental status. The patient lives in the basement of the family home in the family has noticed for the past couple days he is more fatigued and weaker. He has had several episodes of nausea vomiting as well as diarrhea at home. In the ED the patient was tachycardic heart rate about 120 but the patient denied any pain initially. He denied any shortness of breath. Patient does have history of stroke with residual left-sided weakness. He has a history of BUN/creatinine 32/1.1 March 2019 with a hemoglobin of 12. Currently BUN/creatinine 76/2.7 with a lactic acid of 6.5 and a WBC was 30.7 with a hemoglobin 15.4. Analysis had greater than 20 WBCs with moderate leukocyte esterase. Temperature is 97.7 with blood pressure initially 103/82. CT the abdomen showed a fusiform infrarenal abdominal aortic aneurysm 43 x 41 mm. Chest x-ray is unremarkable and a CT the head was negative for intracranial abnormality possible paranasal sinus inflammation. Currently the patient is much more alert and oriented to person and place. Patient currently denies any chest pain, abdominal pain, nausea or vomiting. Patient moves his left arm and leg fairly good. Patient was given IV fluid boluses in the ED and IV antibiotics and admitted to a telemetry floor for sepsis. 5/15/2020  Patient was seen and examined on monitor. Patient is not as talkative today. Patient denies any chest or abdominal pain. Patient though asked nursing to call me to give him his full dose of Remeron at 2 AM this morning. Temperature 98.4 with a heart rate of 76. Blood pressure 126/60. O2 sats 96% on room air. BUN/creatinine still elevated 70/2.5 WBC is 17,000.   Globin is 11.4 this morning. Still pending urine culture. 5/16/2020  Patient seen and examined on telemetry floor. Patient again is little bit more alert and oriented today. Patient denies any chest pain, abdominal pain, nausea vomiting or unusual shortness of breath. Patient is noted to have multiple loose bowel movements. Bowel movements are not watery and are semi-formed. There is no melena or hematochezia. Oral intake is fairly good. BUN/creatinine was 44/2.0 today with significant improvement since admission. Procalcitonin 0.41. WBC is 15.8 with hemoglobin 10.6. Temperature 97.4 with heart rate of 80. Blood pressure 118/57. O2 sats 93-97% on room air. Urinary output is very good. 5/17/2020  Patient seen and examined on telemetry floor. Patient is little bit more alert and oriented today. Patient had problems with pulling out IV lines yesterday but does not appear agitated or confused today. He denies any chest pain, abdominal pain, nausea vomiting or unusual shortness of breath. BUN/creatinine 30/1.7 today. WBC is still 16.9 with hemoglobin 11.4. Urine culture is still pending. Temperature 98.2 with a heart rate 85 with patient in sinus rhythm blood pressure 131/64. O2 sat is 92-95% on room air at rest.  Patient's urinary output is excellent. Oral intake is fair.       Past Medical History:    Past Medical History:   Diagnosis Date    Cerebral artery occlusion with cerebral infarction ([facility]     COPD (chronic obstructive pulmonary disease) (HCC)     Enlarged prostate     Hyperlipidemia      Past Surgical History:    Past Surgical History:   Procedure Laterality Date    UPPER GASTROINTESTINAL ENDOSCOPY N/A 3/21/2019    EGD BIOPSY performed by Suleman Hernandez MD at [address] [place] [address],[address]  3/21/2019    EGD DILATION BALLOON performed by Suleman Hernandez MD at [facility] ENDOSCOPY       Medications Prior to Admission:    @  Prior to Admission medications    Medication Sig Start Date End Date Taking? Authorizing Provider   meloxicam (MOBIC) 15 MG tablet Take 15 mg by mouth daily   Yes Historical Provider, MD   mirtazapine (REMERON) 15 MG tablet Take 15 mg by mouth nightly   Yes Historical Provider, MD   omeprazole (PRILOSEC) 20 MG delayed release capsule Take 20 mg by mouth daily   Yes Historical Provider, MD   atenolol (TENORMIN) 25 MG tablet Take 1.5 tablets by mouth daily 3/24/19  Yes Shawn Meyer DO   amLODIPine (NORVASC) 10 MG tablet Take 1 tablet by mouth daily 3/24/19  Yes Shawn Meyer DO   ferrous sulfate 325 (65 Fe) MG tablet Take 1 tablet by mouth daily (with breakfast) 3/23/19  Yes Shawn Meyer DO       Allergies:  Patient has no known allergies.     Social History:   Social History     Socioeconomic History    Marital status:      Spouse name: Not on file    Number of children: Not on file    Years of education: Not on file    Highest education level: Not on file   Occupational History    Not on file   Social Needs    Financial resource strain: Not on file    Food insecurity     Worry: Not on file     Inability: Not on file    Transportation needs     Medical: Not on file     Non-medical: Not on file   Tobacco Use    Smoking status: Current Every Day Smoker     Packs/day: 2.00     Types: Cigarettes    Smokeless tobacco: Never Used    Tobacco comment: chain smoker per patient's son   Substance and Sexual Activity    Alcohol use: Never     Frequency: Never    Drug use: Never    Sexual activity: Not on file   Lifestyle    Physical activity     Days per week: Not on file     Minutes per session: Not on file    Stress: Not on file   Relationships    Social connections     Talks on phone: Not on file     Gets together: Not on file     Attends Confucianism service: Not on file     Active member of club or organization: Not on file     Attends meetings of clubs or organizations: Not on file     Relationship status: Not on file    Intimate partner violence Fear of current or ex partner: Not on file     Emotionally abused: Not on file     Physically abused: Not on file     Forced sexual activity: Not on file   Other Topics Concern    Not on file   Social History Narrative    Not on file       Family History:   History reviewed. No pertinent family history. REVIEW OF SYSTEMS:    Gen: + Generalized weakness and fatigue. Patient denies any lightheadedness or dizziness. No LOC or syncope. No fevers or chills. HEENT: No earache, sore throat or nasal congestion. Resp: Denies cough, hemoptysis or sputum production. Cardiac: Denies chest pain, SOB, diaphoresis or palpitations. GI: + nausea, vomiting, diarrhea, no constipation. No melena or hematochezia. : No urinary complaints, dysuria, hematuria or frequency. MSK: No extremity weakness, paralysis or paresthesias. PHYSICAL EXAM:    Vitals:  /64   Pulse 85   Temp 98.2 °F (36.8 °C) (Axillary)   Resp 18   Ht 5' 4\" (1.626 m)   Wt 108 lb (49 kg)   SpO2 92%   BMI 18.54 kg/m²     General:  This is a [de-identified] y.o. yo male who is alert and oriented X 2 in no apparent distress but appears little anxious  HEENT:  Head is normocephalic and atraumatic, PERRLA, EOMI, mucus membranes moist with no pharyngeal erythema or exudate. Neck:  Supple with no carotid bruits, JVD or thyromegaly. No cervical adenopathy  CV:  Regular rate and rhythm, no murmurs  Lungs: Coarse breath sounds to auscultation bilaterally with no wheezes, rales or rhonchi  Abdomen:  Soft, nontender, nondistended, bowel sounds present  Extremities:  No edema, peripheral pulses intact bilaterally  Neuro:  Cranial nerves II-XII grossly intact; motor and sensory function intact with no focal deficits  Skin: Mild rash in groin folds with mild skin breakdown to both gluteal areas.   Patient has a ulcer on his penis    DATA:  CBC with Differential:    Lab Results   Component Value Date    WBC 16.9 05/17/2020    RBC 4.00 05/17/2020    HGB 11.4 05/17/2020    HCT 35.5 05/17/2020     05/17/2020    MCV 88.8 05/17/2020    MCH 28.5 05/17/2020    MCHC 32.1 05/17/2020    RDW 14.6 05/17/2020    NRBC 0.9 03/16/2019    LYMPHOPCT 12.1 05/17/2020    MONOPCT 7.9 05/17/2020    MYELOPCT 1.7 03/18/2019    BASOPCT 0.5 05/17/2020    MONOSABS 1.33 05/17/2020    LYMPHSABS 2.05 05/17/2020    EOSABS 0.66 05/17/2020    BASOSABS 0.09 05/17/2020     CMP:    Lab Results   Component Value Date     05/17/2020    K 3.8 05/17/2020     05/17/2020    CO2 26 05/17/2020    BUN 30 05/17/2020    CREATININE 1.7 05/17/2020    GFRAA 47 05/17/2020    LABGLOM 39 05/17/2020    GLUCOSE 103 05/17/2020    PROT 7.0 05/15/2020    LABALBU 3.2 05/15/2020    CALCIUM 8.8 05/17/2020    BILITOT 0.3 05/15/2020    ALKPHOS 96 05/15/2020    AST 17 05/15/2020    ALT 12 05/15/2020     Magnesium:  No results found for: MG  Phosphorus:  No results found for: PHOS  PT/INR:    Lab Results   Component Value Date    PROTIME 13.8 03/20/2019    INR 1.2 03/20/2019     Troponin:    Lab Results   Component Value Date    TROPONINI <0.01 05/14/2020     U/A:    Lab Results   Component Value Date    COLORU Yellow 05/14/2020    PROTEINU >=300 05/14/2020    PHUR 7.5 05/14/2020    WBCUA >20 05/14/2020    RBCUA 1-3 05/14/2020    BACTERIA FEW 05/14/2020    CLARITYU CLOUDY 05/14/2020    SPECGRAV 1.020 05/14/2020    LEUKOCYTESUR MODERATE 05/14/2020    UROBILINOGEN 0.2 05/14/2020    BILIRUBINUR Negative 05/14/2020    BLOODU LARGE 05/14/2020    GLUCOSEU Negative 05/14/2020     ABG:  No results found for: PH, PCO2, PO2, HCO3, BE, THGB, TCO2, O2SAT  HgBA1c:  No results found for: LABA1C  FLP:    Lab Results   Component Value Date    TRIG 73 05/15/2020    HDL 40 05/15/2020    LDLCALC 63 05/15/2020    LABVLDL 15 05/15/2020     TSH:    Lab Results   Component Value Date    TSH 0.555 05/15/2020     IRON:    Lab Results   Component Value Date    IRON 32 03/20/2019     LIPASE:    Lab Results   Component Value Date LIPASE 62 05/14/2020       ASSESSMENT AND PLAN:      Patient Active Problem List    Diagnosis Date Noted    [facility] 05/14/2020    UTI 03/14/2019     Moderate protein-calorie malnutrition ([facility] 03/14/2019    Weakness  Legally blind? 03/13/2019    COPD (chronic obstructive pulmonary disease) (HCC)     Enlarged prostate  History of CVA with mild residual left-sided weakness  Infrarenal abdominal aortic aneurysm- 4.3 x 4.1 cm      Plan:  Admit to telemetry floor  Home Meds reviewed  Procalcitonin 0.41  Lactated Ringer's 75 cc an hour  Pending blood and urine culture  Routine labs in a.m.   Consult PT/OT  Heparin 5000 units subcu every 12  Rocephin 1 g IV piggyback daily  Flagyl 500 mg 2 times daily    Lina Hernandez D.O.  5/17/2020  11:27 AM

## 2020-05-18 VITALS
OXYGEN SATURATION: 93 % | SYSTOLIC BLOOD PRESSURE: 108 MMHG | TEMPERATURE: 98.2 F | HEART RATE: 70 BPM | HEIGHT: 64 IN | BODY MASS INDEX: 18.44 KG/M2 | DIASTOLIC BLOOD PRESSURE: 62 MMHG | RESPIRATION RATE: 16 BRPM | WEIGHT: 108 LBS

## 2020-05-18 LAB
ANION GAP SERPL CALCULATED.3IONS-SCNC: 13 MMOL/L (ref 7–16)
BASOPHILS ABSOLUTE: 0.04 E9/L (ref 0–0.2)
BASOPHILS RELATIVE PERCENT: 0.2 % (ref 0–2)
BUN BLDV-MCNC: 24 MG/DL (ref 8–23)
CALCIUM SERPL-MCNC: 8.5 MG/DL (ref 8.6–10.2)
CHLORIDE BLD-SCNC: 98 MMOL/L (ref 98–107)
CO2: 25 MMOL/L (ref 22–29)
CREAT SERPL-MCNC: 1.5 MG/DL (ref 0.7–1.2)
EOSINOPHILS ABSOLUTE: 0.39 E9/L (ref 0.05–0.5)
EOSINOPHILS RELATIVE PERCENT: 2.3 % (ref 0–6)
GFR AFRICAN AMERICAN: 54
GFR NON-AFRICAN AMERICAN: 45 ML/MIN/1.73
GLUCOSE BLD-MCNC: 117 MG/DL (ref 74–99)
HCT VFR BLD CALC: 34.9 % (ref 37–54)
HEMOGLOBIN: 11.6 G/DL (ref 12.5–16.5)
IMMATURE GRANULOCYTES #: 0.1 E9/L
IMMATURE GRANULOCYTES %: 0.6 % (ref 0–5)
LYMPHOCYTES ABSOLUTE: 1.71 E9/L (ref 1.5–4)
LYMPHOCYTES RELATIVE PERCENT: 10 % (ref 20–42)
MCH RBC QN AUTO: 29.3 PG (ref 26–35)
MCHC RBC AUTO-ENTMCNC: 33.2 % (ref 32–34.5)
MCV RBC AUTO: 88.1 FL (ref 80–99.9)
MONOCYTES ABSOLUTE: 1.15 E9/L (ref 0.1–0.95)
MONOCYTES RELATIVE PERCENT: 6.7 % (ref 2–12)
NEUTROPHILS ABSOLUTE: 13.7 E9/L (ref 1.8–7.3)
NEUTROPHILS RELATIVE PERCENT: 80.2 % (ref 43–80)
PDW BLD-RTO: 14.4 FL (ref 11.5–15)
PLATELET # BLD: 204 E9/L (ref 130–450)
PMV BLD AUTO: 10.6 FL (ref 7–12)
POTASSIUM SERPL-SCNC: 3.8 MMOL/L (ref 3.5–5)
PROCALCITONIN: 0.23 NG/ML (ref 0–0.08)
RBC # BLD: 3.96 E12/L (ref 3.8–5.8)
SARS-COV-2, NAAT: NOT DETECTED
SODIUM BLD-SCNC: 136 MMOL/L (ref 132–146)
WBC # BLD: 17.1 E9/L (ref 4.5–11.5)

## 2020-05-18 PROCEDURE — C9113 INJ PANTOPRAZOLE SODIUM, VIA: HCPCS | Performed by: INTERNAL MEDICINE

## 2020-05-18 PROCEDURE — 85025 COMPLETE CBC W/AUTO DIFF WBC: CPT

## 2020-05-18 PROCEDURE — 97530 THERAPEUTIC ACTIVITIES: CPT

## 2020-05-18 PROCEDURE — 80048 BASIC METABOLIC PNL TOTAL CA: CPT

## 2020-05-18 PROCEDURE — 6360000002 HC RX W HCPCS: Performed by: INTERNAL MEDICINE

## 2020-05-18 PROCEDURE — U0002 COVID-19 LAB TEST NON-CDC: HCPCS

## 2020-05-18 PROCEDURE — 6370000000 HC RX 637 (ALT 250 FOR IP): Performed by: INTERNAL MEDICINE

## 2020-05-18 PROCEDURE — 84145 PROCALCITONIN (PCT): CPT

## 2020-05-18 PROCEDURE — 2580000003 HC RX 258: Performed by: INTERNAL MEDICINE

## 2020-05-18 PROCEDURE — 36415 COLL VENOUS BLD VENIPUNCTURE: CPT

## 2020-05-18 RX ORDER — CEFDINIR 300 MG/1
300 CAPSULE ORAL EVERY 12 HOURS SCHEDULED
Qty: 10 CAPSULE | Refills: 0 | Status: ON HOLD | DISCHARGE
Start: 2020-05-18 | End: 2020-06-01 | Stop reason: HOSPADM

## 2020-05-18 RX ORDER — CEFDINIR 300 MG/1
300 CAPSULE ORAL EVERY 12 HOURS SCHEDULED
Status: DISCONTINUED | OUTPATIENT
Start: 2020-05-18 | End: 2020-05-18 | Stop reason: HOSPADM

## 2020-05-18 RX ADMIN — SODIUM CHLORIDE, POTASSIUM CHLORIDE, SODIUM LACTATE AND CALCIUM CHLORIDE: 600; 310; 30; 20 INJECTION, SOLUTION INTRAVENOUS at 01:49

## 2020-05-18 RX ADMIN — PANTOPRAZOLE SODIUM 40 MG: 40 INJECTION, POWDER, FOR SOLUTION INTRAVENOUS at 08:52

## 2020-05-18 RX ADMIN — ATENOLOL 37.5 MG: 25 TABLET ORAL at 08:51

## 2020-05-18 RX ADMIN — HEPARIN SODIUM 5000 UNITS: 5000 INJECTION, SOLUTION INTRAVENOUS; SUBCUTANEOUS at 08:52

## 2020-05-18 RX ADMIN — CEFDINIR 300 MG: 300 CAPSULE ORAL at 14:21

## 2020-05-18 RX ADMIN — METRONIDAZOLE 500 MG: 500 TABLET ORAL at 08:51

## 2020-05-18 ASSESSMENT — PAIN SCALES - GENERAL: PAINLEVEL_OUTOF10: 0

## 2020-05-18 NOTE — CARE COORDINATION
Ss note:.5/18/2020. 3:23 PM COVID TESTING negative, [facility] aware. Plan is [facility]. Physician ambulance to transfer pt at 6 pm today. Facility liaison, nursing aware. Jonatan Schneider notified by raul Bentley Che, LSW

## 2020-05-18 NOTE — PLAN OF CARE
Problem: Falls - Risk of:  Goal: Will remain free from falls  Description: Will remain free from falls  Outcome: Met This Shift     Problem: Falls - Risk of:  Goal: Absence of physical injury  Description: Absence of physical injury  Outcome: Met This Shift     Problem: Gas Exchange - Impaired:  Goal: Levels of oxygenation will improve  Description: Levels of oxygenation will improve  Outcome: Met This Shift     Problem: Infection, Septic Shock:  Goal: Will show no infection signs and symptoms  Description: Will show no infection signs and symptoms  Outcome: Met This Shift

## 2020-05-18 NOTE — CARE COORDINATION
Ss note:5/18/2020.10:31 AM COVID TESTING NEGATIVE, WILL NEED UPDATED COVID TESTING 48 HOUR PRIOR TO DISCHARGE. Pt has been accepted at [facility], bed available today pending new negative covid result. Son aware, nursing to update son on pts medical condition today. Anticipate possible discharge today. Son wants to transport if appropriate.  SCOTT Lowe

## 2020-05-18 NOTE — PROGRESS NOTES
[facility] Quality Flow/Interdisciplinary Rounds Progress Note        Quality Flow Rounds held on May 18, 2020    Disciplines Attending:  Bedside Nurse, ,  and Nursing Unit Leadership    Silvestre Koch was admitted on 5/14/2020  1:29 PM    Anticipated Discharge Date:  Expected Discharge Date: 05/16/20    Disposition:    Jonathan Score:  Jonathan Scale Score: 14    Readmission Risk              Risk of Unplanned Readmission:        12           Discussed patient goal for the day, patient clinical progression, and barriers to discharge.   The following Goal(s) of the Day/Commitment(s) have been identified:  susan MIRZA, plan is [facility]- no pre-cert- will need COVID testing      Ronnie Bryant  May 18, 2020

## 2020-05-18 NOTE — PROGRESS NOTES
Physical Therapy    Physical Therapy Treatment Note    Room #:  0420/0420-01  Patient Name: Theresa Cramer  YOB: 1940  MRN: [id number]    Referring Provider: Lina Hernandez DO    Date of Service: 5/18/2020    Evaluating Physical Therapist:  Amrita Zelaya PT  Lic. # [id number]      Diagnosis:   Sepsis ([facility] [address] [A41.9]   Admitted with lethargy nausea, vomiting, diarrhea      Patient Active Problem List   Diagnosis    Weakness    COPD (chronic obstructive pulmonary disease) ([address]    Enlarged prostate    Pneumonia    Severe protein-calorie malnutrition ([address]    Sepsis ([address]        Tentative placement recommendation: Subacute vs Home Health Physical Therapy if patient meets goals pending progress and patient's family's ability to assist.     Equipment recommendation:  To be determined      Prior Level of Function: unable to determine; pt denies walking at home  Rehab Potential: good  for goals    Past medical history:   Past Medical History:   Diagnosis Date    Cerebral artery occlusion with cerebral infarction ([facility] [address]     COPD (chronic obstructive pulmonary disease) ([facility] [address]     Enlarged prostate     Hyperlipidemia      Past Surgical History:   Procedure Laterality Date    UPPER GASTROINTESTINAL ENDOSCOPY N/A 3/21/2019    EGD BIOPSY performed by Roxy Godfrey MD at [facility]  3/21/2019    EGD DILATION BALLOON performed by Roxy Godfrey MD at [facility] ENDOSCOPY       Precautions: falls and alarm ,      SUBJECTIVE:    Social history: Patient lives with son  And according to previous charting stays in a finished basement with 10 steps ;  to follow up with phone call to son    Equipment owned: None,       AM-PAC Basic Mobility        AM-PAC Mobility Inpatient   How much difficulty turning over in bed?: A Little  How much difficulty sitting down on / standing up from a chair with arms?: A Lot  How much difficulty moving from lying on back to sitting on side of bed?: A Lot  How much help from another person moving to and from a bed to a chair?: A Lot  How much help from another person needed to walk in hospital room?: A Lot  How much help from another person for climbing 3-5 steps with a railing?: Total  AM-PAC Inpatient Mobility Raw Score : 12  AM-PAC Inpatient T-Scale Score : 35.33  Mobility Inpatient CMS 0-100% Score: 68.66  Mobility Inpatient CMS G-Code Modifier : CL    Nursing cleared patient for PT treatment       OBJECTIVE:   Initial Evaluation  Date: 5/15/20 Treatment Date:  5/18/2020     Short Term/ Long Term   Goals   Was pt agreeable to Eval/treatment? Yes   Yes  To be met in 3 days   Pain level   0/10    0/10    Bed Mobility    Rolling: Minimal assist of 1    Supine to sit: Moderate assist of 1    Sit to supine: Moderate assist of 1    Scooting: Moderate assist of  2   Rolling: Minimal assist of 1  Supine to sit: Moderate assist of 1   Sit to supine: Moderate assist of  2   Scooting: Moderate assist of 1  Rolling: Supervision     Supine to sit: Supervision     Sit to supine: Supervision     Scooting: Supervision      Transfers Sit to stand: Minimal assist of 2   Sit to stand: Not assessed  attempted , pt would not allow. Refusing .    Sit to stand: Supervision      Ambulation    4 side steps using  hand held assist with Moderate assist of  2   with short, shuffling steps not assessed  40 feet using  wheeled walker with Moderate assist of 1    Stair negotiation: ascended and descended   Not assessed    Not assessed    10 steps with min a and rail   ROM Within functional limits      Increase range of motion 10% of affected joints    Strength BUE: 3-/5  RLE:  3-/5  LLE:  3-/5   Increase strength in affected mm groups by 1/3 grade   Balance Sitting EOB:  fair    Dynamic Standing:  poor    Sitting EOB:  fair plus  Dynamic Standing: fair         Patient is Alert & Oriented x person, place and time  and follows directions    Sensation:  Pt denies numbness and tingling to extremities  Edema:  no         Patient education  Patient educated on role of Physical Therapy, risks of immobility and plan of care     Patient response to education:   Pt verbalized understanding Pt demonstrated skill Pt requires further education in this area   Yes Partial Yes       ASSESSMENT:    Comments:  Pt only compliant to sit at side of bed . Pt declining all other activities . Treatment:  Patient practiced and was instructed in the following treatment:      Sat edge of bed 10 minutes with Moderate assist of 1 to increase dynamic sitting balance and activity tolerance. pt needing max cuing to allow listed time at side of bed . Pt then pushing himself back into bed. Therapeutic Exercises: not performed   Attempted , pt pt refused to perform . At end of session, patient in bed with alarm call light and phone within reach,  all lines and tubes intact, nursing notified. Patient would benefit from continued skilled Physical Therapy to improve functional independence and quality of life. PLAN:    Patient is making fair progress towards established goals. Will continue with current Plan of care.       Time in  1110 am  Time out  1127 am    Total Treatment Time  17  minutes    CPT codes:  Therapeutic activities (49714)   17 minutes  1 unit(s)    Main Aguirre, PTA   [facility]

## 2020-05-18 NOTE — DISCHARGE SUMMARY
Department of Internal Medicine        CHIEF COMPLAINT: Nausea/vomiting, weakness with altered mental status    Reason for Admission: Sepsis    HISTORY OF PRESENT ILLNESS:      The patient is a [de-identified] y.o. male who presents with nausea/vomiting, weakness with altered mental status. The patient lives in the basement of the family home in the family has noticed for the past couple days he is more fatigued and weaker. He has had several episodes of nausea vomiting as well as diarrhea at home. In the ED the patient was tachycardic heart rate about 120 but the patient denied any pain initially. He denied any shortness of breath. Patient does have history of stroke with residual left-sided weakness. He has a history of BUN/creatinine 32/1.1 March 2019 with a hemoglobin of 12. Currently BUN/creatinine 76/2.7 with a lactic acid of 6.5 and a WBC was 30.7 with a hemoglobin 15.4. Analysis had greater than 20 WBCs with moderate leukocyte esterase. Temperature is 97.7 with blood pressure initially 103/82. CT the abdomen showed a fusiform infrarenal abdominal aortic aneurysm 43 x 41 mm. Chest x-ray is unremarkable and a CT the head was negative for intracranial abnormality possible paranasal sinus inflammation. Currently the patient is much more alert and oriented to person and place. Patient currently denies any chest pain, abdominal pain, nausea or vomiting. Patient moves his left arm and leg fairly good. Patient was given IV fluid boluses in the ED and IV antibiotics and admitted to a telemetry floor for sepsis. 5/15/2020  Patient was seen and examined on monitor. Patient is not as talkative today. Patient denies any chest or abdominal pain. Patient though asked nursing to call me to give him his full dose of Remeron at 2 AM this morning. Temperature 98.4 with a heart rate of 76. Blood pressure 126/60. O2 sats 96% on room air. BUN/creatinine still elevated 70/2.5 WBC is 17,000.   Globin is 11.4 this morning. Still pending urine culture. 5/16/2020  Patient seen and examined on telemetry floor. Patient again is little bit more alert and oriented today. Patient denies any chest pain, abdominal pain, nausea vomiting or unusual shortness of breath. Patient is noted to have multiple loose bowel movements. Bowel movements are not watery and are semi-formed. There is no melena or hematochezia. Oral intake is fairly good. BUN/creatinine was 44/2.0 today with significant improvement since admission. Procalcitonin 0.41. WBC is 15.8 with hemoglobin 10.6. Temperature 97.4 with heart rate of 80. Blood pressure 118/57. O2 sats 93-97% on room air. Urinary output is very good. 5/17/2020  Patient seen and examined on telemetry floor. Patient is little bit more alert and oriented today. Patient had problems with pulling out IV lines yesterday but does not appear agitated or confused today. He denies any chest pain, abdominal pain, nausea vomiting or unusual shortness of breath. BUN/creatinine 30/1.7 today. WBC is still 16.9 with hemoglobin 11.4. Urine culture is still pending. Temperature 98.2 with a heart rate 85 with patient in sinus rhythm blood pressure 131/64. O2 sat is 92-95% on room air at rest.  Patient's urinary output is excellent. Oral intake is fair. 5/18/2020  Patient seen and examined on telemetry floor. Patient denies any chest or abdominal pain or unusual shortness of breath. There is no nausea or vomiting. Temperature is 97.8 with a heart rate 83. .  Blood pressure currently 138/61. O2 sats 96% on room air. BUN/creatinine is 24/1.5 today. Procalcitonin repeated was 0.23 compared to 0.41. WBC is still 17.1. Repeat urinalysis shows only 1-3 WBCs with few bacteria. Apparently a urine culture which was ordered on admission was not performed. Patient's temperature has been normal throughout hospitalization.   Chest x-ray was negative with O2 saturations have been greater than 90% on room air. Urine output has been very good. Reviewing old records again shows patient's had a persistent leukocytosis in March 2019. Patient's also had some underlying chronic elevation of BUN with a baseline creatinine about 1.1. Patient temperature is been normal so will discharge to the extended care facility with repeat lab work drawn there in the near future. Patient is stable for discharge      Past Medical History:    Past Medical History:   Diagnosis Date    Cerebral artery occlusion with [facility] infarction [facility] [address]     COPD (chronic obstructive pulmonary disease) ([facility] [address]     Enlarged prostate     Hyperlipidemia      Past Surgical History:    Past Surgical History:   Procedure Laterality Date    UPPER GASTROINTESTINAL ENDOSCOPY N/A 3/21/2019    EGD BIOPSY performed by Starla Gonzales MD at [address] [place] [address]  3/21/2019    EGD DILATION BALLOON performed by Starla Gonzales MD at [facility] ENDOSCOPY       Medications Prior to Admission:    @  Prior to Admission medications    Medication Sig Start Date End Date Taking? Authorizing Provider   meloxicam (MOBIC) 15 MG tablet Take 15 mg by mouth daily   Yes Historical Provider, MD   mirtazapine (REMERON) 15 MG tablet Take 15 mg by mouth nightly   Yes Historical Provider, MD   omeprazole (PRILOSEC) 20 MG delayed release capsule Take 20 mg by mouth daily   Yes Historical Provider, MD   atenolol (TENORMIN) 25 MG tablet Take 1.5 tablets by mouth daily 3/24/19  Yes Len Bella DO   amLODIPine (NORVASC) 10 MG tablet Take 1 tablet by mouth daily 3/24/19  Yes Len Bella DO   ferrous sulfate 325 (65 Fe) MG tablet Take 1 tablet by mouth daily (with breakfast) 3/23/19  Yes Len Bella DO       Allergies:  Patient has no known allergies.     Social History:   Social History     Socioeconomic History    Marital status:      Spouse name: Not on file    Number of children: Not on file    Years of education: Not on file    Highest education level: Not on file   Occupational History    Not on file   Social Needs    Financial resource strain: Not on file    Food insecurity     Worry: Not on file     Inability: Not on file    Transportation needs     Medical: Not on file     Non-medical: Not on file   Tobacco Use    Smoking status: Current Every Day Smoker     Packs/day: 2.00     Types: Cigarettes    Smokeless tobacco: Never Used    Tobacco comment: chain smoker per patient's son   Substance and Sexual Activity    Alcohol use: Never     Frequency: Never    Drug use: Never    Sexual activity: Not on file   Lifestyle    Physical activity     Days per week: Not on file     Minutes per session: Not on file    Stress: Not on file   Relationships    Social connections     Talks on phone: Not on file     Gets together: Not on file     Attends Quaker service: Not on file     Active member of club or organization: Not on file     Attends meetings of clubs or organizations: Not on file     Relationship status: Not on file    Intimate partner violence     Fear of current or ex partner: Not on file     Emotionally abused: Not on file     Physically abused: Not on file     Forced sexual activity: Not on file   Other Topics Concern    Not on file   Social History Narrative    Not on file       Family History:   History reviewed. No pertinent family history. REVIEW OF SYSTEMS:    Gen: + Generalized weakness and fatigue. Patient denies any lightheadedness or dizziness. No LOC or syncope. No fevers or chills. HEENT: No earache, sore throat or nasal congestion. Resp: Denies cough, hemoptysis or sputum production. Cardiac: Denies chest pain, SOB, diaphoresis or palpitations. GI: + nausea, vomiting, diarrhea, no constipation. No melena or hematochezia. : No urinary complaints, dysuria, hematuria or frequency. MSK: No extremity weakness, paralysis or paresthesias.      PHYSICAL EXAM:    Vitals:  /61 Pulse 83   Temp 97.8 °F (36.6 °C) (Infrared)   Resp 18   Ht 5' 4\" (1.626 m)   Wt 108 lb (49 kg)   SpO2 96%   BMI 18.54 kg/m²     General:  This is a [de-identified] y.o. yo male who is alert and oriented X 2 in no apparent distress but appears little anxious  HEENT:  Head is normocephalic and atraumatic, PERRLA, EOMI, mucus membranes moist with no pharyngeal erythema or exudate. Neck:  Supple with no carotid bruits, JVD or thyromegaly. No cervical adenopathy  CV:  Regular rate and rhythm, no murmurs  Lungs: Coarse breath sounds to auscultation bilaterally with no wheezes, rales or rhonchi  Abdomen:  Soft, nontender, nondistended, bowel sounds present  Extremities:  No edema, peripheral pulses intact bilaterally  Neuro:  Cranial nerves II-XII grossly intact; motor and sensory function intact with no focal deficits  Skin: Mild rash in groin folds with mild skin breakdown to both gluteal areas.   Patient has a ulcer on his penis    DATA:  CBC with Differential:    Lab Results   Component Value Date    WBC 17.1 05/18/2020    RBC 3.96 05/18/2020    HGB 11.6 05/18/2020    HCT 34.9 05/18/2020     05/18/2020    MCV 88.1 05/18/2020    MCH 29.3 05/18/2020    MCHC 33.2 05/18/2020    RDW 14.4 05/18/2020    NRBC 0.9 03/16/2019    LYMPHOPCT 10.0 05/18/2020    MONOPCT 6.7 05/18/2020    MYELOPCT 1.7 03/18/2019    BASOPCT 0.2 05/18/2020    MONOSABS 1.15 05/18/2020    LYMPHSABS 1.71 05/18/2020    EOSABS 0.39 05/18/2020    BASOSABS 0.04 05/18/2020     CMP:    Lab Results   Component Value Date     05/18/2020    K 3.8 05/18/2020    CL 98 05/18/2020    CO2 25 05/18/2020    BUN 24 05/18/2020    CREATININE 1.5 05/18/2020    GFRAA 54 05/18/2020    LABGLOM 45 05/18/2020    GLUCOSE 117 05/18/2020    PROT 7.0 05/15/2020    LABALBU 3.2 05/15/2020    CALCIUM 8.5 05/18/2020    BILITOT 0.3 05/15/2020    ALKPHOS 96 05/15/2020    AST 17 05/15/2020    ALT 12 05/15/2020     Magnesium:  No results found for: MG  Phosphorus:  No results found for: PHOS  PT/INR:    Lab Results   Component Value Date    PROTIME 13.8 03/20/2019    INR 1.2 03/20/2019     Troponin:    Lab Results   Component Value Date    TROPONINI <0.01 05/14/2020     U/A:    Lab Results   Component Value Date    COLORU Yellow 05/17/2020    PROTEINU Negative 05/17/2020    PHUR 7.5 05/17/2020    WBCUA 1-3 05/17/2020    RBCUA 1-3 05/17/2020    BACTERIA FEW 05/17/2020    CLARITYU Clear 05/17/2020    SPECGRAV 1.020 05/17/2020    LEUKOCYTESUR TRACE 05/17/2020    UROBILINOGEN 0.2 05/17/2020    BILIRUBINUR Negative 05/17/2020    BLOODU TRACE 05/17/2020    GLUCOSEU 100 05/17/2020     ABG:  No results found for: PH, PCO2, PO2, HCO3, BE, THGB, TCO2, O2SAT  HgBA1c:  No results found for: LABA1C  FLP:    Lab Results   Component Value Date    TRIG 73 05/15/2020    HDL 40 05/15/2020    LDLCALC 63 05/15/2020    LABVLDL 15 05/15/2020     TSH:    Lab Results   Component Value Date    TSH 0.555 05/15/2020     IRON:    Lab Results   Component Value Date    IRON 32 03/20/2019     LIPASE:    Lab Results   Component Value Date    LIPASE 62 05/14/2020       ASSESSMENT AND PLAN:      Patient Active Problem List    Diagnosis Date Noted    Sepsis ([facility] [address]  Altered mental status-metabolic encephalopathy-resolved  Nausea/vomiting-resolved 05/14/2020    UTI 03/14/2019     Moderate protein-calorie malnutrition ([facility] [address] 03/14/2019    Weakness  Legally blind? 03/13/2019    COPD (chronic obstructive pulmonary disease) (HCC)     Enlarged prostate  History of CVA with mild residual left-sided weakness  Infrarenal abdominal aortic aneurysm- 4.3 x 4.1 cm  Marked leukocytosis      Plan:  Discharge to extended-care facility today  Lab rec for repeat BMP and CBC in 3 days  Omnicef 300 mg twice daily for another 5 days  Continue home meds  Stop Mobic with patient's increasing BUN/creatinine    Patient needs to follow-up with [facility]  outpatient regarding his aortic aneurysm and chronic health problems.     Kriss Aguilar ORTIZ  5/18/2020  10:38 AM

## 2020-05-18 NOTE — PROGRESS NOTES
Physical Therapy    Physical Therapy Treatment Note    Room #:  0420/0420-01  Patient Name: Donna Gomes  YOB: 1940  MRN: [id number]    Referring Provider: Juan Luis Sutton DO    Date of Service: 5/18/2020    Evaluating Physical Therapist:  Jeancarlos Moran, KALEY  Lic. # [id number]      Diagnosis:   Sepsis ([facility] [address] [A41.9]   Admitted with lethargy nausea, vomiting, diarrhea      Patient Active Problem List   Diagnosis    Weakness    COPD (chronic obstructive pulmonary disease) ([address]    Enlarged prostate    Pneumonia    Severe protein-calorie malnutrition ([address]    Sepsis ([address]        Tentative placement recommendation: Subacute vs Home Health Physical Therapy if patient meets goals pending progress and patient's family's ability to assist.     Equipment recommendation:  To be determined      Prior Level of Function: unable to determine; pt denies walking at home  Rehab Potential: good  for goals    Past medical history:   Past Medical History:   Diagnosis Date    Cerebral artery occlusion with cerebral infarction ([facility] [address]     COPD (chronic obstructive pulmonary disease) ([facility] [address]     Enlarged prostate     Hyperlipidemia      Past Surgical History:   Procedure Laterality Date    UPPER GASTROINTESTINAL ENDOSCOPY N/A 3/21/2019    EGD BIOPSY performed by Michelle Burrows MD at [address] [place] [address]  3/21/2019    EGD DILATION BALLOON performed by Michelle Burrows MD at [facility] ENDOSCOPY       Precautions: falls and alarm ,      SUBJECTIVE:    Social history: Patient lives with son  And according to previous charting stays in a finished basement with 10 steps ;  to follow up with phone call to son    Equipment owned: None,       AM-PAC Basic Mobility        AM-PAC Mobility Inpatient   How much difficulty turning over in bed?: A Little  How much difficulty sitting down on / standing up from a chair with arms?: A Lot  How much difficulty moving from lying on back to sitting on side of bed?: A Lot  How much help from another person moving to and from a bed to a chair?: A Lot  How much help from another person needed to walk in hospital room?: A Lot  How much help from another person for climbing 3-5 steps with a railing?: Total  AM-PAC Inpatient Mobility Raw Score : 12  AM-PAC Inpatient T-Scale Score : 35.33  Mobility Inpatient CMS 0-100% Score: 68.66  Mobility Inpatient CMS G-Code Modifier : CL    Nursing cleared patient for PT treatment       OBJECTIVE:   Initial Evaluation  Date: 5/15/20 Treatment Date:  5/18/2020     Short Term/ Long Term   Goals   Was pt agreeable to Eval/treatment? Yes   Yes  To be met in 3 days   Pain level   0/10    0/10    Bed Mobility    Rolling: Minimal assist of 1    Supine to sit: Moderate assist of 1    Sit to supine: Moderate assist of 1    Scooting: Moderate assist of  2   Rolling: Minimal assist of 1  Supine to sit: Moderate assist of 1   Sit to supine: Moderate assist of  2   Scooting: Moderate assist of 1  Rolling: Supervision     Supine to sit: Supervision     Sit to supine: Supervision     Scooting: Supervision      Transfers Sit to stand: Minimal assist of 2   Sit to stand: Moderate assist of  2 HHA performed x 2 reps to allow HCA's to change pt / perform personal hygiene while up at side of bed .   Sit to stand: Supervision      Ambulation    4 side steps using  hand held assist with Moderate assist of  2   with short, shuffling steps not assessed  40 feet using  wheeled walker with Moderate assist of 1    Stair negotiation: ascended and descended   Not assessed    Not assessed    10 steps with min a and rail   ROM Within functional limits      Increase range of motion 10% of affected joints    Strength BUE: 3-/5  RLE:  3-/5  LLE:  3-/5   Increase strength in affected mm groups by 1/3 grade   Balance Sitting EOB:  fair    Dynamic Standing:  poor    Sitting EOB:  fair plus  Dynamic Standing: fair         Patient is Alert & Oriented x person, place and time and follows directions    Sensation:  Pt denies numbness and tingling to extremities  Edema:  no         Patient education  Patient educated on role of Physical Therapy, risks of immobility and plan of care     Patient response to education:   Pt verbalized understanding Pt demonstrated skill Pt requires further education in this area   Yes Partial Yes       ASSESSMENT:    Comments:  Pt  Agreeable for increase activities to tolerance in pm . Pt having BM needing assist of nursing /PT in room. Treatment:  Patient practiced and was instructed in the following treatment:      Sat edge of bed 10 minutes with Moderate assist of 1 to increase dynamic sitting balance and activity tolerance. Therapeutic Exercises: not performed      At end of session, patient in bed with alarm call light and phone within reach,  all lines and tubes intact, nursing notified. Patient would benefit from continued skilled Physical Therapy to improve functional independence and quality of life. PLAN:    Patient is making fair progress towards established goals. Will continue with current Plan of care.       Time in  1541  Time out  1556    Total Treatment Time  15  minutes    CPT codes:  Therapeutic activities (05638)   15 minutes  1 unit(s)    Amanda Ahn, PTA   [facility]

## 2020-05-19 LAB
BLOOD CULTURE, ROUTINE: NORMAL
CULTURE, BLOOD 2: NORMAL

## 2020-05-21 ENCOUNTER — APPOINTMENT (OUTPATIENT)
Dept: GENERAL RADIOLOGY | Age: 80
DRG: 871 | End: 2020-05-21
Payer: MEDICARE

## 2020-05-21 ENCOUNTER — HOSPITAL ENCOUNTER (INPATIENT)
Age: 80
LOS: 12 days | Discharge: NURSING, REHAB OR LONG-TERM CARE | DRG: 871 | End: 2020-06-02
Attending: EMERGENCY MEDICINE | Admitting: INTERNAL MEDICINE
Payer: MEDICARE

## 2020-05-21 ENCOUNTER — APPOINTMENT (OUTPATIENT)
Dept: CT IMAGING | Age: 80
DRG: 871 | End: 2020-05-21
Payer: MEDICARE

## 2020-05-21 LAB
ALBUMIN SERPL-MCNC: 3.1 G/DL (ref 3.5–5.2)
ALP BLD-CCNC: 81 U/L (ref 40–129)
ALT SERPL-CCNC: 16 U/L (ref 0–40)
ANION GAP SERPL CALCULATED.3IONS-SCNC: 17 MMOL/L (ref 7–16)
AST SERPL-CCNC: 20 U/L (ref 0–39)
BACTERIA: ABNORMAL /HPF
BASOPHILS ABSOLUTE: 0.24 E9/L (ref 0–0.2)
BASOPHILS RELATIVE PERCENT: 0.9 % (ref 0–2)
BILIRUB SERPL-MCNC: 0.4 MG/DL (ref 0–1.2)
BILIRUBIN URINE: NEGATIVE
BLOOD, URINE: ABNORMAL
BUN BLDV-MCNC: 26 MG/DL (ref 8–23)
BURR CELLS: ABNORMAL
C-REACTIVE PROTEIN: 34.1 MG/DL (ref 0–0.4)
CALCIUM SERPL-MCNC: 8.3 MG/DL (ref 8.6–10.2)
CHLORIDE BLD-SCNC: 102 MMOL/L (ref 98–107)
CLARITY: CLEAR
CO2: 25 MMOL/L (ref 22–29)
COLOR: YELLOW
CREAT SERPL-MCNC: 1.8 MG/DL (ref 0.7–1.2)
EKG ATRIAL RATE: 91 BPM
EKG P AXIS: 62 DEGREES
EKG P-R INTERVAL: 128 MS
EKG Q-T INTERVAL: 358 MS
EKG QRS DURATION: 66 MS
EKG QTC CALCULATION (BAZETT): 440 MS
EKG R AXIS: 18 DEGREES
EKG T AXIS: -150 DEGREES
EKG VENTRICULAR RATE: 91 BPM
EOSINOPHILS ABSOLUTE: 0 E9/L (ref 0.05–0.5)
EOSINOPHILS RELATIVE PERCENT: 0 % (ref 0–6)
EPITHELIAL CELLS, UA: ABNORMAL /HPF
GFR AFRICAN AMERICAN: 44
GFR NON-AFRICAN AMERICAN: 36 ML/MIN/1.73
GLUCOSE BLD-MCNC: 130 MG/DL (ref 74–99)
GLUCOSE URINE: NEGATIVE MG/DL
HCT VFR BLD CALC: 36.3 % (ref 37–54)
HEMOGLOBIN: 11.7 G/DL (ref 12.5–16.5)
KETONES, URINE: NEGATIVE MG/DL
LACTIC ACID, SEPSIS: 1.9 MMOL/L (ref 0.5–1.9)
LACTIC ACID, SEPSIS: 3.3 MMOL/L (ref 0.5–1.9)
LEUKOCYTE ESTERASE, URINE: NEGATIVE
LYMPHOCYTES ABSOLUTE: 1.06 E9/L (ref 1.5–4)
LYMPHOCYTES RELATIVE PERCENT: 3.5 % (ref 20–42)
MAGNESIUM: 1.2 MG/DL (ref 1.6–2.6)
MCH RBC QN AUTO: 29 PG (ref 26–35)
MCHC RBC AUTO-ENTMCNC: 32.2 % (ref 32–34.5)
MCV RBC AUTO: 89.9 FL (ref 80–99.9)
METAMYELOCYTES RELATIVE PERCENT: 0.9 % (ref 0–1)
MONOCYTES ABSOLUTE: 2.65 E9/L (ref 0.1–0.95)
MONOCYTES RELATIVE PERCENT: 9.7 % (ref 2–12)
NEUTROPHILS ABSOLUTE: 22.79 E9/L (ref 1.8–7.3)
NEUTROPHILS RELATIVE PERCENT: 85 % (ref 43–80)
NITRITE, URINE: NEGATIVE
OVALOCYTES: ABNORMAL
PDW BLD-RTO: 15.2 FL (ref 11.5–15)
PH UA: 6 (ref 5–9)
PLATELET # BLD: 283 E9/L (ref 130–450)
PMV BLD AUTO: 11.1 FL (ref 7–12)
POIKILOCYTES: ABNORMAL
POLYCHROMASIA: ABNORMAL
POTASSIUM REFLEX MAGNESIUM: 3.2 MMOL/L (ref 3.5–5)
PROCALCITONIN: 5.02 NG/ML (ref 0–0.08)
PROTEIN UA: 100 MG/DL
RBC # BLD: 4.04 E12/L (ref 3.8–5.8)
RBC UA: ABNORMAL /HPF (ref 0–2)
SARS-COV-2, NAAT: NOT DETECTED
SCHISTOCYTES: ABNORMAL
SODIUM BLD-SCNC: 144 MMOL/L (ref 132–146)
SPECIFIC GRAVITY UA: 1.02 (ref 1–1.03)
TARGET CELLS: ABNORMAL
TOTAL PROTEIN: 8.1 G/DL (ref 6.4–8.3)
TROPONIN: 0.01 NG/ML (ref 0–0.03)
UROBILINOGEN, URINE: 0.2 E.U./DL
WBC # BLD: 26.5 E9/L (ref 4.5–11.5)
WBC UA: ABNORMAL /HPF (ref 0–5)

## 2020-05-21 PROCEDURE — 80053 COMPREHEN METABOLIC PANEL: CPT

## 2020-05-21 PROCEDURE — 93005 ELECTROCARDIOGRAM TRACING: CPT | Performed by: STUDENT IN AN ORGANIZED HEALTH CARE EDUCATION/TRAINING PROGRAM

## 2020-05-21 PROCEDURE — 93010 ELECTROCARDIOGRAM REPORT: CPT | Performed by: INTERNAL MEDICINE

## 2020-05-21 PROCEDURE — 84484 ASSAY OF TROPONIN QUANT: CPT

## 2020-05-21 PROCEDURE — 6360000002 HC RX W HCPCS: Performed by: STUDENT IN AN ORGANIZED HEALTH CARE EDUCATION/TRAINING PROGRAM

## 2020-05-21 PROCEDURE — 86140 C-REACTIVE PROTEIN: CPT

## 2020-05-21 PROCEDURE — 83735 ASSAY OF MAGNESIUM: CPT

## 2020-05-21 PROCEDURE — 84145 PROCALCITONIN (PCT): CPT

## 2020-05-21 PROCEDURE — 6360000002 HC RX W HCPCS: Performed by: INTERNAL MEDICINE

## 2020-05-21 PROCEDURE — 71250 CT THORAX DX C-: CPT

## 2020-05-21 PROCEDURE — 87040 BLOOD CULTURE FOR BACTERIA: CPT

## 2020-05-21 PROCEDURE — 2580000003 HC RX 258: Performed by: EMERGENCY MEDICINE

## 2020-05-21 PROCEDURE — 99285 EMERGENCY DEPT VISIT HI MDM: CPT

## 2020-05-21 PROCEDURE — 1200000000 HC SEMI PRIVATE

## 2020-05-21 PROCEDURE — 87088 URINE BACTERIA CULTURE: CPT

## 2020-05-21 PROCEDURE — 85025 COMPLETE CBC W/AUTO DIFF WBC: CPT

## 2020-05-21 PROCEDURE — 83605 ASSAY OF LACTIC ACID: CPT

## 2020-05-21 PROCEDURE — 36415 COLL VENOUS BLD VENIPUNCTURE: CPT

## 2020-05-21 PROCEDURE — 81001 URINALYSIS AUTO W/SCOPE: CPT

## 2020-05-21 PROCEDURE — 2580000003 HC RX 258: Performed by: INTERNAL MEDICINE

## 2020-05-21 PROCEDURE — 71045 X-RAY EXAM CHEST 1 VIEW: CPT

## 2020-05-21 PROCEDURE — 6370000000 HC RX 637 (ALT 250 FOR IP): Performed by: INTERNAL MEDICINE

## 2020-05-21 PROCEDURE — 2580000003 HC RX 258: Performed by: STUDENT IN AN ORGANIZED HEALTH CARE EDUCATION/TRAINING PROGRAM

## 2020-05-21 PROCEDURE — 6370000000 HC RX 637 (ALT 250 FOR IP): Performed by: STUDENT IN AN ORGANIZED HEALTH CARE EDUCATION/TRAINING PROGRAM

## 2020-05-21 PROCEDURE — U0002 COVID-19 LAB TEST NON-CDC: HCPCS

## 2020-05-21 RX ORDER — POTASSIUM CHLORIDE AND SODIUM CHLORIDE 450; 150 MG/100ML; MG/100ML
INJECTION, SOLUTION INTRAVENOUS CONTINUOUS
Status: DISCONTINUED | OUTPATIENT
Start: 2020-05-21 | End: 2020-05-25

## 2020-05-21 RX ORDER — MIRTAZAPINE 15 MG/1
15 TABLET, FILM COATED ORAL NIGHTLY
Status: DISCONTINUED | OUTPATIENT
Start: 2020-05-21 | End: 2020-05-23

## 2020-05-21 RX ORDER — SODIUM CHLORIDE 0.9 % (FLUSH) 0.9 %
10 SYRINGE (ML) INJECTION EVERY 12 HOURS SCHEDULED
Status: DISCONTINUED | OUTPATIENT
Start: 2020-05-21 | End: 2020-06-02 | Stop reason: HOSPADM

## 2020-05-21 RX ORDER — ONDANSETRON 2 MG/ML
4 INJECTION INTRAMUSCULAR; INTRAVENOUS EVERY 6 HOURS PRN
Status: DISCONTINUED | OUTPATIENT
Start: 2020-05-21 | End: 2020-06-02 | Stop reason: HOSPADM

## 2020-05-21 RX ORDER — SODIUM CHLORIDE 0.9 % (FLUSH) 0.9 %
10 SYRINGE (ML) INJECTION PRN
Status: DISCONTINUED | OUTPATIENT
Start: 2020-05-21 | End: 2020-06-02 | Stop reason: HOSPADM

## 2020-05-21 RX ORDER — MAGNESIUM SULFATE 1 G/100ML
1 INJECTION INTRAVENOUS ONCE
Status: DISCONTINUED | OUTPATIENT
Start: 2020-05-21 | End: 2020-05-21

## 2020-05-21 RX ORDER — ACETAMINOPHEN 325 MG/1
650 TABLET ORAL EVERY 6 HOURS PRN
COMMUNITY

## 2020-05-21 RX ORDER — 0.9 % SODIUM CHLORIDE 0.9 %
500 INTRAVENOUS SOLUTION INTRAVENOUS ONCE
Status: COMPLETED | OUTPATIENT
Start: 2020-05-21 | End: 2020-05-21

## 2020-05-21 RX ORDER — ACETAMINOPHEN 650 MG/1
650 SUPPOSITORY RECTAL ONCE
Status: COMPLETED | OUTPATIENT
Start: 2020-05-21 | End: 2020-05-21

## 2020-05-21 RX ORDER — ATENOLOL 25 MG/1
37.5 TABLET ORAL DAILY
Status: DISCONTINUED | OUTPATIENT
Start: 2020-05-22 | End: 2020-05-23

## 2020-05-21 RX ORDER — POTASSIUM CHLORIDE 7.45 MG/ML
10 INJECTION INTRAVENOUS ONCE
Status: COMPLETED | OUTPATIENT
Start: 2020-05-21 | End: 2020-05-21

## 2020-05-21 RX ORDER — SODIUM CHLORIDE 9 MG/ML
INJECTION, SOLUTION INTRAVENOUS EVERY 12 HOURS
Status: DISCONTINUED | OUTPATIENT
Start: 2020-05-22 | End: 2020-05-29 | Stop reason: ALTCHOICE

## 2020-05-21 RX ORDER — MAGNESIUM SULFATE IN WATER 40 MG/ML
2 INJECTION, SOLUTION INTRAVENOUS ONCE
Status: COMPLETED | OUTPATIENT
Start: 2020-05-21 | End: 2020-05-21

## 2020-05-21 RX ORDER — ACETAMINOPHEN 500 MG
500 TABLET ORAL EVERY 6 HOURS PRN
Status: DISCONTINUED | OUTPATIENT
Start: 2020-05-21 | End: 2020-06-02 | Stop reason: HOSPADM

## 2020-05-21 RX ORDER — GUAIFENESIN 600 MG/1
600 TABLET, EXTENDED RELEASE ORAL 2 TIMES DAILY
Status: ON HOLD | COMMUNITY
Start: 2020-05-20 | End: 2020-06-01 | Stop reason: HOSPADM

## 2020-05-21 RX ORDER — AMLODIPINE BESYLATE 10 MG/1
10 TABLET ORAL DAILY
Status: DISCONTINUED | OUTPATIENT
Start: 2020-05-22 | End: 2020-05-23

## 2020-05-21 RX ORDER — PANTOPRAZOLE SODIUM 40 MG/1
40 TABLET, DELAYED RELEASE ORAL
Status: DISCONTINUED | OUTPATIENT
Start: 2020-05-22 | End: 2020-05-24

## 2020-05-21 RX ORDER — 0.9 % SODIUM CHLORIDE 0.9 %
1000 INTRAVENOUS SOLUTION INTRAVENOUS ONCE
Status: COMPLETED | OUTPATIENT
Start: 2020-05-21 | End: 2020-05-21

## 2020-05-21 RX ORDER — DOXYCYCLINE HYCLATE 100 MG/1
100 CAPSULE ORAL 2 TIMES DAILY
Status: ON HOLD | COMMUNITY
Start: 2020-05-20 | End: 2020-06-01 | Stop reason: HOSPADM

## 2020-05-21 RX ORDER — FERROUS SULFATE 325(65) MG
325 TABLET ORAL
Status: DISCONTINUED | OUTPATIENT
Start: 2020-05-22 | End: 2020-06-02 | Stop reason: HOSPADM

## 2020-05-21 RX ADMIN — SODIUM CHLORIDE 1000 ML: 9 INJECTION, SOLUTION INTRAVENOUS at 16:00

## 2020-05-21 RX ADMIN — POTASSIUM CHLORIDE AND SODIUM CHLORIDE: 450; 150 INJECTION, SOLUTION INTRAVENOUS at 22:18

## 2020-05-21 RX ADMIN — MIRTAZAPINE 15 MG: 15 TABLET, FILM COATED ORAL at 20:20

## 2020-05-21 RX ADMIN — POTASSIUM CHLORIDE 10 MEQ: 7.46 INJECTION, SOLUTION INTRAVENOUS at 16:01

## 2020-05-21 RX ADMIN — MAGNESIUM SULFATE 2 G: 2 INJECTION INTRAVENOUS at 20:14

## 2020-05-21 RX ADMIN — CEFEPIME HYDROCHLORIDE 2 G: 2 INJECTION, POWDER, FOR SOLUTION INTRAVENOUS at 17:05

## 2020-05-21 RX ADMIN — VANCOMYCIN HYDROCHLORIDE 750 MG: 1 INJECTION, POWDER, LYOPHILIZED, FOR SOLUTION INTRAVENOUS at 21:17

## 2020-05-21 RX ADMIN — ACETAMINOPHEN 650 MG: 650 SUPPOSITORY RECTAL at 13:42

## 2020-05-21 RX ADMIN — SODIUM CHLORIDE 1000 ML: 9 INJECTION, SOLUTION INTRAVENOUS at 13:39

## 2020-05-21 ASSESSMENT — PAIN SCALES - GENERAL: PAINLEVEL_OUTOF10: 0

## 2020-05-21 NOTE — PROGRESS NOTES
Pharmacy Consultation Note  (Antibiotic Dosing and Monitoring)    Initial consult date:   Consulting physician: Dr Berhane Montiel  Drug(s): Vancomycin IV  Indication: Sepsis    Ht Readings from Last 1 Encounters:   20 5' 4\" (1.626 m)     Wt Readings from Last 1 Encounters:   20 121 lb (54.9 kg)       Pt is a [de-identified] yo M admitted to the hospital for sepsis and acute hypoxic respiratory failure. Age/  Gender ActBW IBW  Allergy Information   [de-identified] y.o.     male 54.9 kg 54.7 kg  Patient has no known allergies. Date  WBC BUN/CR UOP Drug/Dose Time   Given Level(s)   (Time) Comments     (#1) 26.5 26/1.8 --- Vancomycin 1,000 mg IV one time <1900>       (#2)            (#3)            (#4)            (#5)            (#6)            (#7)            Estimated Creatinine Clearance: 25 mL/min (A) (based on SCr of 1.8 mg/dL (H)). UOP over the past 24 hours:     No intake or output data in the 24 hours ending 20 1803    Temp max: Temp (24hrs), Av.8 °F (37.7 °C), Min:97.9 °F (36.6 °C), Max:101.1 °F (38.4 °C)      Antibiotic Regimen:  Antibiotic Dose Date Initiated   Cefepime 1 g IV Q12H        Cultures:  available culture and sensitivity results were reviewed in EPIC  Cultures sent and are pending.   Culture Date Result    Blood #1    In process   Blood #2    In process   Urine Culture    In process     Assessment:  · Consulted by Dr. Berhane Montiel to dose/monitor vancomycin  · Goal trough level:  15-20 mcg/mL  · Pt is a [de-identified] y/o M who presented with sepsis and respiratory failure  · Serum creatinine today: 1.8 ;CrCl ~ 25 mL/min; baseline Scr ~ 1.5    Plan:  · Vancomycin 750 mg Q24H  · Level prior to fourth dose  · Follow renal function  · Pharmacist will follow and monitor/adjust dosing as necessary      Thank you for the consult,    Brandan Maher PharmD, [facility] 2020 6:03 PM

## 2020-05-21 NOTE — ED PROVIDER NOTES
79-year-old male full code with a past medical history of dementia, COPD on 3 L home oxygen presents with shortness of breath and a fever. Patient has been recently discharged from the hospital 3 days prior for unknown sepsis. He was placed into a long-term care facility at [facility]. As of today patient has had worsening shortness of breath and fever. States that his fever at the facility was 102.0. Due to patient's mental status history has been limited. Review of symptoms was unable to be obtained because of mental status. Review of Systems   Unable to perform ROS: Patient nonverbal (Able to only say the word \"no. \"  But is not alert and oriented x3..)        Physical Exam  Exam conducted with a chaperone present. Constitutional:       General: He is not in acute distress. Appearance: He is not ill-appearing, toxic-appearing or diaphoretic. Comments: Dry, cachectic, looks in poor health. HENT:      Head: Normocephalic and atraumatic. Nose: Nose normal. No congestion or rhinorrhea. Mouth/Throat:      Mouth: Mucous membranes are dry. Pharynx: No oropharyngeal exudate or posterior oropharyngeal erythema. Comments: Patient has no teeth. Is not wearing dentures. Dry mucous membranes. Eyes:      General: No scleral icterus. Right eye: No discharge. Left eye: No discharge. Extraocular Movements: Extraocular movements intact. Conjunctiva/sclera: Conjunctivae normal.      Pupils: Pupils are equal, round, and reactive to light. Neck:      Musculoskeletal: Normal range of motion and neck supple. No muscular tenderness. Vascular: No carotid bruit. Cardiovascular:      Rate and Rhythm: Normal rate and regular rhythm. Pulses: Normal pulses. Heart sounds: Normal heart sounds. No murmur. No friction rub. No gallop. Pulmonary:      Effort: No tachypnea. Breath sounds: No stridor or decreased air movement.  Examination of the ED Course as of May 21 1628   Thu May 21, 2020   1332 EKG read by me. Heart rate 91. Sinus rhythm. Normal axis deviation. Short NM intervals. No QTC prolongation. No right or left ventricular hypertrophy. No STEMI. There are T wave inversion in V3 through V6    [JV]   1402 Patient was reevaluated at door. Patient is in no acute distress. Saturating at 100% on 3 L nasal cannula. Is hemodynamically stable. Patient's temperature still 101. 1. He just received his rectal Tylenol. [JV]   0693 When compared to previous chest x-ray patient has been stable interstitial congestion. Congestion looks much clearer than previous x-ray. No effusions. XR CHEST PORTABLE [JV]   1426 Patient was reevaluated from doorway. He is in no acute distress. Still hemodynamically stable. His temperature stool is 101.2. Waiting for the lab work to resolve. [JV]   1439 Contact precautions removed. COVID-19:    SARS-CoV-2, NAAT Not Detected [JV]   1550 At bedside. He is in no acute distress. He is still febrile. At 100.0 °F.  Hemodynamically stable at this moment. Patient has decreased magnesium and and potassium will be repleted IV. Because the patient has an elevated white count hypoxia, and afebrile from a nursing facility patient and recent hospital admission patient was treated for HCAP with vancomycin and cefepime. [JV]   Loco Wyman with Dr. Maria E Guy. He will accept patient. [JV]   5 Dr. Maria E Guy was in the department. Evaluated patient. Patient has now speaking in full sentences. Still hemodynamically stable. In no acute distress.     [JV]      ED Course User Index  [JV] Ana Matt MD       --------------------------------------------- PAST HISTORY ---------------------------------------------  Past Medical History:  has a past medical history of Cerebral artery occlusion with cerebral infarction [facility]), COPD (chronic obstructive pulmonary disease) ([facility] [address] Enlarged prostate, and Hyperlipidemia. Past Surgical History:  has a past surgical history that includes Upper gastrointestinal endoscopy (N/A, 3/21/2019) and Upper gastrointestinal endoscopy (3/21/2019). Social History:  reports that he has been smoking cigarettes. He has been smoking about 2.00 packs per day. He has never used smokeless tobacco. He reports that he does not drink alcohol or use drugs. Family History: family history is not on file. The patients home medications have been reviewed. Allergies: Patient has no known allergies.     -------------------------------------------------- RESULTS -------------------------------------------------    LABS:  Results for orders placed or performed during the hospital encounter of 05/21/20   CBC Auto Differential   Result Value Ref Range    WBC 26.5 (H) 4.5 - 11.5 E9/L    RBC 4.04 3.80 - 5.80 E12/L    Hemoglobin 11.7 (L) 12.5 - 16.5 g/dL    Hematocrit 36.3 (L) 37.0 - 54.0 %    MCV 89.9 80.0 - 99.9 fL    MCH 29.0 26.0 - 35.0 pg    MCHC 32.2 32.0 - 34.5 %    RDW 15.2 (H) 11.5 - 15.0 fL    Platelets 905 913 - 741 E9/L    MPV 11.1 7.0 - 12.0 fL    Neutrophils % 85.0 (H) 43.0 - 80.0 %    Lymphocytes % 3.5 (L) 20.0 - 42.0 %    Monocytes % 9.7 2.0 - 12.0 %    Eosinophils % 0.0 0.0 - 6.0 %    Basophils % 0.9 0.0 - 2.0 %    Neutrophils Absolute 22.79 (H) 1.80 - 7.30 E9/L    Lymphocytes Absolute 1.06 (L) 1.50 - 4.00 E9/L    Monocytes Absolute 2.65 (H) 0.10 - 0.95 E9/L    Eosinophils Absolute 0.00 (L) 0.05 - 0.50 E9/L    Basophils Absolute 0.24 (H) 0.00 - 0.20 E9/L    Metamyelocytes Relative 0.9 0.0 - 1.0 %    Polychromasia 1+     Poikilocytes 1+     Schistocytes 1+     Foristell Cells 1+     Ovalocytes 1+     Target Cells 1+    Comprehensive Metabolic Panel w/ Reflex to MG   Result Value Ref Range    Sodium 144 132 - 146 mmol/L    Potassium reflex Magnesium 3.2 (L) 3.5 - 5.0 mmol/L    Chloride 102 98 - 107 mmol/L    CO2 25 22 - 29 mmol/L    Anion Gap 17 (H) 7 - 16 mmol/L    Glucose 130 (H) 74 - 99 mg/dL    BUN 26 (H) 8 - 23 mg/dL    CREATININE 1.8 (H) 0.7 - 1.2 mg/dL    GFR Non-African American 36 >=60 mL/min/1.73    GFR African American 44     Calcium 8.3 (L) 8.6 - 10.2 mg/dL    Total Protein 8.1 6.4 - 8.3 g/dL    Alb 3.1 (L) 3.5 - 5.2 g/dL    Total Bilirubin 0.4 0.0 - 1.2 mg/dL    Alkaline Phosphatase 81 40 - 129 U/L    ALT 16 0 - 40 U/L    AST 20 0 - 39 U/L   Troponin   Result Value Ref Range    Troponin 0.01 0.00 - 0.03 ng/mL   Urinalysis, reflex to microscopic   Result Value Ref Range    Color, UA Yellow Straw/Yellow    Clarity, UA Clear Clear    Glucose, Ur Negative Negative mg/dL    Bilirubin Urine Negative Negative    Ketones, Urine Negative Negative mg/dL    Specific Gravity, UA 1.025 1.005 - 1.030    Blood, Urine SMALL (A) Negative    pH, UA 6.0 5.0 - 9.0    Protein,  (A) Negative mg/dL    Urobilinogen, Urine 0.2 <2.0 E.U./dL    Nitrite, Urine Negative Negative    Leukocyte Esterase, Urine Negative Negative   Lactate, Sepsis   Result Value Ref Range    Lactic Acid, Sepsis 3.3 (H) 0.5 - 1.9 mmol/L   COVID-19   Result Value Ref Range    SARS-CoV-2, NAAT Not Detected Not Detected   Microscopic Urinalysis   Result Value Ref Range    WBC, UA 0-1 0 - 5 /HPF    RBC, UA NONE 0 - 2 /HPF    Epithelial Cells, UA RARE /HPF    Bacteria, UA RARE (A) None Seen /HPF   Magnesium   Result Value Ref Range    Magnesium 1.2 (L) 1.6 - 2.6 mg/dL   EKG 12 Lead   Result Value Ref Range    Ventricular Rate 91 BPM    Atrial Rate 91 BPM    P-R Interval 128 ms    QRS Duration 66 ms    Q-T Interval 358 ms    QTc Calculation (Bazett) 440 ms    P Axis 62 degrees    R Axis 18 degrees    T Axis -150 degrees       RADIOLOGY:  XR CHEST PORTABLE   Final Result   No acute cardiopulmonary disease. Stable interstitial changes throughout the   lungs.              ------------------------- NURSING NOTES AND VITALS REVIEWED ---------------------------  Date / Time Roomed:  5/21/2020 12:42 PM  ED Bed Assignment:

## 2020-05-21 NOTE — ED NOTES
Bed: 06  Expected date:   Expected time:   Means of arrival:   Comments:  Mary Ayers 0, [address]  05/21/20 [id number]

## 2020-05-21 NOTE — ED NOTES
Nurse to nurse report called and given to [address] for room 422-2. Patient incontinent of bowel at this time. Patient will be transported after he is cleaned and linens changed.      Tess Johnson RN  05/21/20 6030

## 2020-05-21 NOTE — H&P
Plan:  Nursing Home meds reviewed  CT the chest without contrast  Echocardiogram  Procalcitonin now  Vancomycin IV piggyback  Cefepime 1 g IV piggyback every 12 hours  Routine labs in a.m.   IV fluids 1 half-normal saline with 20 KCl 100 cc an hour  Magnesium sulfate 2 g IV piggyback x1  Pending blood cultures    Giana Mendoza D.O.  5/21/2020  4:06 PM

## 2020-05-22 LAB
ALBUMIN SERPL-MCNC: 2.4 G/DL (ref 3.5–5.2)
ALP BLD-CCNC: 71 U/L (ref 40–129)
ALT SERPL-CCNC: 15 U/L (ref 0–40)
ANION GAP SERPL CALCULATED.3IONS-SCNC: 13 MMOL/L (ref 7–16)
AST SERPL-CCNC: 29 U/L (ref 0–39)
BASOPHILS ABSOLUTE: 0.08 E9/L (ref 0–0.2)
BASOPHILS RELATIVE PERCENT: 0.4 % (ref 0–2)
BILIRUB SERPL-MCNC: 0.3 MG/DL (ref 0–1.2)
BUN BLDV-MCNC: 20 MG/DL (ref 8–23)
CALCIUM SERPL-MCNC: 7.6 MG/DL (ref 8.6–10.2)
CHLORIDE BLD-SCNC: 105 MMOL/L (ref 98–107)
CO2: 21 MMOL/L (ref 22–29)
CREAT SERPL-MCNC: 1.3 MG/DL (ref 0.7–1.2)
EOSINOPHILS ABSOLUTE: 0.37 E9/L (ref 0.05–0.5)
EOSINOPHILS RELATIVE PERCENT: 1.9 % (ref 0–6)
GFR AFRICAN AMERICAN: >60
GFR NON-AFRICAN AMERICAN: 53 ML/MIN/1.73
GLUCOSE BLD-MCNC: 146 MG/DL (ref 74–99)
HCT VFR BLD CALC: 35.7 % (ref 37–54)
HEMOGLOBIN: 11.3 G/DL (ref 12.5–16.5)
IMMATURE GRANULOCYTES #: 0.14 E9/L
IMMATURE GRANULOCYTES %: 0.7 % (ref 0–5)
LYMPHOCYTES ABSOLUTE: 1.07 E9/L (ref 1.5–4)
LYMPHOCYTES RELATIVE PERCENT: 5.4 % (ref 20–42)
MAGNESIUM: 1.6 MG/DL (ref 1.6–2.6)
MCH RBC QN AUTO: 28.5 PG (ref 26–35)
MCHC RBC AUTO-ENTMCNC: 31.7 % (ref 32–34.5)
MCV RBC AUTO: 90.2 FL (ref 80–99.9)
MONOCYTES ABSOLUTE: 1.49 E9/L (ref 0.1–0.95)
MONOCYTES RELATIVE PERCENT: 7.5 % (ref 2–12)
NEUTROPHILS ABSOLUTE: 16.85 E9/L (ref 1.8–7.3)
NEUTROPHILS RELATIVE PERCENT: 84.1 % (ref 43–80)
PDW BLD-RTO: 14.9 FL (ref 11.5–15)
PHOSPHORUS: 2 MG/DL (ref 2.5–4.5)
PLATELET # BLD: 230 E9/L (ref 130–450)
PMV BLD AUTO: 10.9 FL (ref 7–12)
POTASSIUM SERPL-SCNC: 3.9 MMOL/L (ref 3.5–5)
RBC # BLD: 3.96 E12/L (ref 3.8–5.8)
REASON FOR REJECTION: NORMAL
REJECTED TEST: NORMAL
SEDIMENTATION RATE, ERYTHROCYTE: 117 MM/HR (ref 0–15)
SODIUM BLD-SCNC: 139 MMOL/L (ref 132–146)
TOTAL PROTEIN: 7.1 G/DL (ref 6.4–8.3)
WBC # BLD: 20 E9/L (ref 4.5–11.5)

## 2020-05-22 PROCEDURE — 6360000002 HC RX W HCPCS: Performed by: INTERNAL MEDICINE

## 2020-05-22 PROCEDURE — 97165 OT EVAL LOW COMPLEX 30 MIN: CPT

## 2020-05-22 PROCEDURE — 93306 TTE W/DOPPLER COMPLETE: CPT

## 2020-05-22 PROCEDURE — 2580000003 HC RX 258: Performed by: STUDENT IN AN ORGANIZED HEALTH CARE EDUCATION/TRAINING PROGRAM

## 2020-05-22 PROCEDURE — 1200000000 HC SEMI PRIVATE

## 2020-05-22 PROCEDURE — 85025 COMPLETE CBC W/AUTO DIFF WBC: CPT

## 2020-05-22 PROCEDURE — 94761 N-INVAS EAR/PLS OXIMETRY MLT: CPT

## 2020-05-22 PROCEDURE — 2700000000 HC OXYGEN THERAPY PER DAY

## 2020-05-22 PROCEDURE — 80053 COMPREHEN METABOLIC PANEL: CPT

## 2020-05-22 PROCEDURE — 84100 ASSAY OF PHOSPHORUS: CPT

## 2020-05-22 PROCEDURE — 2580000003 HC RX 258: Performed by: INTERNAL MEDICINE

## 2020-05-22 PROCEDURE — 83735 ASSAY OF MAGNESIUM: CPT

## 2020-05-22 PROCEDURE — 6370000000 HC RX 637 (ALT 250 FOR IP): Performed by: INTERNAL MEDICINE

## 2020-05-22 PROCEDURE — 97530 THERAPEUTIC ACTIVITIES: CPT | Performed by: PHYSICAL THERAPIST

## 2020-05-22 PROCEDURE — 36415 COLL VENOUS BLD VENIPUNCTURE: CPT

## 2020-05-22 PROCEDURE — 97535 SELF CARE MNGMENT TRAINING: CPT

## 2020-05-22 PROCEDURE — 97161 PT EVAL LOW COMPLEX 20 MIN: CPT | Performed by: PHYSICAL THERAPIST

## 2020-05-22 PROCEDURE — 85651 RBC SED RATE NONAUTOMATED: CPT

## 2020-05-22 RX ADMIN — ATENOLOL 37.5 MG: 25 TABLET ORAL at 10:04

## 2020-05-22 RX ADMIN — CEFEPIME 1 G: 1 INJECTION, POWDER, FOR SOLUTION INTRAMUSCULAR; INTRAVENOUS at 05:12

## 2020-05-22 RX ADMIN — POTASSIUM CHLORIDE AND SODIUM CHLORIDE: 450; 150 INJECTION, SOLUTION INTRAVENOUS at 23:10

## 2020-05-22 RX ADMIN — SODIUM CHLORIDE: 9 INJECTION, SOLUTION INTRAVENOUS at 20:42

## 2020-05-22 RX ADMIN — VANCOMYCIN HYDROCHLORIDE 750 MG: 1 INJECTION, POWDER, LYOPHILIZED, FOR SOLUTION INTRAVENOUS at 17:39

## 2020-05-22 RX ADMIN — Medication 10 ML: at 20:40

## 2020-05-22 RX ADMIN — FERROUS SULFATE TAB 325 MG (65 MG ELEMENTAL FE) 325 MG: 325 (65 FE) TAB at 10:05

## 2020-05-22 RX ADMIN — MIRTAZAPINE 15 MG: 15 TABLET, FILM COATED ORAL at 20:38

## 2020-05-22 RX ADMIN — PANTOPRAZOLE SODIUM 40 MG: 40 TABLET, DELAYED RELEASE ORAL at 06:58

## 2020-05-22 RX ADMIN — AMLODIPINE BESYLATE 10 MG: 10 TABLET ORAL at 10:05

## 2020-05-22 RX ADMIN — POTASSIUM CHLORIDE AND SODIUM CHLORIDE: 450; 150 INJECTION, SOLUTION INTRAVENOUS at 08:23

## 2020-05-22 RX ADMIN — CEFEPIME 1 G: 1 INJECTION, POWDER, FOR SOLUTION INTRAMUSCULAR; INTRAVENOUS at 17:39

## 2020-05-22 ASSESSMENT — PAIN SCALES - GENERAL
PAINLEVEL_OUTOF10: 0
PAINLEVEL_OUTOF10: 0

## 2020-05-22 NOTE — PROGRESS NOTES
Room #:   6196/5855-16  Date: 2020       Patient Name: Ian Catherine  : 1940      MRN: [id number]   Referring Provider: Laurie Baeza DO    Patient unavailable for physical therapy eval due to off floor at [facility] Will attempt PT evaluation at a later time. Thank you.        Marilee Jiménez, PT

## 2020-05-22 NOTE — PROGRESS NOTES
OCCUPATIONAL THERAPY  Initial Evaluation  Date:2020  Patient Name: Vianney Rosales  MRN: [id number]  : 1940  ROOM #: [id number]     Referring Provider: Suezanne Heimlich, DO   Evaluating OT: Erlinda Jewell OTR/L [id number]    Placement Recommendation: Subacute    Recommended Adaptive Equipment: none     [facility]   AM-PAC Daily Activity Inpatient   How much help for putting on and taking off regular lower body clothing?: Total  How much help for Bathing?: A Lot  How much help for Toileting?: Total  How much help for putting on and taking off regular upper body clothing?: A Lot  How much help for taking care of personal grooming?: A Lot  How much help for eating meals?: A Little  AM-PAC Inpatient Daily Activity Raw Score: 11  AM-PAC Inpatient ADL T-Scale Score : 29.04  ADL Inpatient CMS 0-100% Score: 70.42  ADL Inpatient CMS G-Code Modifier : CL    Diagnosis:   1. Septicemia ([facility] [address]    2. Hypoxia    3. Sepsis, due to unspecified organism, unspecified whether acute organ dysfunction present [facility])      Pertinent Medical History:   Past Medical History:   Diagnosis Date    Cerebral artery occlusion with cerebral infarction ([facility] [address]     COPD (chronic obstructive pulmonary disease) (HCC)     Enlarged prostate     Hyperlipidemia         Precautions:  falls, 4L O2, Dementia, poor historian, hx of CVA with L sided weakness, impaired vision      Pain Scale: Numeric Rate: no c/o pain; Nursing notified. Social history: pt came to us from SNF, pt was recently discharged from the hospital 4 days ago   PLOF: needs assistance with BADL and IADL   Equipment owned: unknown   Cognition: oriented x 3; follows 2 step directions.     fair  Problem solving skills   fair  Memory    fair  Sequencing  Communication: intact   Visual perceptual skills: impaired, appears to have L side neglect       Glasses: no   Edema: no    Sensation: intact   Hand Dominance:  Left     X  Right     Left Right Comment   Passive range of motion [facility] Active range of motion Limited in all planes   [facility]     Muscle Grade 2/5 3/5    /pinch Strength Intact  Intact     Additional Information: Good  and wfl FMC/dexterity noted during ADL tasks Good  and wfl FMC/dexterity noted during ADL tasks      Functional Assessment:   Initial Eval Status  Date: 5/22/2020 Treatment Status  Date: STGs = LTGs  Time frame: 5-7 days   Feeding Minimal Assist   Independent    Grooming Maximal Assist   Minimal Assist    UB Dressing Maximal Assist to doff and don hospital gown   Minimal Assist    LB Dressing Dependent   Minimal Assist    Bathing Maximal Assist to sponge bathe as pt was incontinent of bowel   Minimal Assist    Toileting Dependent   Minimal Assist    Bed Mobility  Supine to sit: Moderate Assist x 2  Scooting: Moderate Assist s 2  Sit to supine: Moderate Assist x 2  Rolling: Moderate Assist   Supine to sit: Minimal Assist   Sit to supine: Minimal Assist    Functional Transfers Maximal Assist x 2 from EOB   Minimal Assist    Functional Mobility Maximal assist x 2 with hand held assist x 2   Minimal Assist    Activity Tolerance Fair   Good      Balance:   Sitting: fair    Standing: poor with hand held assist x 2     Endurance: fair     Comments: Upon arrival to the room the patient was supine. At end of the session, the patient was supine. Call light and phone within reach. All lines and tubes intact. Overall patient demonstrated decreased independence and safety during completion of ADL/functional transfer/mobility tasks. Pt would benefit from continued skilled OT to increase safety and independence with completion of ADL/IADL tasks for functional independence and quality of life. Treatment: Dependent for hygiene and bathing as pt was incontinent of bowel. Assistance for rolling to change bed linens. Assistance to change gowns. Therapist facilitated bed mobility. Sitting balance at EOB to increase dynamic sitting balance and activity tolerance.  Transfer training with verbal cues to for hand placement throughout evaluation to improve safety, static standing balance with hand held assist x 2 to improve balance, functional mobility with hand held assist x 2 to improve balance, Pt returned to bed. Maximal assist x 2 to scoot up in bed. OT services provided to include instruction/training on safety and adapted techniques for completion of transfers and ADL's. Evaluation Complexity:  · Low Complexity  · History: Brief history including review of medical records relating to the problem  · Exam: 1-3 performance Deficits  · Assistance/Modification: No assistance or modifications required to perform tasks. No comorbities affecting occupational performance. Assessment of current deficits   Functional mobility [x]        ADLs [x]        Strength [x]      Cognition []  Functional transfers  [x]       IADLs [x]        Safety Awareness []      Endurance [x]  Fine Motor Coordination []       Balance [x]       Vision/perception []     Sensation []   Gross Motor Coordination []       ROM []         Delirium []                          Motor Control []    Plan of Care: OT 1-3x/week PRN during hospitalization  ADL retraining [x]   Equipment needs [x]   Neuromuscular re-education [] Energy Conservation Techniques [x]  Functional Transfer training [x] Patient and/or Family Education [x]  Functional Mobility training [x]  Environmental Modifications []  Cognitive re-training []   Compensatory techniques for ADLs [x]  Splinting Needs []   Positioning to improve overall function [x]   Therapeutic Activity [x]  Therapeutic Exercise  []  Visual/Perceptual: []    Delirium prevention/treatment  []  Other:  []    Rehab Potential: Good for established goals developed with patient and family. Patient / Family Goal: no goal stated       Patient and/or family were instructed on functional diagnosis, prognosis/goals and OT plan of care. Demonstrated fair understanding.     Time In:

## 2020-05-22 NOTE — PROGRESS NOTES
MD at [facility]       Medications Prior to Admission:    @  Prior to Admission medications    Medication Sig Start Date End Date Taking? Authorizing Provider   doxycycline hyclate (VIBRAMYCIN) 100 MG capsule Take 100 mg by mouth 2 times daily 5/20/20 5/28/20 Yes Historical Provider, MD   guaiFENesin (MUCINEX) 600 MG extended release tablet Take 600 mg by mouth 2 times daily 5/20/20 5/27/20 Yes Historical Provider, MD   acetaminophen (TYLENOL) 325 MG tablet Take 650 mg by mouth every 6 hours as needed for Pain   Yes Historical Provider, MD   cefdinir (OMNICEF) 300 MG capsule Take 1 capsule by mouth every 12 hours for 5 days 5/18/20 5/23/20 Yes Ramakrishna Gomez,    mirtazapine (REMERON) 15 MG tablet Take 15 mg by mouth nightly   Yes Historical Provider, MD   omeprazole (PRILOSEC) 20 MG delayed release capsule Take 20 mg by mouth daily   Yes Historical Provider, MD   atenolol (TENORMIN) 25 MG tablet Take 1.5 tablets by mouth daily 3/24/19  Yes Roverto Garcia DO   amLODIPine (NORVASC) 10 MG tablet Take 1 tablet by mouth daily 3/24/19  Yes Roverto Garcia DO   ferrous sulfate 325 (65 Fe) MG tablet Take 1 tablet by mouth daily (with breakfast) 3/23/19  Yes Roverto Garcia DO       Allergies:  Patient has no known allergies.     Social History:   Social History     Socioeconomic History    Marital status:      Spouse name: Not on file    Number of children: Not on file    Years of education: Not on file    Highest education level: Not on file   Occupational History    Not on file   Social Needs    Financial resource strain: Not on file    Food insecurity     Worry: Not on file     Inability: Not on file   [employer] needs     Medical: Not on file     Non-medical: Not on file   Tobacco Use    Smoking status: Current Every Day Smoker     Packs/day: 2.00     Types: Cigarettes    Smokeless tobacco: Never Used    Tobacco comment: chain smoker per patient's son   Substance and Sexual Activity 05/21/2020    BILIRUBINUR Negative 05/21/2020    BLOODU SMALL 05/21/2020    GLUCOSEU Negative 05/21/2020     ABG:  No results found for: PH, PCO2, PO2, HCO3, BE, THGB, TCO2, O2SAT  HgBA1c:  No results found for: LABA1C  FLP:    Lab Results   Component Value Date    TRIG 73 05/15/2020    HDL 40 05/15/2020    LDLCALC 63 05/15/2020    LABVLDL 15 05/15/2020     TSH:    Lab Results   Component Value Date    TSH 0.555 05/15/2020     IRON:    Lab Results   Component Value Date    IRON 32 03/20/2019     LIPASE:    Lab Results   Component Value Date    LIPASE 62 05/14/2020       ASSESSMENT AND PLAN:      Patient Active Problem List    Diagnosis Date Noted    Sepsis ([facility] [address] 05/14/2020     Acute hypoxic respiratory failure 03/14/2019    Severe protein-calorie malnutrition ([facility] [address] 03/14/2019    Weakness 03/13/2019    COPD (chronic obstructive pulmonary disease) (HCC)     Enlarged prostate  Chronic leukocytosis      Plan:  Nursing Home meds reviewed  CT the chest without contrast  Echocardiogram pending  Procalcitonin 5.02  Vancomycin IV piggyback  Cefepime 1 g IV piggyback every 12 hours  Routine labs in a.m.   IV fluids 1 half-normal saline with 20 KCl 100 cc an hour  Pending blood cultures    Ryan Grant D.O.  5/22/2020  11:52 AM

## 2020-05-22 NOTE — PROGRESS NOTES
CPT codes:  Low Complexity PT evaluation (23742)  Therapeutic activities (76915)   8 minutes  1 unit(s)    Ashwin Garcia PT

## 2020-05-23 ENCOUNTER — APPOINTMENT (OUTPATIENT)
Dept: CT IMAGING | Age: 80
DRG: 871 | End: 2020-05-23
Payer: MEDICARE

## 2020-05-23 ENCOUNTER — APPOINTMENT (OUTPATIENT)
Dept: ULTRASOUND IMAGING | Age: 80
DRG: 871 | End: 2020-05-23
Payer: MEDICARE

## 2020-05-23 LAB
ANION GAP SERPL CALCULATED.3IONS-SCNC: 13 MMOL/L (ref 7–16)
BASOPHILS ABSOLUTE: 0.06 E9/L (ref 0–0.2)
BASOPHILS RELATIVE PERCENT: 0.3 % (ref 0–2)
BUN BLDV-MCNC: 17 MG/DL (ref 8–23)
CALCIUM SERPL-MCNC: 8.1 MG/DL (ref 8.6–10.2)
CHLORIDE BLD-SCNC: 105 MMOL/L (ref 98–107)
CO2: 22 MMOL/L (ref 22–29)
CREAT SERPL-MCNC: 1.3 MG/DL (ref 0.7–1.2)
EOSINOPHILS ABSOLUTE: 0.69 E9/L (ref 0.05–0.5)
EOSINOPHILS RELATIVE PERCENT: 3.8 % (ref 0–6)
GFR AFRICAN AMERICAN: >60
GFR NON-AFRICAN AMERICAN: 53 ML/MIN/1.73
GLUCOSE BLD-MCNC: 111 MG/DL (ref 74–99)
HCT VFR BLD CALC: 29 % (ref 37–54)
HEMOGLOBIN: 9.1 G/DL (ref 12.5–16.5)
IMMATURE GRANULOCYTES #: 0.16 E9/L
IMMATURE GRANULOCYTES %: 0.9 % (ref 0–5)
LYMPHOCYTES ABSOLUTE: 1.38 E9/L (ref 1.5–4)
LYMPHOCYTES RELATIVE PERCENT: 7.5 % (ref 20–42)
MCH RBC QN AUTO: 28.3 PG (ref 26–35)
MCHC RBC AUTO-ENTMCNC: 31.4 % (ref 32–34.5)
MCV RBC AUTO: 90.1 FL (ref 80–99.9)
MONOCYTES ABSOLUTE: 1.38 E9/L (ref 0.1–0.95)
MONOCYTES RELATIVE PERCENT: 7.5 % (ref 2–12)
NEUTROPHILS ABSOLUTE: 14.69 E9/L (ref 1.8–7.3)
NEUTROPHILS RELATIVE PERCENT: 80 % (ref 43–80)
PDW BLD-RTO: 15.1 FL (ref 11.5–15)
PLATELET # BLD: 278 E9/L (ref 130–450)
PMV BLD AUTO: 10.8 FL (ref 7–12)
POTASSIUM SERPL-SCNC: 3.6 MMOL/L (ref 3.5–5)
RBC # BLD: 3.22 E12/L (ref 3.8–5.8)
SODIUM BLD-SCNC: 140 MMOL/L (ref 132–146)
URINE CULTURE, ROUTINE: NORMAL
WBC # BLD: 18.4 E9/L (ref 4.5–11.5)

## 2020-05-23 PROCEDURE — 2700000000 HC OXYGEN THERAPY PER DAY

## 2020-05-23 PROCEDURE — 2580000003 HC RX 258: Performed by: INTERNAL MEDICINE

## 2020-05-23 PROCEDURE — 70450 CT HEAD/BRAIN W/O DYE: CPT

## 2020-05-23 PROCEDURE — 2500000003 HC RX 250 WO HCPCS: Performed by: INTERNAL MEDICINE

## 2020-05-23 PROCEDURE — 6360000002 HC RX W HCPCS: Performed by: INTERNAL MEDICINE

## 2020-05-23 PROCEDURE — 2060000000 HC ICU INTERMEDIATE R&B

## 2020-05-23 PROCEDURE — 36415 COLL VENOUS BLD VENIPUNCTURE: CPT

## 2020-05-23 PROCEDURE — 2580000003 HC RX 258: Performed by: STUDENT IN AN ORGANIZED HEALTH CARE EDUCATION/TRAINING PROGRAM

## 2020-05-23 PROCEDURE — 6370000000 HC RX 637 (ALT 250 FOR IP): Performed by: INTERNAL MEDICINE

## 2020-05-23 PROCEDURE — 80048 BASIC METABOLIC PNL TOTAL CA: CPT

## 2020-05-23 PROCEDURE — 93880 EXTRACRANIAL BILAT STUDY: CPT

## 2020-05-23 PROCEDURE — 85025 COMPLETE CBC W/AUTO DIFF WBC: CPT

## 2020-05-23 RX ORDER — ACETAMINOPHEN 650 MG/1
650 SUPPOSITORY RECTAL EVERY 4 HOURS PRN
Status: DISCONTINUED | OUTPATIENT
Start: 2020-05-23 | End: 2020-06-02 | Stop reason: HOSPADM

## 2020-05-23 RX ORDER — HEPARIN SODIUM 5000 [USP'U]/ML
5000 INJECTION, SOLUTION INTRAVENOUS; SUBCUTANEOUS 2 TIMES DAILY
Status: DISCONTINUED | OUTPATIENT
Start: 2020-05-23 | End: 2020-06-02 | Stop reason: HOSPADM

## 2020-05-23 RX ORDER — METOPROLOL TARTRATE 5 MG/5ML
2.5 INJECTION INTRAVENOUS EVERY 8 HOURS
Status: DISCONTINUED | OUTPATIENT
Start: 2020-05-23 | End: 2020-06-02 | Stop reason: HOSPADM

## 2020-05-23 RX ORDER — ASPIRIN 81 MG/1
81 TABLET ORAL DAILY
Status: DISCONTINUED | OUTPATIENT
Start: 2020-05-23 | End: 2020-05-24

## 2020-05-23 RX ORDER — AMLODIPINE BESYLATE 5 MG/1
5 TABLET ORAL DAILY
Status: DISCONTINUED | OUTPATIENT
Start: 2020-05-24 | End: 2020-06-02 | Stop reason: HOSPADM

## 2020-05-23 RX ORDER — MIRTAZAPINE 15 MG/1
7.5 TABLET, FILM COATED ORAL NIGHTLY
Status: DISCONTINUED | OUTPATIENT
Start: 2020-05-23 | End: 2020-06-02 | Stop reason: HOSPADM

## 2020-05-23 RX ORDER — HYDRALAZINE HYDROCHLORIDE 20 MG/ML
10 INJECTION INTRAMUSCULAR; INTRAVENOUS EVERY 4 HOURS PRN
Status: DISCONTINUED | OUTPATIENT
Start: 2020-05-23 | End: 2020-06-02 | Stop reason: HOSPADM

## 2020-05-23 RX ADMIN — Medication 10 ML: at 08:47

## 2020-05-23 RX ADMIN — POTASSIUM CHLORIDE AND SODIUM CHLORIDE: 450; 150 INJECTION, SOLUTION INTRAVENOUS at 08:47

## 2020-05-23 RX ADMIN — HEPARIN SODIUM 5000 UNITS: 5000 INJECTION, SOLUTION INTRAVENOUS; SUBCUTANEOUS at 20:12

## 2020-05-23 RX ADMIN — CEFEPIME 1 G: 1 INJECTION, POWDER, FOR SOLUTION INTRAMUSCULAR; INTRAVENOUS at 17:46

## 2020-05-23 RX ADMIN — SODIUM CHLORIDE: 9 INJECTION, SOLUTION INTRAVENOUS at 08:47

## 2020-05-23 RX ADMIN — HEPARIN SODIUM 5000 UNITS: 5000 INJECTION, SOLUTION INTRAVENOUS; SUBCUTANEOUS at 14:14

## 2020-05-23 RX ADMIN — VANCOMYCIN HYDROCHLORIDE 750 MG: 1 INJECTION, POWDER, LYOPHILIZED, FOR SOLUTION INTRAVENOUS at 18:59

## 2020-05-23 RX ADMIN — POTASSIUM CHLORIDE AND SODIUM CHLORIDE: 450; 150 INJECTION, SOLUTION INTRAVENOUS at 18:59

## 2020-05-23 RX ADMIN — CEFEPIME 1 G: 1 INJECTION, POWDER, FOR SOLUTION INTRAMUSCULAR; INTRAVENOUS at 05:15

## 2020-05-23 RX ADMIN — METOPROLOL TARTRATE 2.5 MG: 5 INJECTION INTRAVENOUS at 21:11

## 2020-05-23 RX ADMIN — Medication 10 ML: at 21:09

## 2020-05-23 RX ADMIN — SODIUM CHLORIDE: 9 INJECTION, SOLUTION INTRAVENOUS at 21:45

## 2020-05-23 RX ADMIN — PANTOPRAZOLE SODIUM 40 MG: 40 TABLET, DELAYED RELEASE ORAL at 05:16

## 2020-05-23 ASSESSMENT — PAIN SCALES - PAIN ASSESSMENT IN ADVANCED DEMENTIA (PAINAD)
NEGVOCALIZATION: 0
NEGVOCALIZATION: 0
CONSOLABILITY: 0
CONSOLABILITY: 0
BODYLANGUAGE: 0
TOTALSCORE: 1
TOTALSCORE: 1
BODYLANGUAGE: 0
BREATHING: 1
BREATHING: 1
FACIALEXPRESSION: 0
FACIALEXPRESSION: 0

## 2020-05-23 ASSESSMENT — PAIN SCALES - GENERAL
PAINLEVEL_OUTOF10: 1
PAINLEVEL_OUTOF10: 0
PAINLEVEL_OUTOF10: 1

## 2020-05-23 NOTE — PROGRESS NOTES
Notified son Magaly Louis of transferring  patient to [facility]. Also notified Dr Gab No of transfer too.

## 2020-05-23 NOTE — PROGRESS NOTES
Department of Internal Medicine        CHIEF COMPLAINT: Shortness of breath, fever/chills    Reason for Admission: Sepsis    HISTORY OF PRESENT ILLNESS:      The patient is a [de-identified] y.o. male who presents with recently been discharged from the hospital with sepsis unknown etiology. The patient was sent to the nursing home on Omnicef twice daily with all cultures being negative. Patient developed acute onset fever and chills along with increased shortness of breath at the nursing home. Facility had a temperature 102 degrees. In the ED the patient temperature was 100. The patient was mildly hypoxic. Patient is very weak but is alert and oriented person. Patient mouth is extremely dry. Patient is moving all extremities. He denies any chest or abdominal pain. Patient has a history of COPD. WBC was increased 28.5 and discharge is 17,000. BUN/creatinine was about the same as before with 26/1.8. Lactic acid 3.3. Urinalysis and chest x-ray are negative. 5/22/2020  Patient was seen and examined on telemetry floor. Patient is a little bit weaker today. Nursing stated the patient was much more alert earlier today. Patient has some coarse upper airway breath sounds. BUN/creatinine 20/1.3 today with magnesium is 1.6. WBC is 20,000. Hemoglobin 0.3. Pending urine and blood cultures. Pending echocardiogram.  Temperature 98.6 with a heart rate of 74. Blood pressure 131//79. O2 sats 95% on 3 L.    5/23/2020  Patient was seen and examined on for floor telemetry. Patient is more lethargic again today. Nursing states patient is unable to swallow his food. When he tried to turn his head the patient will knock away her hand with his left arm. BUN/creatinine was 17/1.3. Hemoglobin is 9.1. WBC is 18.4. Temperature 98.5 and heart rate 88. Blood pressure currently 114/59. O2 sats 94% on 3 L.     Past Medical History:    Past Medical History:   Diagnosis Date    Cerebral artery occlusion with cerebral infarction ([facility] [address]     COPD (chronic obstructive pulmonary disease) (HCC)     Enlarged prostate     Hyperlipidemia      Past Surgical History:    Past Surgical History:   Procedure Laterality Date    UPPER GASTROINTESTINAL ENDOSCOPY N/A 3/21/2019    EGD BIOPSY performed by Tiffany Mera MD at [address] [place] [address]  3/21/2019    EGD DILATION BALLOON performed by Tiffany Mera MD at [place] [address]       Medications Prior to Admission:    @  Prior to Admission medications    Medication Sig Start Date End Date Taking? Authorizing Provider   doxycycline hyclate (VIBRAMYCIN) 100 MG capsule Take 100 mg by mouth 2 times daily 5/20/20 5/28/20 Yes Historical Provider, MD   guaiFENesin (MUCINEX) 600 MG extended release tablet Take 600 mg by mouth 2 times daily 5/20/20 5/27/20 Yes Historical Provider, MD   acetaminophen (TYLENOL) 325 MG tablet Take 650 mg by mouth every 6 hours as needed for Pain   Yes Historical Provider, MD   cefdinir (OMNICEF) 300 MG capsule Take 1 capsule by mouth every 12 hours for 5 days 5/18/20 5/23/20 Yes Ramakrishna Gomez, DO   mirtazapine (REMERON) 15 MG tablet Take 15 mg by mouth nightly   Yes Historical Provider, MD   omeprazole (PRILOSEC) 20 MG delayed release capsule Take 20 mg by mouth daily   Yes Historical Provider, MD   atenolol (TENORMIN) 25 MG tablet Take 1.5 tablets by mouth daily 3/24/19  Yes Janeen Hatillo, DO   amLODIPine (NORVASC) 10 MG tablet Take 1 tablet by mouth daily 3/24/19  Yes Ariana Hatillo, DO   ferrous sulfate 325 (65 Fe) MG tablet Take 1 tablet by mouth daily (with breakfast) 3/23/19  Yes Janeen Hatillo, DO       Allergies:  Patient has no known allergies.     Social History:   Social History     Socioeconomic History    Marital status:      Spouse name: Not on file    Number of children: Not on file    Years of education: Not on file    Highest education level: Not on file   Occupational History    Not on file   Social Needs General:  This is a [de-identified] y.o. yo male who is alert and oriented X 1-2 in NAD  HEENT:  Head is normocephalic and atraumatic, PERRLA, EOMI, mucus membranes dry with no pharyngeal erythema or exudate. Neck:  Supple with no carotid bruits, JVD or thyromegaly.   No cervical adenopathy  CV:  Regular rate and rhythm, no murmurs  Lungs: Coarse breath sounds with scattered rales at right lung rater than left to auscultation with mild right-sided rhonchi  Abdomen:  Soft, nontender, nondistended, bowel sounds present  Extremities:  No edema, peripheral pulses intact bilaterally  Neuro:  Cranial nerves II-XII grossly intact; motor and sensory function intact with no focal deficits  Skin:  No rashes, lesions or wounds    DATA:  CBC with Differential:    Lab Results   Component Value Date    WBC 18.4 05/23/2020    RBC 3.22 05/23/2020    HGB 9.1 05/23/2020    HCT 29.0 05/23/2020     05/23/2020    MCV 90.1 05/23/2020    MCH 28.3 05/23/2020    MCHC 31.4 05/23/2020    RDW 15.1 05/23/2020    NRBC 0.9 03/16/2019    METASPCT 0.9 05/21/2020    LYMPHOPCT 7.5 05/23/2020    MONOPCT 7.5 05/23/2020    MYELOPCT 1.7 03/18/2019    BASOPCT 0.3 05/23/2020    MONOSABS 1.38 05/23/2020    LYMPHSABS 1.38 05/23/2020    EOSABS 0.69 05/23/2020    BASOSABS 0.06 05/23/2020     CMP:    Lab Results   Component Value Date     05/23/2020    K 3.6 05/23/2020    K 3.2 05/21/2020     05/23/2020    CO2 22 05/23/2020    BUN 17 05/23/2020    CREATININE 1.3 05/23/2020    GFRAA >60 05/23/2020    LABGLOM 53 05/23/2020    GLUCOSE 111 05/23/2020    PROT 7.1 05/22/2020    LABALBU 2.4 05/22/2020    CALCIUM 8.1 05/23/2020    BILITOT 0.3 05/22/2020    ALKPHOS 71 05/22/2020    AST 29 05/22/2020    ALT 15 05/22/2020     Magnesium:    Lab Results   Component Value Date    MG 1.6 05/22/2020     Phosphorus:    Lab Results   Component Value Date    PHOS 2.0 05/22/2020     PT/INR:    Lab Results   Component Value Date    PROTIME 13.8 03/20/2019    INR 1.2 03/20/2019     Troponin:    Lab Results   Component Value Date    TROPONINI 0.01 05/21/2020     U/A:    Lab Results   Component Value Date    COLORU Yellow 05/21/2020    PROTEINU 100 05/21/2020    PHUR 6.0 05/21/2020    WBCUA 0-1 05/21/2020    RBCUA NONE 05/21/2020    BACTERIA RARE 05/21/2020    CLARITYU Clear 05/21/2020    SPECGRAV 1.025 05/21/2020    LEUKOCYTESUR Negative 05/21/2020    UROBILINOGEN 0.2 05/21/2020    BILIRUBINUR Negative 05/21/2020    BLOODU SMALL 05/21/2020    GLUCOSEU Negative 05/21/2020     ABG:  No results found for: PH, PCO2, PO2, HCO3, BE, THGB, TCO2, O2SAT  HgBA1c:  No results found for: LABA1C  FLP:    Lab Results   Component Value Date    TRIG 73 05/15/2020    HDL 40 05/15/2020    LDLCALC 63 05/15/2020    LABVLDL 15 05/15/2020     TSH:    Lab Results   Component Value Date    TSH 0.555 05/15/2020     IRON:    Lab Results   Component Value Date    IRON 32 03/20/2019     LIPASE:    Lab Results   Component Value Date    LIPASE 62 05/14/2020       ASSESSMENT AND PLAN:      Patient Active Problem List    Diagnosis Date Noted    Sepsis ([facility] [address] 05/14/2020     Acute hypoxic respiratory failure 03/14/2019    Severe protein-calorie malnutrition ([facility] [address] 03/14/2019    Weakness 03/13/2019    COPD (chronic obstructive pulmonary disease) (HCC)     Enlarged prostate  Chronic leukocytosis      Plan:  Nursing Home meds reviewed  Admit to telemetry floor  CT the head without contrast now  Carotid ultrasound now  Would like to do an MRI of the brain but patient most likely would be noncompliant with significant movement    CT the chest without contrast-no significant change in appearance with findings most likely related to chronic interstitial lung disease  Echocardiogram -EF 64% with stage I diastolic dysfunction, +1 MR, +2 AI, +2 TR, pulmonary hypertension at 52 mmHg, no evidence of thrombus or mass  Procalcitonin 5.02  Vancomycin IV piggyback  Cefepime 1 g IV piggyback every 12 hours  Routine labs in

## 2020-05-23 NOTE — DISCHARGE INSTR - COC
days: 18        Elimination:  Continence:   · Bowel: No  · Bladder: No  Urinary Catheter: Insertion Date: 5/21/20   Colostomy/Ileostomy/Ileal Conduit: No       Date of Last BM: 6/1/20    Intake/Output Summary (Last 24 hours) at 6/2/2020 1048  Last data filed at 6/2/2020 0852  Gross per 24 hour   Intake 661.64 ml   Output 775 ml   Net -113.36 ml     I/O last 3 completed shifts: In: 661.6 [P.O.:60; I.V.:601.6]  Out: 151 [Urine:775]    Safety Concerns: At Risk for Falls and Aspiration Risk    Impairments/Disabilities:      Speech and Left sided weakness with left side neglect    Nutrition Therapy:  Current Nutrition Therapy:   - Oral Diet:  Dysphagia 1 pureed  - Oral Nutrition Supplement:  Frozen Oral  three times a day    Routes of Feeding: Oral  Liquids: Honey Thick Liquids  Daily Fluid Restriction: no  Last Modified Barium Swallow with Video (Video Swallowing Test): done on 5/26/20    Treatments at the Time of Hospital Discharge:   Respiratory Treatments: ***  Oxygen Therapy:  is not on home oxygen therapy.   Ventilator:    - No ventilator support    Rehab Therapies: Physical Therapy, Occupational Therapy and Speech/Language Therapy  Weight Bearing Status/Restrictions: No weight bearing restirctions  Other Medical Equipment (for information only, NOT a DME order):  walker  Other Treatments: ***    Patient's personal belongings (please select all that are sent with patient):  None    RN SIGNATURE:  Electronically signed by Kourtney Painter RN on 6/2/20 at 11:00 AM EDT    CASE MANAGEMENT/SOCIAL WORK SECTION    Inpatient Status Date: 05/21/2020    Readmission Risk Assessment Score:  Readmission Risk              Risk of Unplanned Readmission:        16           Discharging to Facility/ Agency   Name: [facility]ret[facility] [phone], Fax [phone],         Dialysis Facility (if applicable)   · Name:  · Address:  · Dialysis Schedule:  · Phone:  · Fax:    / signature: Electronically signed by SCOTT Montenegro on 5/26/2020 at 2:47 PM      PHYSICIAN SECTION    Prognosis: {Prognosis:1229508708}    Condition at Discharge: [address] Patient Condition:[id number]}    Rehab Potential (if transferring to Rehab): {Prognosis:4818763186}    Recommended Labs or Other Treatments After Discharge: ***    Physician Certification: I certify the above information and transfer of Lonny Etienne  is necessary for the continuing treatment of the diagnosis listed and that he requires [facility] for less 30 days. Update Admission H&P: {CHP DME Changes in U[facility]YZ:610003386}    PHYSICIAN SIGNATURE:  Dr Carlos Ortega.

## 2020-05-23 NOTE — PROGRESS NOTES
Pharmacy Consultation Note  (Antibiotic Dosing and Monitoring)    Initial consult date:   Consulting physician: Dr Edmar Dalton  Drug(s): Vancomycin IV  Indication: Sepsis    Ht Readings from Last 1 Encounters:   20 5' 4\" (1.626 m)     Wt Readings from Last 1 Encounters:   20 121 lb (54.9 kg)       Pt is a [de-identified] yo M admitted to the hospital for sepsis and acute hypoxic respiratory failure. Age/  Gender ActBW IBW  Allergy Information   [de-identified] y.o.     male 54.9 kg 54.7 kg  Patient has no known allergies. Date  WBC BUN/CR UOP Drug/Dose Time   Given Level(s)   (Time) Comments     (#1) 26.5 26/1.8 --- Vancomycin 750 mg Q24H 2117       (#2) 20 20/1.3 0.76 mL/kg/hr Vancomycin 750 mg Q24H 1739       (#3) 18.4 17/1.3 --- Vancomycin 750 mg Q24H <1800>       (#4)            (#5)            (#6)            (#7)            Estimated Creatinine Clearance: 35 mL/min (A) (based on SCr of 1.3 mg/dL (H)). UOP over the past 24 hours:       Intake/Output Summary (Last 24 hours) at 2020 0850  Last data filed at 2020 0521  Gross per 24 hour   Intake 900 ml   Output 960 ml   Net -60 ml       Temp max: Temp (24hrs), Av.1 °F (37.3 °C), Min:98.4 °F (36.9 °C), Max:99.6 °F (37.6 °C)      Antibiotic Regimen:  Antibiotic Dose Date Initiated   Cefepime 1 g IV Q12H        Cultures:  available culture and sensitivity results were reviewed in EPIC  Cultures sent and are pending.   Culture Date Result    Blood #1    NGTD   Blood #2    NGTD   Urine Culture    NGTD     Assessment:  · Consulted by Dr. Edmar Dalton to dose/monitor vancomycin  · Goal trough level:  15-20 mcg/mL  · Pt is a [de-identified] y/o M who presented with sepsis and respiratory failure  · Serum creatinine today: 1.3 ;CrCl ~ 35 mL/min; baseline Scr ~ 1.5    Plan:  · Vancomycin 750 mg Q24H  · Level prior to fourth dose  · Follow renal function  · Pharmacist will follow and monitor/adjust dosing as necessary      Thank you for the consult,    Patrizia Schmitt PharmD, BCPS 5/23/2020 8:50 AM

## 2020-05-23 NOTE — PROCEDURES
pericardial effusion nor any definite  intracavitary mass or thrombus or shunt identified on this study.         Jordan Kidd MD    D: 05/23/2020 1:45:22       T: 05/23/2020 1:56:23     [email or web address]  Job#: [id number]     Doc#: [id number]    CC:  Suzette Yo MD

## 2020-05-24 ENCOUNTER — APPOINTMENT (OUTPATIENT)
Dept: GENERAL RADIOLOGY | Age: 80
DRG: 871 | End: 2020-05-24
Payer: MEDICARE

## 2020-05-24 PROBLEM — I63.511 STROKE DUE TO OCCLUSION OF RIGHT MIDDLE CEREBRAL ARTERY (HCC): Status: ACTIVE | Noted: 2020-05-24

## 2020-05-24 LAB
ANION GAP SERPL CALCULATED.3IONS-SCNC: 15 MMOL/L (ref 7–16)
BASOPHILS ABSOLUTE: 0 E9/L (ref 0–0.2)
BASOPHILS RELATIVE PERCENT: 0.3 % (ref 0–2)
BUN BLDV-MCNC: 14 MG/DL (ref 8–23)
BURR CELLS: ABNORMAL
CALCIUM SERPL-MCNC: 8.5 MG/DL (ref 8.6–10.2)
CHLORIDE BLD-SCNC: 99 MMOL/L (ref 98–107)
CO2: 20 MMOL/L (ref 22–29)
CREAT SERPL-MCNC: 1.1 MG/DL (ref 0.7–1.2)
EOSINOPHILS ABSOLUTE: 0.36 E9/L (ref 0.05–0.5)
EOSINOPHILS RELATIVE PERCENT: 1.8 % (ref 0–6)
GFR AFRICAN AMERICAN: >60
GFR NON-AFRICAN AMERICAN: >60 ML/MIN/1.73
GLUCOSE BLD-MCNC: 101 MG/DL (ref 74–99)
HCT VFR BLD CALC: 28.7 % (ref 37–54)
HEMOGLOBIN: 9.3 G/DL (ref 12.5–16.5)
LYMPHOCYTES ABSOLUTE: 1.01 E9/L (ref 1.5–4)
LYMPHOCYTES RELATIVE PERCENT: 5.3 % (ref 20–42)
MCH RBC QN AUTO: 28.5 PG (ref 26–35)
MCHC RBC AUTO-ENTMCNC: 32.4 % (ref 32–34.5)
MCV RBC AUTO: 88 FL (ref 80–99.9)
MONOCYTES ABSOLUTE: 1.41 E9/L (ref 0.1–0.95)
MONOCYTES RELATIVE PERCENT: 7.1 % (ref 2–12)
NEUTROPHILS ABSOLUTE: 17.37 E9/L (ref 1.8–7.3)
NEUTROPHILS RELATIVE PERCENT: 85.8 % (ref 43–80)
OVALOCYTES: ABNORMAL
PDW BLD-RTO: 14.7 FL (ref 11.5–15)
PLATELET # BLD: 320 E9/L (ref 130–450)
PMV BLD AUTO: 11.1 FL (ref 7–12)
POIKILOCYTES: ABNORMAL
POLYCHROMASIA: ABNORMAL
POTASSIUM SERPL-SCNC: 4.1 MMOL/L (ref 3.5–5)
RBC # BLD: 3.26 E12/L (ref 3.8–5.8)
SODIUM BLD-SCNC: 134 MMOL/L (ref 132–146)
VANCOMYCIN TROUGH: 12 MCG/ML (ref 5–16)
WBC # BLD: 20.2 E9/L (ref 4.5–11.5)

## 2020-05-24 PROCEDURE — 80202 ASSAY OF VANCOMYCIN: CPT

## 2020-05-24 PROCEDURE — 80048 BASIC METABOLIC PNL TOTAL CA: CPT

## 2020-05-24 PROCEDURE — 2580000003 HC RX 258: Performed by: INTERNAL MEDICINE

## 2020-05-24 PROCEDURE — 6360000002 HC RX W HCPCS: Performed by: SPECIALIST

## 2020-05-24 PROCEDURE — 2700000000 HC OXYGEN THERAPY PER DAY

## 2020-05-24 PROCEDURE — 36415 COLL VENOUS BLD VENIPUNCTURE: CPT

## 2020-05-24 PROCEDURE — 2500000003 HC RX 250 WO HCPCS: Performed by: INTERNAL MEDICINE

## 2020-05-24 PROCEDURE — 6370000000 HC RX 637 (ALT 250 FOR IP): Performed by: INTERNAL MEDICINE

## 2020-05-24 PROCEDURE — C9113 INJ PANTOPRAZOLE SODIUM, VIA: HCPCS | Performed by: INTERNAL MEDICINE

## 2020-05-24 PROCEDURE — 73120 X-RAY EXAM OF HAND: CPT

## 2020-05-24 PROCEDURE — 85025 COMPLETE CBC W/AUTO DIFF WBC: CPT

## 2020-05-24 PROCEDURE — 6360000002 HC RX W HCPCS: Performed by: INTERNAL MEDICINE

## 2020-05-24 PROCEDURE — 2060000000 HC ICU INTERMEDIATE R&B

## 2020-05-24 RX ORDER — MORPHINE SULFATE 2 MG/ML
2 INJECTION, SOLUTION INTRAMUSCULAR; INTRAVENOUS
Status: DISCONTINUED | OUTPATIENT
Start: 2020-05-24 | End: 2020-05-27

## 2020-05-24 RX ORDER — FLUCONAZOLE 2 MG/ML
400 INJECTION, SOLUTION INTRAVENOUS EVERY 24 HOURS
Status: DISCONTINUED | OUTPATIENT
Start: 2020-05-24 | End: 2020-05-27

## 2020-05-24 RX ORDER — PANTOPRAZOLE SODIUM 40 MG/10ML
40 INJECTION, POWDER, LYOPHILIZED, FOR SOLUTION INTRAVENOUS DAILY
Status: DISCONTINUED | OUTPATIENT
Start: 2020-05-24 | End: 2020-05-27

## 2020-05-24 RX ORDER — ASPIRIN 300 MG/1
150 SUPPOSITORY RECTAL DAILY
Status: DISCONTINUED | OUTPATIENT
Start: 2020-05-24 | End: 2020-06-02 | Stop reason: HOSPADM

## 2020-05-24 RX ADMIN — ASPIRIN 150 MG: 300 SUPPOSITORY RECTAL at 09:43

## 2020-05-24 RX ADMIN — VANCOMYCIN HYDROCHLORIDE 750 MG: 1 INJECTION, POWDER, LYOPHILIZED, FOR SOLUTION INTRAVENOUS at 19:21

## 2020-05-24 RX ADMIN — POTASSIUM CHLORIDE AND SODIUM CHLORIDE: 450; 150 INJECTION, SOLUTION INTRAVENOUS at 09:42

## 2020-05-24 RX ADMIN — PANTOPRAZOLE SODIUM 40 MG: 40 INJECTION, POWDER, FOR SOLUTION INTRAVENOUS at 09:43

## 2020-05-24 RX ADMIN — MORPHINE SULFATE 2 MG: 2 INJECTION, SOLUTION INTRAMUSCULAR; INTRAVENOUS at 09:43

## 2020-05-24 RX ADMIN — METOPROLOL TARTRATE 2.5 MG: 5 INJECTION INTRAVENOUS at 05:38

## 2020-05-24 RX ADMIN — CEFEPIME 1 G: 1 INJECTION, POWDER, FOR SOLUTION INTRAMUSCULAR; INTRAVENOUS at 05:30

## 2020-05-24 RX ADMIN — HEPARIN SODIUM 5000 UNITS: 5000 INJECTION, SOLUTION INTRAVENOUS; SUBCUTANEOUS at 21:04

## 2020-05-24 RX ADMIN — HEPARIN SODIUM 5000 UNITS: 5000 INJECTION, SOLUTION INTRAVENOUS; SUBCUTANEOUS at 07:48

## 2020-05-24 RX ADMIN — FLUCONAZOLE 400 MG: 2 INJECTION, SOLUTION INTRAVENOUS at 13:34

## 2020-05-24 RX ADMIN — METOPROLOL TARTRATE 2.5 MG: 5 INJECTION INTRAVENOUS at 21:04

## 2020-05-24 RX ADMIN — METOPROLOL TARTRATE 2.5 MG: 5 INJECTION INTRAVENOUS at 11:48

## 2020-05-24 RX ADMIN — SODIUM CHLORIDE: 9 INJECTION, SOLUTION INTRAVENOUS at 09:43

## 2020-05-24 RX ADMIN — Medication 10 ML: at 21:04

## 2020-05-24 RX ADMIN — CEFEPIME 1 G: 1 INJECTION, POWDER, FOR SOLUTION INTRAMUSCULAR; INTRAVENOUS at 19:26

## 2020-05-24 ASSESSMENT — PAIN SCALES - PAIN ASSESSMENT IN ADVANCED DEMENTIA (PAINAD)
FACIALEXPRESSION: 1
NEGVOCALIZATION: 0
TOTALSCORE: 7
BREATHING: 1
CONSOLABILITY: 2
NEGVOCALIZATION: 1
CONSOLABILITY: 0
BREATHING: 2
FACIALEXPRESSION: 0
BODYLANGUAGE: 0
TOTALSCORE: 1
BODYLANGUAGE: 1

## 2020-05-24 ASSESSMENT — PAIN SCALES - GENERAL
PAINLEVEL_OUTOF10: 1
PAINLEVEL_OUTOF10: 0

## 2020-05-24 NOTE — PROGRESS NOTES
developed acute fever of 102 and chills on 5/21 at the facility and sent into ED. In ED temp was 100. He was alert to person, weak and moving all extremities. WBC 28.5, BUN/Cr 26/1.8, Lactic acid 3.3. On 5/23 he was unable to swallow food. CT of the head showed acute large CVA involving right occipital, parietal, temporal, and frontal lobes. He is receiving Cefepime and Vancomycin IV. Swallow study was ordered but on hold pending outcome of hospice discussion. Hospice philosophy discussed. A physician needs to verify patient has a life-limiting illness, if disease follows normal progression, life expectancy is six months or less. In hospice care no further aggressive treatment is sought. This means no IV antibiotics, IV fluids, blood transfusions, chemotherapy, radiation therapy, and dialysis. Comfort care and symptom management are the goals of care. The hospice team is the physician, nurse, bath aides, , and . The nurse and aide will visit 1-3 times a week in routine level of hospice care. The LSW will visit within 5 days of electing the hospice benefit. The hospice benefit will cover the staff visits, medications related to his life-limiting illness, and needed equipment such as hospital bed and oxygen. If hospice care is provided in a nursing home the room and board fees are private pay, unless patient has VA benefits or Medicaid. The [address] is used for short term symptom management. In this higher level of hospice care the [facility], [facility] medical director or nurse practitioner will evaluate patients daily. Once the symptoms are controlled the patient is changed to a lower level of hospice care and returned home. The Transition program was discussed but Omar Lopez said his father could not pay the room and board fees. Discussed the use of the [place] for respite stay if patients receive hospice care at home. This stay gives caregivers some time to recoup. Respite care is for 5 day.    Brennan Oh said he was caring for his father at home but is not sure if he can still continue to do so. He and his brothers are not sure about returning to [facility]. I told them if his dad returned to [facility] the hospice team would be another set of hands and eyes on him. We discussed the nutritional aspect of hospice care. We discussed pleasure feeding and decreased need for food and drink as the body nears death. I did give them a copy of the Transition booklet, [facility] brochure, and [facility] brochure. Caitlin Crespo and his brothers would like some time to discuss their options. Caitlin Crespo would like his father to have the swallow study done. I updated charge nurse, Mickie Stephenson of above and family's need to further discuss discharge plan. Caitlin Crespo and Indigo Ness would like a follow up call tomorrow from the [facility]. I informed them the Liaison will contact them tomorrow. Due to the Covid pandemic the family may not be able to come into the hospital again. I assured them that the liaison will call them. [facility] PLAN:  1. [facility] is not providing any care for this patient. 2. [facility] liaison will follow up with family tomorrow to assist in discharge planning. 3. Please contact [facility] at [phone] with any questions.     Discharge Plan:  Discharge Disposition; unknown  Facility Name:     Electronically signed by Ciara Santo RN on 5/24/2020 at 4:29 PM

## 2020-05-24 NOTE — PROGRESS NOTES
Q24H  · Level prior to fourth dose of new regimen   · Follow renal function  · Pharmacist will follow and monitor/adjust dosing as necessary      Thank you for the consult,    Link Jame Weathers, BCPS 5/24/2020 9:06 AM

## 2020-05-24 NOTE — PROGRESS NOTES
release capsule Take 20 mg by mouth daily   Yes Historical Provider, MD   atenolol (TENORMIN) 25 MG tablet Take 1.5 tablets by mouth daily 3/24/19  Yes Linville Cunha, DO   amLODIPine (NORVASC) 10 MG tablet Take 1 tablet by mouth daily 3/24/19  Yes Chantal Cunha, DO   ferrous sulfate 325 (65 Fe) MG tablet Take 1 tablet by mouth daily (with breakfast) 3/23/19  Yes Linville Cunha, DO       Allergies:  Patient has no known allergies.     Social History:   Social History     Socioeconomic History    Marital status:      Spouse name: Not on file    Number of children: Not on file    Years of education: Not on file    Highest education level: Not on file   Occupational History    Not on file   Social Needs    Financial resource strain: Not on file    Food insecurity     Worry: Not on file     Inability: Not on file   [employer] needs     Medical: Not on file     Non-medical: Not on file   Tobacco Use    Smoking status: Current Every Day Smoker     Packs/day: 2.00     Types: Cigarettes    Smokeless tobacco: Never Used    Tobacco comment: chain smoker per patient's son   Substance and Sexual Activity    Alcohol use: Never     Frequency: Never    Drug use: Never    Sexual activity: Not on file   Lifestyle    Physical activity     Days per week: Not on file     Minutes per session: Not on file    Stress: Not on file   Relationships    Social connections     Talks on phone: Not on file     Gets together: Not on file     Attends Oriental orthodox service: Not on file     Active member of club or organization: Not on file     Attends meetings of clubs or organizations: Not on file     Relationship status: Not on file    Intimate partner violence     Fear of current or ex partner: Not on file     Emotionally abused: Not on file     Physically abused: Not on file     Forced sexual activity: Not on file   Other Topics Concern    Not on file   Social History Narrative    Not on file       Family History:   History reviewed. No pertinent family history. REVIEW OF SYSTEMS:    Gen: Patient denies any lightheadedness or dizziness. No LOC or syncope. + fevers or chills. HEENT: No earache, sore throat or nasal congestion. Resp: + cough, hemoptysis or sputum production. Cardiac: Denies chest pain, SOB, diaphoresis or palpitations. GI: No nausea, vomiting, diarrhea or constipation. No melena or hematochezia. : No urinary complaints, dysuria, hematuria or frequency. MSK: No extremity weakness, paralysis or paresthesias. PHYSICAL EXAM:    Vitals:  BP (!) 110/55   Pulse 89   Temp 97.5 °F (36.4 °C) (Axillary)   Resp 17   Ht 5' 4\" (1.626 m)   Wt 121 lb (54.9 kg)   SpO2 91%   BMI 20.77 kg/m²     General:  This is a [de-identified] y.o. yo male who is alert and oriented X 1-2 in NAD  HEENT:  Head is normocephalic and atraumatic, PERRLA, EOMI, mucus membranes dry with no pharyngeal erythema or exudate. Neck:  Supple with no carotid bruits, JVD or thyromegaly.   No cervical adenopathy  CV:  Regular rate and rhythm, no murmurs  Lungs: Coarse breath sounds with scattered rales at right lung rater than left to auscultation with mild right-sided rhonchi  Abdomen:  Soft, nontender, nondistended, bowel sounds present  Extremities:  No edema, peripheral pulses intact bilaterally  Neuro:  Cranial nerves II-XII grossly intact; motor and sensory function intact with no focal deficits  Skin:  No rashes, lesions or wounds    DATA:  CBC with Differential:    Lab Results   Component Value Date    WBC 20.2 05/24/2020    RBC 3.26 05/24/2020    HGB 9.3 05/24/2020    HCT 28.7 05/24/2020     05/24/2020    MCV 88.0 05/24/2020    MCH 28.5 05/24/2020    MCHC 32.4 05/24/2020    RDW 14.7 05/24/2020    NRBC 0.9 03/16/2019    METASPCT 0.9 05/21/2020    LYMPHOPCT 5.3 05/24/2020    MONOPCT 7.1 05/24/2020    MYELOPCT 1.7 03/18/2019    BASOPCT 0.3 05/24/2020    MONOSABS 1.41 05/24/2020    LYMPHSABS 1.01 05/24/2020 malnutrition ([facility] [address] 03/14/2019    Weakness 03/13/2019    COPD (chronic obstructive pulmonary disease) (HCC)     Enlarged prostate  Chronic leukocytosis      Plan:  Nursing Home meds reviewed  Admit to telemetry floor  CT the head without contrast now  Carotid ultrasound now  Would like to do an MRI of the brain but patient most likely would be noncompliant with significant movement    CT the chest without contrast-no significant change in appearance with findings most likely related to chronic interstitial lung disease  Aspirin daily  Echocardiogram -EF 64% with stage I diastolic dysfunction, +1 MR, +2 AI, +2 TR, pulmonary hypertension at 52 mmHg, no evidence of thrombus or mass  Procalcitonin 5.02  Vancomycin IV piggyback  Cefepime 1 g IV piggyback every 12 hours  Routine labs in a.m.   IV fluids 1/2-normal saline with 20 KCl 100 cc an hour  Pending blood cultures    Alayna Chen D.O.  5/24/2020  8:03 AM

## 2020-05-24 NOTE — CONSULTS
ferrous sulfate (IRON 325) tablet 325 mg, 325 mg, Oral, Daily with breakfast, Bala Samuel, DO, 325 mg at 05/22/20 1005    pantoprazole (PROTONIX) tablet 40 mg, 40 mg, Oral, QAM AC, Ramakrishna DARRON Gomez, DO, 40 mg at 05/23/20 0516    acetaminophen (TYLENOL) tablet 500 mg, 500 mg, Oral, Q6H PRN, Bala Samuel, DO    ondansetron (ZOFRAN) injection 4 mg, 4 mg, Intravenous, Q6H PRN, Bala Jimmie, DO    magnesium hydroxide (MILK OF MAGNESIA) 400 MG/5ML suspension 30 mL, 30 mL, Oral, Daily PRN, Bala Samuel DO    0.45 % NaCl with KCl 20 mEq infusion, , Intravenous, Continuous, Bala Samuel DO, Last Rate: 75 mL/hr at 05/23/20 1859    cefepime (MAXIPIME) 1 g IVPB extended (mini-bag), 1 g, Intravenous, Q12H, Last Rate: 12.5 mL/hr at 05/24/20 0530, 1 g at 05/24/20 0530 **AND** 0.9 % sodium chloride infusion, , Intravenous, Q12H, Bala Samuel DO, Stopped at 05/23/20 2325    vancomycin (VANCOCIN) 750 mg in dextrose 5 % 250 mL IVPB, 750 mg, Intravenous, Q24H, Bala Samuel DO, Stopped at 05/23/20 2008  ALLERGIES     Patient has no known allergies. There is no immunization history on file for this patient. 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 No pertinent family history.   SOCIAL HISTORY       Social History     Socioeconomic History    Marital status:      Spouse name: None    Number of children: None    Years of education: None    Highest education visualized paranasal sinuses and mastoid air cells demonstrate mild mucosal thickening of the bilateral maxillary sinuses compatible with chronic sinus disease. SOFT TISSUES/SKULL:  No acute abnormality of the visualized skull or soft tissues. 1. Large area of hypoattenuation involving the right occipital, parietal, temporal, and frontal lobes which is new compared with CT head from May 14, 2020 and is most consistent with infarction. MRI brain could be obtained for further evaluation. 2. Chronic microvascular ischemic changes and global cerebral atrophy. The findings were sent to the Radiology Results [address] at 5:04 pm on 5/23/2020to be communicated to a licensed caregiver. Ct Head Wo Contrast    Result Date: 5/14/2020  EXAMINATION: CT OF THE HEAD WITHOUT CONTRAST  5/14/2020 5:06 pm TECHNIQUE: CT of the head was performed without the administration of intravenous contrast. Dose modulation, iterative reconstruction, and/or weight based adjustment of the mA/kV was utilized to reduce the radiation dose to as low as reasonably achievable. COMPARISON: CT scan of the head 03/13/2019 HISTORY: ORDERING SYSTEM PROVIDED HISTORY: altered mental status TECHNOLOGIST PROVIDED HISTORY: Reason for exam:->altered mental status Has a \"code stroke\" or \"stroke alert\" been called? ->No FINDINGS: BRAIN/VENTRICLES: There is no acute intracranial hemorrhage, mass effect or midline shift. No abnormal extra-axial fluid collection. The gray-white differentiation is maintained without evidence of an acute infarct. There is no evidence of hydrocephalus. Periventricular and subcortical white matter hypodensities are unchanged. Right basal ganglia lacunar infarcts. ORBITS: The visualized portion of the orbits demonstrate no acute abnormality. SINUSES: Partial opacification of the right maxillary sinus. SOFT TISSUES/SKULL:  No acute abnormality of the visualized skull or soft tissues.      1.  No acute intracranial abnormality. 2.  Partial opacification of the right maxillary sinus may be due to paranasal sinus inflammation. Ct Chest Wo Contrast    Result Date: 5/21/2020  EXAMINATION: CT OF THE CHEST WITHOUT CONTRAST 5/21/2020 6:18 pm TECHNIQUE: CT of the chest was performed without the administration of intravenous contrast. Multiplanar reformatted images are provided for review. Dose modulation, iterative reconstruction, and/or weight based adjustment of the mA/kV was utilized to reduce the radiation dose to as low as reasonably achievable. COMPARISON: March 14, 2019 HISTORY: ORDERING SYSTEM PROVIDED HISTORY: hypoxia TECHNOLOGIST PROVIDED HISTORY: Reason for exam:->hypoxia FINDINGS: Mediastinum: Thyroid gland is unremarkable. No thoracic aortic aneurysm. No pericardial effusion. Coronary artery calcifications. No adenopathy. Lungs/pleura: Evaluation is limited by motion. No pneumothorax or pleural effusion. Again noted are chronic interstitial changes noted peripherally in the lungs with honeycombing in the lower lobes peripherally. Overall, findings are similar to previous exam.  No consolidation. Upper Abdomen: Unremarkable Soft Tissues/Bones: No acute bony abnormality. No significant change in appearance of the lungs with findings likely related to chronic interstitial lung disease. Xr Chest Portable    Result Date: 5/21/2020  EXAMINATION: ONE XRAY VIEW OF THE CHEST 5/21/2020 1:24 pm COMPARISON: 05/14/2020. HISTORY: ORDERING SYSTEM PROVIDED HISTORY: sob TECHNOLOGIST PROVIDED HISTORY: Reason for exam:->sob FINDINGS: The cardiomediastinal silhouette is unremarkable. Stable increase in the interstitial markings throughout the lungs. No acute infiltrate, pleural fluid or evidence of overt failure. Aortic vascular calcification. No acute cardiopulmonary disease. Stable interstitial changes throughout the lungs.      Xr Chest Portable    Result Date: 5/14/2020  EXAMINATION: ONE XRAY VIEW OF THE CHEST  Pneumonia     was recently discharged from hospital last week        FINAL IMPRESSION      Sepsis  Fevers  Leukocytosis  PROCAL 5.02  CVA Large area of hypoattenuation involving the right occipital, parietal,  temporal, and frontal lobes  karen POA R/O UTI   DERMATITIS        cefepime (MAXIPIME) 1 g IVPB extended (mini-bag), Q12H     vancomycin (VANCOCIN) 750 mg in dextrose 5 % 250 mL IVPB, Q24H      CONT ATBX NO CHANGE   ADD ANTIFUNGAL   AWAIT CX  CHECK STOOLS FOR CDIFF IF HAVING DIARREHA    Orders Placed This Encounter   Procedures    SPECIALTY BED REQUEST    Culture, Urine    Culture, Blood 1    Culture, Blood 2    Culture, Blood 1    Culture, Blood 2    XR CHEST PORTABLE    CT Chest WO Contrast    US CAROTID ARTERY BILATERAL    CT Head WO Contrast    CBC Auto Differential    Comprehensive Metabolic Panel w/ Reflex to MG    Troponin    Urinalysis, reflex to microscopic    Lactate, Sepsis    COVID-19    Microscopic Urinalysis    Magnesium    Comprehensive Metabolic Panel    CBC Auto Differential    Magnesium    Phosphorus    C-Reactive Protein    Procalcitonin    SPECIMEN REJECTION    Sedimentation Rate    CBC Auto Differential    Basic Metabolic Panel    Basic Metabolic Panel    CBC Auto Differential    Diet NPO Effective Now    Activity as tolerated    Telemetry Monitoring    Elevate heels off of bed at all times if patient is not able to move lower extremities    Turn or assist with turn every 2 hours if patient is unable to turn self. Remind patient to turn if necessary.     Inspect skin per unit guidelines    Maintain HOB at the lowest elevation consistent with medical plan of care    Full code    Inpatient consult to Internal Medicine   [address] [place] [address] Avenue to Dose: Vancomycin    Inpatient consult to Neurology    Inpatient consult to Infectious Diseases    OT eval and treat    PT eval and treat    Pulse oximetry, continuous    Initiate Oxygen Therapy Protocol    Nasal

## 2020-05-24 NOTE — PROGRESS NOTES
Speech Language Pathology      NAME:  Jose Carlos Sim  :  1940  DATE: 2020  ROOM:  0617/0617-01    Attempted to complete swallow eval in PM. Per nursing/radiology, on hold due to Hospice Consult. Sepsis ([facility] [A41.9]        Rachana LAGUNAS CCC/SLP [id number]  Speech-Language Pathologist

## 2020-05-24 NOTE — CONSULTS
Palliative medicine    Chart reviewed. Patient is a DNR-CC and hospice has been consulted. Hospice is support at this time would provide the family with the most support. I will defer completion of consult until family is able to speak with hospice and if they decide to pursue hospice services then there are no specific palliative medicine needs likely. Please call in the interim if there are additional needs or concerns.   Kody Katz PA-C

## 2020-05-24 NOTE — CONSULTS
has the following symptoms:    Change in level of consciousness: alert    New Weakness: yes    Numbness or Tingling: no    Difficulty Swallowing: yes    Current Medications:   Scheduled Meds:   amLODIPine  5 mg Oral Daily    heparin (porcine)  5,000 Units Subcutaneous BID    aspirin  81 mg Oral Daily    mirtazapine  7.5 mg Oral Nightly    metoprolol  2.5 mg Intravenous Q8H    sodium chloride flush  10 mL Intravenous 2 times per day    ferrous sulfate  325 mg Oral Daily with breakfast    pantoprazole  40 mg Oral QAM AC    cefepime  1 g Intravenous Q12H    vancomycin  750 mg Intravenous Q24H     Continuous Infusions:   0.45 % NaCl with KCl 20 mEq 75 mL/hr at 05/23/20 1859    sodium chloride Stopped (05/23/20 2325)     PRN Meds:hydrALAZINE, acetaminophen, sodium chloride flush, acetaminophen, ondansetron, magnesium hydroxide    Allergies:  Patient has no known allergies. Social History:   TOBACCO:   reports that he has been smoking cigarettes. He has been smoking about 2.00 packs per day. He has never used smokeless tobacco.  ETOH:   reports no history of alcohol use. Past Medical History:        Diagnosis Date    [facility]     COPD (chronic obstructive pulmonary disease) (H[facility])     Enlarged prostate     Hyperlipidemia        Past Surgical History:        Procedure Laterality Date    UPPER GASTROINTESTINAL ENDOSCOPY N/A 3/21/2019    EGD BIOPSY performed by Jignesh Crowley MD at [facility]  3/21/2019    EGD DILATION BALLOON performed by Jignesh Crowley MD at [place] [address]         Outside reports reviewed: ER records, historical medical records, lab reports and radiology reports. Patient's medications, allergies, past medical, surgical, social and family histories were reviewed and updated as appropriate.     Review of Systems  A comprehensive review of systems was negative except for:       Objective:     Neuro exam 110/54 p 88 t 98  General: lethargic. Cranial nerve testing  unable to cooperate. PERRL, corneal reflexes +  . Funduscopic eye exam revealed not testable. Motor exam: . Harvis Pock Deep tendon reflexes were absent bilaterally. Plantar responses were left side extensor; right side flexor. Cerebellar exam noted . Harvis Pock Sensation was . 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 Son to discuss with other siblings large stroke and issues such as carotid intervention/surgical evaluation, feeding tube and code status; possible need for palliative care and or hospice.   Family to visit to decide code status/potentially end of life issues   I'm available via perfect serve for any further input  Thank you for consultation

## 2020-05-24 NOTE — PLAN OF CARE
Problem: Falls - Risk of:  Goal: Will remain free from falls  Description: Will remain free from falls  5/24/2020 1405 by Jose Araiza RN  Outcome: Met This Shift     Problem: Falls - Risk of:  Goal: Absence of physical injury  Description: Absence of physical injury  5/24/2020 1405 by Jose Araiza RN  Outcome: Met This Shift     Problem: Discharge Planning:  Goal: Discharged to appropriate level of care  Description: Discharged to appropriate level of care  5/24/2020 1405 by Jose Araiza RN  Outcome: Met This Shift  Note: Pt going with discharge      Problem: Gas Exchange - Impaired:  Goal: Levels of oxygenation will improve  Description: Levels of oxygenation will improve  5/24/2020 1405 by Jose Araiza RN  Outcome: Met This Shift     Problem: Infection, Septic Shock:  Goal: Will show no infection signs and symptoms  Description: Will show no infection signs and symptoms  5/24/2020 1405 by Jose Araiza RN  Outcome: Met This Shift

## 2020-05-25 PROCEDURE — 2500000003 HC RX 250 WO HCPCS: Performed by: INTERNAL MEDICINE

## 2020-05-25 PROCEDURE — 6370000000 HC RX 637 (ALT 250 FOR IP): Performed by: INTERNAL MEDICINE

## 2020-05-25 PROCEDURE — 94761 N-INVAS EAR/PLS OXIMETRY MLT: CPT

## 2020-05-25 PROCEDURE — 6360000002 HC RX W HCPCS

## 2020-05-25 PROCEDURE — 2580000003 HC RX 258: Performed by: INTERNAL MEDICINE

## 2020-05-25 PROCEDURE — 2700000000 HC OXYGEN THERAPY PER DAY

## 2020-05-25 PROCEDURE — 2580000003 HC RX 258

## 2020-05-25 PROCEDURE — 2060000000 HC ICU INTERMEDIATE R&B

## 2020-05-25 PROCEDURE — 6360000002 HC RX W HCPCS: Performed by: SPECIALIST

## 2020-05-25 PROCEDURE — 6360000002 HC RX W HCPCS: Performed by: INTERNAL MEDICINE

## 2020-05-25 PROCEDURE — C9113 INJ PANTOPRAZOLE SODIUM, VIA: HCPCS | Performed by: INTERNAL MEDICINE

## 2020-05-25 PROCEDURE — 99222 1ST HOSP IP/OBS MODERATE 55: CPT | Performed by: NURSE PRACTITIONER

## 2020-05-25 RX ORDER — DEXTROSE, SODIUM CHLORIDE, AND POTASSIUM CHLORIDE 5; .9; .15 G/100ML; G/100ML; G/100ML
INJECTION INTRAVENOUS CONTINUOUS
Status: DISCONTINUED | OUTPATIENT
Start: 2020-05-25 | End: 2020-05-27

## 2020-05-25 RX ADMIN — CEFEPIME 1 G: 1 INJECTION, POWDER, FOR SOLUTION INTRAMUSCULAR; INTRAVENOUS at 18:36

## 2020-05-25 RX ADMIN — CEFEPIME 1 G: 1 INJECTION, POWDER, FOR SOLUTION INTRAMUSCULAR; INTRAVENOUS at 07:00

## 2020-05-25 RX ADMIN — HEPARIN SODIUM 5000 UNITS: 5000 INJECTION, SOLUTION INTRAVENOUS; SUBCUTANEOUS at 09:12

## 2020-05-25 RX ADMIN — POTASSIUM CHLORIDE, DEXTROSE MONOHYDRATE AND SODIUM CHLORIDE: 150; 5; 900 INJECTION, SOLUTION INTRAVENOUS at 11:53

## 2020-05-25 RX ADMIN — MORPHINE SULFATE 2 MG: 2 INJECTION, SOLUTION INTRAMUSCULAR; INTRAVENOUS at 05:26

## 2020-05-25 RX ADMIN — POTASSIUM CHLORIDE AND SODIUM CHLORIDE: 450; 150 INJECTION, SOLUTION INTRAVENOUS at 00:24

## 2020-05-25 RX ADMIN — FLUCONAZOLE 400 MG: 2 INJECTION, SOLUTION INTRAVENOUS at 11:53

## 2020-05-25 RX ADMIN — HEPARIN SODIUM 5000 UNITS: 5000 INJECTION, SOLUTION INTRAVENOUS; SUBCUTANEOUS at 20:01

## 2020-05-25 RX ADMIN — SODIUM CHLORIDE: 9 INJECTION, SOLUTION INTRAVENOUS at 22:03

## 2020-05-25 RX ADMIN — SODIUM CHLORIDE: 9 INJECTION, SOLUTION INTRAVENOUS at 09:30

## 2020-05-25 RX ADMIN — METOPROLOL TARTRATE 2.5 MG: 5 INJECTION INTRAVENOUS at 11:53

## 2020-05-25 RX ADMIN — METOPROLOL TARTRATE 2.5 MG: 5 INJECTION INTRAVENOUS at 20:00

## 2020-05-25 RX ADMIN — ASPIRIN 150 MG: 300 SUPPOSITORY RECTAL at 09:12

## 2020-05-25 RX ADMIN — PANTOPRAZOLE SODIUM 40 MG: 40 INJECTION, POWDER, FOR SOLUTION INTRAVENOUS at 09:12

## 2020-05-25 RX ADMIN — METOPROLOL TARTRATE 2.5 MG: 5 INJECTION INTRAVENOUS at 05:20

## 2020-05-25 RX ADMIN — Medication 10 ML: at 20:01

## 2020-05-25 RX ADMIN — VANCOMYCIN HYDROCHLORIDE 1000 MG: 1 INJECTION, POWDER, LYOPHILIZED, FOR SOLUTION INTRAVENOUS at 18:36

## 2020-05-25 ASSESSMENT — PAIN SCALES - GENERAL
PAINLEVEL_OUTOF10: 0
PAINLEVEL_OUTOF10: 10

## 2020-05-25 ASSESSMENT — PAIN DESCRIPTION - LOCATION: LOCATION: GENERALIZED

## 2020-05-25 ASSESSMENT — PAIN DESCRIPTION - PAIN TYPE: TYPE: ACUTE PAIN

## 2020-05-25 ASSESSMENT — PAIN DESCRIPTION - DESCRIPTORS: DESCRIPTORS: PATIENT UNABLE TO DESCRIBE

## 2020-05-25 NOTE — PROGRESS NOTES
Follow up visit to patients room, patient was sleeping. I placed call to son Patsy Vargas states that patient has been more alert and they are still waiting on the swallow evaluation. Clara Yee brought up about placing a feeding tube. I asked if that is what they wanted to do and Clara Yee states they may consider depending on what swallow evaluation shows as they do not want patient to Lina Gins to death. \"  Clara Yee states at this time they do not want to transition to hospice care. I explained that I would leave referral open for while patient is in the hospital and if the family does decide to transition to hospice prior to discharge, staff at the hospital can notify [facility]. I also explained to Clara Yee that if patient discharges from the hospital and later they wish to transition to hospice care, they can call [facility] directly. I asked if Clara Yee would want patient to attempt rehab again and Clara Yee states yes if patient is able to. Raymond Laboy know that I would update the nursing staff.

## 2020-05-25 NOTE — PROGRESS NOTES
[address] [place] [address] [facility]  Progress Note    NAME:Lalito Stephens  1940  DATE:20    Pt was seen FACE TO FACE FOR     SUBJECTIVE:  Chief Complaint   Patient presents with    Shortness of Breath    Pneumonia     was recently discharged from hospital last week       Patient is tolerating medications. No reported adverse drug reactions. Review of systems:  As stated above in the chief complaint, otherwise negative. Medications:  Scheduled Meds:   aspirin  150 mg Rectal Daily    pantoprazole  40 mg Intravenous Daily    fluconazole  400 mg Intravenous Q24H    vancomycin  1,000 mg Intravenous Q24H    amLODIPine  5 mg Oral Daily    heparin (porcine)  5,000 Units Subcutaneous BID    mirtazapine  7.5 mg Oral Nightly    metoprolol  2.5 mg Intravenous Q8H    sodium chloride flush  10 mL Intravenous 2 times per day    ferrous sulfate  325 mg Oral Daily with breakfast    cefepime  1 g Intravenous Q12H     Continuous Infusions:   0.45 % NaCl with KCl 20 mEq 75 mL/hr at 20 0647    sodium chloride Stopped (20 1042)     PRN Meds:morphine, hydrALAZINE, acetaminophen, sodium chloride flush, acetaminophen, ondansetron, magnesium hydroxide    OBJECTIVE:  /73   Pulse 90   Temp 98.1 °F (36.7 °C)   Resp 19   Ht 5' 4\" (1.626 m)   Wt 121 lb (54.9 kg)   SpO2 92%   BMI 20.77 kg/m²   Temp  Av.4 °F (36.9 °C)  Min: 98.1 °F (36.7 °C)  Max: 98.6 °F (37 °C)  Constitutional:  The patient is awake, alert, and oriented. Skin:    Warm and dry. HEENT:      AT/NC  Neck:    Supple to movements. Chest:   No use of accessory muscles to breathe. Symmetrical expansion. Few rales  Cardiovascular:  S1 and S2 are rhythmic and regular. No murmurs appreciated. Abdomen:   Positive bowel sounds to auscultation. Benign to palpation. Extremities:   No clubbing, no cyanosis, no edema.  weaknes   CNS    AAxO  Follow commands  Lines: piv    Radiology:  Laboratory and Tests

## 2020-05-25 NOTE — PROGRESS NOTES
Department of Internal Medicine        CHIEF COMPLAINT: Shortness of breath, fever/chills    Reason for Admission: Sepsis    HISTORY OF PRESENT ILLNESS:      The patient is a [de-identified] y.o. male who presents with recently been discharged from the hospital with sepsis unknown etiology. The patient was sent to the nursing home on [facility] twice daily with all cultures being negative. Patient developed acute onset fever and chills along with increased shortness of breath at the nursing home. Facility had a temperature 102 degrees. In the ED the patient temperature was 100. The patient was mildly hypoxic. Patient is very weak but is alert and oriented person. Patient mouth is extremely dry. Patient is moving all extremities. He denies any chest or abdominal pain. Patient has a history of COPD. WBC was increased 28.5 and discharge is 17,000. BUN/creatinine was about the same as before with 26/1.8. Lactic acid 3.3. Urinalysis and chest x-ray are negative. 5/22/2020  Patient was seen and examined on telemetry floor. Patient is a little bit weaker today. Nursing stated the patient was much more alert earlier today. Patient has some coarse upper airway breath sounds. BUN/creatinine 20/1.3 today with magnesium is 1.6. WBC is 20,000. Hemoglobin 0.3. Pending urine and blood cultures. Pending echocardiogram.  Temperature 98.6 with a heart rate of 74. Blood pressure 131//79. O2 sats 95% on 3 L.    5/23/2020  Patient was seen and examined on for floor telemetry. Patient is more lethargic again today. Nursing states patient is unable to swallow his food. When he tried to turn his head the patient will knock away her hand with his left arm. BUN/creatinine was 17/1.3. Hemoglobin is 9.1. WBC is 18.4. Temperature 98.5 and heart rate 88. Blood pressure currently 114/59. O2 sats 94% on 3 L.    5/24/2020  Patient was seen and examined on [address]   Patient had acute large CVA involving the right occipital, Medication Sig Start Date End Date Taking? Authorizing Provider   doxycycline hyclate (VIBRAMYCIN) 100 MG capsule Take 100 mg by mouth 2 times daily 5/20/20 5/28/20 Yes Historical Provider, MD   guaiFENesin (MUCINEX) 600 MG extended release tablet Take 600 mg by mouth 2 times daily 5/20/20 5/27/20 Yes Historical Provider, MD   acetaminophen (TYLENOL) 325 MG tablet Take 650 mg by mouth every 6 hours as needed for Pain   Yes Historical Provider, MD   mirtazapine (REMERON) 15 MG tablet Take 15 mg by mouth nightly   Yes Historical Provider, MD   omeprazole (PRILOSEC) 20 MG delayed release capsule Take 20 mg by mouth daily   Yes Historical Provider, MD   atenolol (TENORMIN) 25 MG tablet Take 1.5 tablets by mouth daily 3/24/19  Yes Fidel Schwartz DO   amLODIPine (NORVASC) 10 MG tablet Take 1 tablet by mouth daily 3/24/19  Yes Fidel Schwartz DO   ferrous sulfate 325 (65 Fe) MG tablet Take 1 tablet by mouth daily (with breakfast) 3/23/19  Yes Fidel Schwartz DO       Allergies:  Patient has no known allergies.     Social History:   Social History     Socioeconomic History    Marital status:      Spouse name: Not on file    Number of children: Not on file    Years of education: Not on file    Highest education level: Not on file   Occupational History    Not on file   Social Needs    Financial resource strain: Not on file    Food insecurity     Worry: Not on file     Inability: Not on file   [employer] needs     Medical: Not on file     Non-medical: Not on file   Tobacco Use    Smoking status: Current Every Day Smoker     Packs/day: 2.00     Types: Cigarettes    Smokeless tobacco: Never Used    Tobacco comment: chain smoker per patient's son   Substance and Sexual Activity    Alcohol use: Never     Frequency: Never    Drug use: Never    Sexual activity: Not on file   Lifestyle    Physical activity     Days per week: Not on file     Minutes per session: Not on file    Stress: Not on file Relationships    Social connections     Talks on phone: Not on file     Gets together: Not on file     Attends [employer] service: Not on file     Active member of club or organization: Not on file     Attends meetings of clubs or organizations: Not on file     Relationship status: Not on file    Intimate partner violence     Fear of current or ex partner: Not on file     Emotionally abused: Not on file     Physically abused: Not on file     Forced sexual activity: Not on file   Other Topics Concern    Not on file   Social History Narrative    Not on file       Family History:   History reviewed. No pertinent family history. REVIEW OF SYSTEMS:    Gen: Patient denies any lightheadedness or dizziness. No LOC or syncope. + fevers or chills. HEENT: No earache, sore throat or nasal congestion. Resp: + cough, hemoptysis or sputum production. Cardiac: Denies chest pain, SOB, diaphoresis or palpitations. GI: No nausea, vomiting, diarrhea or constipation. No melena or hematochezia. : No urinary complaints, dysuria, hematuria or frequency. MSK: No extremity weakness, paralysis or paresthesias. PHYSICAL EXAM:    Vitals:  /73   Pulse 90   Temp 98.1 °F (36.7 °C)   Resp 19   Ht 5' 4\" (1.626 m)   Wt 121 lb (54.9 kg)   SpO2 92%   BMI 20.77 kg/m²     General:  This is a [de-identified] y.o. yo male who is alert and oriented X 1-2 in NAD  HEENT:  Head is normocephalic and atraumatic, PERRLA, EOMI, mucus membranes dry with no pharyngeal erythema or exudate. Neck:  Supple with no carotid bruits, JVD or thyromegaly.   No cervical adenopathy  CV:  Regular rate and rhythm, no murmurs  Lungs: Coarse breath sounds with scattered rales at right lung rater than left to auscultation with mild right-sided rhonchi  Abdomen:  Soft, nontender, nondistended, bowel sounds present  Extremities:  No edema, peripheral pulses intact bilaterally  Neuro:  Cranial nerves II-XII grossly intact; motor and sensory function intact with no focal deficits  Skin:  No rashes, lesions or wounds    DATA:  CBC with Differential:    Lab Results   Component Value Date    WBC 20.2 05/24/2020    RBC 3.26 05/24/2020    HGB 9.3 05/24/2020    HCT 28.7 05/24/2020     05/24/2020    MCV 88.0 05/24/2020    MCH 28.5 05/24/2020    MCHC 32.4 05/24/2020    RDW 14.7 05/24/2020    NRBC 0.9 03/16/2019    METASPCT 0.9 05/21/2020    LYMPHOPCT 5.3 05/24/2020    MONOPCT 7.1 05/24/2020    MYELOPCT 1.7 03/18/2019    BASOPCT 0.3 05/24/2020    MONOSABS 1.41 05/24/2020    LYMPHSABS 1.01 05/24/2020    EOSABS 0.36 05/24/2020    BASOSABS 0.00 05/24/2020     CMP:    Lab Results   Component Value Date     05/24/2020    K 4.1 05/24/2020    K 3.2 05/21/2020    CL 99 05/24/2020    CO2 20 05/24/2020    BUN 14 05/24/2020    CREATININE 1.1 05/24/2020    GFRAA >60 05/24/2020    LABGLOM >60 05/24/2020    GLUCOSE 101 05/24/2020    PROT 7.1 05/22/2020    LABALBU 2.4 05/22/2020    CALCIUM 8.5 05/24/2020    BILITOT 0.3 05/22/2020    ALKPHOS 71 05/22/2020    AST 29 05/22/2020    ALT 15 05/22/2020     Magnesium:    Lab Results   Component Value Date    MG 1.6 05/22/2020     Phosphorus:    Lab Results   Component Value Date    PHOS 2.0 05/22/2020     PT/INR:    Lab Results   Component Value Date    PROTIME 13.8 03/20/2019    INR 1.2 03/20/2019     Troponin:    Lab Results   Component Value Date    TROPONINI 0.01 05/21/2020     U/A:    Lab Results   Component Value Date    COLORU Yellow 05/21/2020    PROTEINU 100 05/21/2020    PHUR 6.0 05/21/2020    WBCUA 0-1 05/21/2020    RBCUA NONE 05/21/2020    BACTERIA RARE 05/21/2020    CLARITYU Clear 05/21/2020    SPECGRAV 1.025 05/21/2020    LEUKOCYTESUR Negative 05/21/2020    UROBILINOGEN 0.2 05/21/2020    BILIRUBINUR Negative 05/21/2020    BLOODU SMALL 05/21/2020    GLUCOSEU Negative 05/21/2020     ABG:  No results found for: PH, PCO2, PO2, HCO3, BE, THGB, TCO2, O2SAT  HgBA1c:  No results found for: LABA1C  FLP:    Lab Results Component Value Date    TRIG 73 05/15/2020    HDL 40 05/15/2020    LDLCALC 63 05/15/2020    LABVLDL 15 05/15/2020     TSH:    Lab Results   Component Value Date    TSH 0.555 05/15/2020     IRON:    Lab Results   Component Value Date    IRON 32 03/20/2019     LIPASE:    Lab Results   Component Value Date    LIPASE 62 05/14/2020       ASSESSMENT AND PLAN:      Patient Active Problem List    Diagnosis Date Noted    Sepsis ([facility] 05/14/2020     Acute hypoxic respiratory failure  Acute large right hemispheric CVA   03/14/2019    Severe protein-calorie malnutrition ([facility] [address] [facility] 03/14/2019    Weakness 03/13/2019    COPD (chronic obstructive pulmonary disease) (HCC)     Enlarged prostate  Chronic leukocytosis      Plan:  Nursing Home meds reviewed  Admit to telemetry floor  CT the head without contrast now  Carotid ultrasound now  Would like to do an MRI of the brain but patient most likely would be noncompliant with significant movement    CT the chest without contrast-no significant change in appearance with findings most likely related to chronic interstitial lung disease  Family is considering PEG tube. Aspirin daily  Echocardiogram -EF 64% with stage I diastolic dysfunction, +1 MR, +2 AI, +2 TR, pulmonary hypertension at 52 mmHg, no evidence of thrombus or mass  Procalcitonin 5.02  Vancomycin IV piggyback  Cefepime 1 g IV piggyback every 12 hours  Routine labs in a.m.   IV fluids 1/2-normal saline with 20 KCl 100 cc an hour  Pending blood cultures    Santino Mistry D.O.  5/25/2020  11:13 AM

## 2020-05-25 NOTE — PLAN OF CARE
Problem: Falls - Risk of:  Goal: Will remain free from falls  Description: Will remain free from falls  Outcome: Met This Shift  Goal: Absence of physical injury  Description: Absence of physical injury  Outcome: Met This Shift     Problem: Infection, Septic Shock:  Goal: Will show no infection signs and symptoms  Description: Will show no infection signs and symptoms  Outcome: Met This Shift     Problem: Gas Exchange - Impaired:  Goal: Levels of oxygenation will improve  Description: Levels of oxygenation will improve  Outcome: Ongoing     Problem: Discharge Planning:  Goal: Discharged to appropriate level of care  Description: Discharged to appropriate level of care  Outcome: Not Met This Shift

## 2020-05-25 NOTE — PROGRESS NOTES
Pt Hemant Avelar would like pt care switched to Dr. Liliana Richard, charge nurse Vivien Snowden made aware.

## 2020-05-26 ENCOUNTER — APPOINTMENT (OUTPATIENT)
Dept: GENERAL RADIOLOGY | Age: 80
DRG: 871 | End: 2020-05-26
Payer: MEDICARE

## 2020-05-26 LAB
ANION GAP SERPL CALCULATED.3IONS-SCNC: 13 MMOL/L (ref 7–16)
BASOPHILS ABSOLUTE: 0.05 E9/L (ref 0–0.2)
BASOPHILS RELATIVE PERCENT: 0.3 % (ref 0–2)
BLOOD CULTURE, ROUTINE: NORMAL
BUN BLDV-MCNC: 13 MG/DL (ref 8–23)
C DIFF TOXIN/ANTIGEN: NORMAL
CALCIUM SERPL-MCNC: 8.4 MG/DL (ref 8.6–10.2)
CHLORIDE BLD-SCNC: 100 MMOL/L (ref 98–107)
CO2: 22 MMOL/L (ref 22–29)
CREAT SERPL-MCNC: 1.1 MG/DL (ref 0.7–1.2)
CULTURE, BLOOD 2: NORMAL
EOSINOPHILS ABSOLUTE: 0.27 E9/L (ref 0.05–0.5)
EOSINOPHILS RELATIVE PERCENT: 1.6 % (ref 0–6)
GFR AFRICAN AMERICAN: >60
GFR NON-AFRICAN AMERICAN: >60 ML/MIN/1.73
GLUCOSE BLD-MCNC: 143 MG/DL (ref 74–99)
HCT VFR BLD CALC: 27.1 % (ref 37–54)
HEMOGLOBIN: 9 G/DL (ref 12.5–16.5)
IMMATURE GRANULOCYTES #: 0.22 E9/L
IMMATURE GRANULOCYTES %: 1.3 % (ref 0–5)
LYMPHOCYTES ABSOLUTE: 1.47 E9/L (ref 1.5–4)
LYMPHOCYTES RELATIVE PERCENT: 8.6 % (ref 20–42)
MCH RBC QN AUTO: 28.8 PG (ref 26–35)
MCHC RBC AUTO-ENTMCNC: 33.2 % (ref 32–34.5)
MCV RBC AUTO: 86.9 FL (ref 80–99.9)
MONOCYTES ABSOLUTE: 1.42 E9/L (ref 0.1–0.95)
MONOCYTES RELATIVE PERCENT: 8.3 % (ref 2–12)
NEUTROPHILS ABSOLUTE: 13.6 E9/L (ref 1.8–7.3)
NEUTROPHILS RELATIVE PERCENT: 79.9 % (ref 43–80)
PDW BLD-RTO: 14.5 FL (ref 11.5–15)
PLATELET # BLD: 426 E9/L (ref 130–450)
PMV BLD AUTO: 10.7 FL (ref 7–12)
POTASSIUM SERPL-SCNC: 4.3 MMOL/L (ref 3.5–5)
RBC # BLD: 3.12 E12/L (ref 3.8–5.8)
SODIUM BLD-SCNC: 135 MMOL/L (ref 132–146)
WBC # BLD: 17 E9/L (ref 4.5–11.5)

## 2020-05-26 PROCEDURE — 6360000002 HC RX W HCPCS

## 2020-05-26 PROCEDURE — 2580000003 HC RX 258: Performed by: INTERNAL MEDICINE

## 2020-05-26 PROCEDURE — 2580000003 HC RX 258

## 2020-05-26 PROCEDURE — 74230 X-RAY XM SWLNG FUNCJ C+: CPT

## 2020-05-26 PROCEDURE — 6360000002 HC RX W HCPCS: Performed by: SPECIALIST

## 2020-05-26 PROCEDURE — 87449 NOS EACH ORGANISM AG IA: CPT

## 2020-05-26 PROCEDURE — 2700000000 HC OXYGEN THERAPY PER DAY

## 2020-05-26 PROCEDURE — 80048 BASIC METABOLIC PNL TOTAL CA: CPT

## 2020-05-26 PROCEDURE — C9113 INJ PANTOPRAZOLE SODIUM, VIA: HCPCS | Performed by: INTERNAL MEDICINE

## 2020-05-26 PROCEDURE — 2500000003 HC RX 250 WO HCPCS: Performed by: INTERNAL MEDICINE

## 2020-05-26 PROCEDURE — 36415 COLL VENOUS BLD VENIPUNCTURE: CPT

## 2020-05-26 PROCEDURE — 87324 CLOSTRIDIUM AG IA: CPT

## 2020-05-26 PROCEDURE — 6360000002 HC RX W HCPCS: Performed by: INTERNAL MEDICINE

## 2020-05-26 PROCEDURE — 2060000000 HC ICU INTERMEDIATE R&B

## 2020-05-26 PROCEDURE — 85025 COMPLETE CBC W/AUTO DIFF WBC: CPT

## 2020-05-26 PROCEDURE — 97530 THERAPEUTIC ACTIVITIES: CPT

## 2020-05-26 PROCEDURE — 6370000000 HC RX 637 (ALT 250 FOR IP): Performed by: INTERNAL MEDICINE

## 2020-05-26 RX ADMIN — METOPROLOL TARTRATE 2.5 MG: 5 INJECTION INTRAVENOUS at 14:57

## 2020-05-26 RX ADMIN — HEPARIN SODIUM 5000 UNITS: 5000 INJECTION, SOLUTION INTRAVENOUS; SUBCUTANEOUS at 22:11

## 2020-05-26 RX ADMIN — METOPROLOL TARTRATE 2.5 MG: 5 INJECTION INTRAVENOUS at 22:07

## 2020-05-26 RX ADMIN — FLUCONAZOLE 400 MG: 2 INJECTION, SOLUTION INTRAVENOUS at 14:57

## 2020-05-26 RX ADMIN — CEFEPIME 1 G: 1 INJECTION, POWDER, FOR SOLUTION INTRAMUSCULAR; INTRAVENOUS at 19:20

## 2020-05-26 RX ADMIN — ASPIRIN 150 MG: 300 SUPPOSITORY RECTAL at 09:49

## 2020-05-26 RX ADMIN — CEFEPIME 1 G: 1 INJECTION, POWDER, FOR SOLUTION INTRAMUSCULAR; INTRAVENOUS at 06:41

## 2020-05-26 RX ADMIN — SODIUM CHLORIDE: 9 INJECTION, SOLUTION INTRAVENOUS at 23:31

## 2020-05-26 RX ADMIN — HEPARIN SODIUM 5000 UNITS: 5000 INJECTION, SOLUTION INTRAVENOUS; SUBCUTANEOUS at 09:49

## 2020-05-26 RX ADMIN — SODIUM CHLORIDE: 9 INJECTION, SOLUTION INTRAVENOUS at 12:15

## 2020-05-26 RX ADMIN — VANCOMYCIN HYDROCHLORIDE 1000 MG: 1 INJECTION, POWDER, LYOPHILIZED, FOR SOLUTION INTRAVENOUS at 19:24

## 2020-05-26 RX ADMIN — MIRTAZAPINE 7.5 MG: 15 TABLET, FILM COATED ORAL at 22:07

## 2020-05-26 RX ADMIN — BARIUM SULFATE 45 ML: 400 SUSPENSION ORAL at 10:54

## 2020-05-26 RX ADMIN — POTASSIUM CHLORIDE, DEXTROSE MONOHYDRATE AND SODIUM CHLORIDE: 150; 5; 900 INJECTION, SOLUTION INTRAVENOUS at 01:04

## 2020-05-26 RX ADMIN — BARIUM SULFATE 45 G: 0.6 CREAM ORAL at 10:54

## 2020-05-26 RX ADMIN — Medication 10 ML: at 22:07

## 2020-05-26 RX ADMIN — METOPROLOL TARTRATE 2.5 MG: 5 INJECTION INTRAVENOUS at 04:58

## 2020-05-26 RX ADMIN — POTASSIUM CHLORIDE, DEXTROSE MONOHYDRATE AND SODIUM CHLORIDE: 150; 5; 900 INJECTION, SOLUTION INTRAVENOUS at 14:56

## 2020-05-26 RX ADMIN — BARIUM SULFATE 45 G: 0.81 POWDER, FOR SUSPENSION ORAL at 10:55

## 2020-05-26 RX ADMIN — PANTOPRAZOLE SODIUM 40 MG: 40 INJECTION, POWDER, FOR SOLUTION INTRAVENOUS at 09:49

## 2020-05-26 RX ADMIN — Medication 10 ML: at 09:49

## 2020-05-26 ASSESSMENT — PAIN SCALES - GENERAL: PAINLEVEL_OUTOF10: 0

## 2020-05-26 NOTE — PROGRESS NOTES
solid not appropriate. Bolus transport/lingual motion was with slow/repetitive tongue motion. Oral residue was not observed. There was complete oral clearance. Initiation of the pharyngeal swallow occurred as the bolus head reached/filled the pyriform sinuses. Soft palate elevation resulted in no bolus between the soft palate and the pharyngeal wall. Laryngeal elevation demonstrated partial superior movement of the thyroid cartilage with partial approximation of the arytenoids to the epiglottic petiole. Anterior hyoid excursion demonstrated partial anterior movement. Epiglottic movement resulted in partial inversion. Laryngeal vestibule closure was incomplete, as indicated by a narrow column of air or contrast within the laryngeal vestibule at the height of the swallow. Pharyngeal stripping wave was present but diminished. Pharyngeal contraction could not be determined due to logistical reasons not related to physiologic impairment. Pharyngoesophageal segment opening was completely distended for complete duration with no obstruction of bolus flow. Tongue base retraction allowed nocollection of residue between the retracted tongue base and the posterior pharyngeal wall. Esophageal clearance in the upright position could not be assessed due to logistical reasons not related to physiologic impairment. Penetration but no aspiration  was present on large cup sip of nectar thick with no cough    No penetration or aspiration with tsp sip of nectar thick, tsp sip of honey thick and puree.      Current Respiratory Status   room air         COMPENSATORY STRATEGIES       Compensatory strategies that were beneficial included Small bites/sips and Alternate solids and liquids verbal cues to swallow       STRUCTURAL/FUNCTIONAL ANOMALIES       No structural/functional anomalies were noted    CERVICAL ESOPHAGEAL STAGE :        The cervical esophagus appeared adequate                                 The Speech Language Pathologist (SLP) completed education with the patient regarding results of evaluation. Explained that Speech Pathology intervention is warranted  at this time   Prognosis for improvements is fair +     This plan will be re-evaluated and revised in 1 week  if warranted. Patient stated goals: Could not state,   Treatment goals discussed with Patient   The Patient did not demonstrate understanding of the diagnosis, prognosis and plan of care     CPT code:  52824  dysphagia study    Evaluation time includes  review of current medical information, gathering information on past medical history/social history and prior level of function, completion of standardized testing/informal observation of tasks, assessment of data, and development of POC/Goals. [x]The admitting diagnosis and active problem list, as listed below have been reviewed prior to initiation of this evaluation.      ADMITTING DIAGNOSIS: Sepsis ([facility] [address] [A41.9]     ACTIVE PROBLEM LIST:   Patient Active Problem List   Diagnosis    Weakness    COPD (chronic obstructive pulmonary disease) ([address]    Enlarged prostate    Pneumonia    Severe protein-calorie malnutrition ([address]    Sepsis ([address]    Stroke due to occlusion of right middle cerebral artery ([address]       Diana Will MSCCC/SLP  Speech Language Pathologist  [id number]

## 2020-05-26 NOTE — CARE COORDINATION
Spoke with Rocael from the LTAC, located within [facility], gave him all the patients information. If more is needed, someone will contact the .  Electronically signed by Greg Monzon on 5/26/2020 at 11:09 AM

## 2020-05-26 NOTE — PROGRESS NOTES
[address] [place] [address] [facility]  Progress Note    NAME:Lalito Carolina  1940  DATE:20    Pt was seen FACE TO FACE FOR WBC    SUBJECTIVE:  Chief Complaint   Patient presents with    Shortness of Breath    Pneumonia     was recently discharged from hospital last week   [address]  [id number]  Patient is tolerating medications. No reported adverse drug reactions. Review of systems:  As stated above in the chief complaint, otherwise negative. Medications:  Scheduled Meds:   aspirin  150 mg Rectal Daily    pantoprazole  40 mg Intravenous Daily    fluconazole  400 mg Intravenous Q24H    vancomycin  1,000 mg Intravenous Q24H    amLODIPine  5 mg Oral Daily    heparin (porcine)  5,000 Units Subcutaneous BID    mirtazapine  7.5 mg Oral Nightly    metoprolol  2.5 mg Intravenous Q8H    sodium chloride flush  10 mL Intravenous 2 times per day    ferrous sulfate  325 mg Oral Daily with breakfast    cefepime  1 g Intravenous Q12H     Continuous Infusions:   dextrose 5% and 0.9% NaCl with KCl 20 mEq 65 mL/hr at 20 0104    sodium chloride Stopped (20 0005)     PRN Meds:morphine, hydrALAZINE, acetaminophen, sodium chloride flush, acetaminophen, ondansetron, magnesium hydroxide    OBJECTIVE:  /63   Pulse 102   Temp 98.1 °F (36.7 °C) (Oral)   Resp 20   Ht 5' 4\" (1.626 m)   Wt 121 lb (54.9 kg)   SpO2 92%   BMI 20.77 kg/m²   Temp  Av.6 °F (37 °C)  Min: 97.6 °F (36.4 °C)  Max: 100 °F (37.8 °C)  Constitutional:  The patient is awake, alert, and oriented. PALE THIN   Skin:    Warm and dry. HEENT:      AT/NC  Chest:   No use of accessory muscles to breathe. Symmetrical expansion. Few rales  Cardiovascular:  S1 and S2 are rhythmic and regular. No murmurs appreciated. Abdomen:   Positive bowel sounds to auscultation. Benign to palpation. Extremities:   No clubbing, no cyanosis, no edema.  weaknes   CNS    AAxO  Follow questions was given to the patient/FAMILY. Thank you for involving me in the care of Mort Second will continue to follow. Please do not hesitate to call for any questions or concerns.     Electronically signed by Nazanin Llanos MD on 5/26/2020 at 11:38 AM     5/21/2020

## 2020-05-26 NOTE — CARE COORDINATION
Son Wander Vergara requested I e-mail him a copy of the IMM letter instead of leaving in room or soft chart.  Electronically signed by Bob Rosado on 5/26/2020 at 12:18 PM

## 2020-05-26 NOTE — CARE COORDINATION
SS Note: COVID NEGATIVE AND WILL NEED A COVID TEST 48 HOURS PRIOR TO RETURN TO SNF. SW reviewed today's [facility] note, nurse Sylwia spoke with son Brennan Oh; at this time son is not wanting hospice care but in agreement for [facility] nurse continuing to follow during hospital stay. Per 5/22/20 SW note, pt was in a SNF stay since 5/18 at [facility]ON HAQUE, phone [phone], fax [phone]. Pt can return there when medically stable. Pt will need NO PRECERT, a signed ABDON and current PT/OT notes for the return to [facility]. Today this SW was in contact with [place] and she is following.   Electronically signed by SCOTT Dubois on 5/26/2020 at 2:40 PM

## 2020-05-27 PROCEDURE — 2700000000 HC OXYGEN THERAPY PER DAY

## 2020-05-27 PROCEDURE — 6370000000 HC RX 637 (ALT 250 FOR IP): Performed by: INTERNAL MEDICINE

## 2020-05-27 PROCEDURE — 2580000003 HC RX 258: Performed by: INTERNAL MEDICINE

## 2020-05-27 PROCEDURE — 97530 THERAPEUTIC ACTIVITIES: CPT

## 2020-05-27 PROCEDURE — 97110 THERAPEUTIC EXERCISES: CPT

## 2020-05-27 PROCEDURE — 94761 N-INVAS EAR/PLS OXIMETRY MLT: CPT

## 2020-05-27 PROCEDURE — 2060000000 HC ICU INTERMEDIATE R&B

## 2020-05-27 PROCEDURE — 6360000002 HC RX W HCPCS: Performed by: INTERNAL MEDICINE

## 2020-05-27 PROCEDURE — C9113 INJ PANTOPRAZOLE SODIUM, VIA: HCPCS | Performed by: INTERNAL MEDICINE

## 2020-05-27 PROCEDURE — 6360000002 HC RX W HCPCS: Performed by: SPECIALIST

## 2020-05-27 PROCEDURE — 2500000003 HC RX 250 WO HCPCS: Performed by: INTERNAL MEDICINE

## 2020-05-27 RX ADMIN — METOPROLOL TARTRATE 2.5 MG: 5 INJECTION INTRAVENOUS at 05:26

## 2020-05-27 RX ADMIN — Medication 10 ML: at 09:03

## 2020-05-27 RX ADMIN — ACETAMINOPHEN 650 MG: 650 SUPPOSITORY RECTAL at 13:33

## 2020-05-27 RX ADMIN — SODIUM CHLORIDE, PRESERVATIVE FREE 10 ML: 5 INJECTION INTRAVENOUS at 05:27

## 2020-05-27 RX ADMIN — CARBIDOPA AND LEVODOPA 1 TABLET: 25; 100 TABLET ORAL at 14:30

## 2020-05-27 RX ADMIN — CEFEPIME 1 G: 1 INJECTION, POWDER, FOR SOLUTION INTRAMUSCULAR; INTRAVENOUS at 22:29

## 2020-05-27 RX ADMIN — Medication 10 ML: at 13:33

## 2020-05-27 RX ADMIN — METOPROLOL TARTRATE 2.5 MG: 5 INJECTION INTRAVENOUS at 22:39

## 2020-05-27 RX ADMIN — AMLODIPINE BESYLATE 5 MG: 5 TABLET ORAL at 09:03

## 2020-05-27 RX ADMIN — METOPROLOL TARTRATE 2.5 MG: 5 INJECTION INTRAVENOUS at 13:33

## 2020-05-27 RX ADMIN — ASPIRIN 150 MG: 300 SUPPOSITORY RECTAL at 09:03

## 2020-05-27 RX ADMIN — HEPARIN SODIUM 5000 UNITS: 5000 INJECTION, SOLUTION INTRAVENOUS; SUBCUTANEOUS at 09:04

## 2020-05-27 RX ADMIN — CEFEPIME 1 G: 1 INJECTION, POWDER, FOR SOLUTION INTRAMUSCULAR; INTRAVENOUS at 09:03

## 2020-05-27 RX ADMIN — FLUCONAZOLE 400 MG: 2 INJECTION, SOLUTION INTRAVENOUS at 13:34

## 2020-05-27 RX ADMIN — CARBIDOPA AND LEVODOPA 1 TABLET: 25; 100 TABLET ORAL at 22:38

## 2020-05-27 RX ADMIN — Medication 10 ML: at 22:41

## 2020-05-27 RX ADMIN — MIRTAZAPINE 7.5 MG: 15 TABLET, FILM COATED ORAL at 22:37

## 2020-05-27 RX ADMIN — ACETAMINOPHEN 650 MG: 650 SUPPOSITORY RECTAL at 09:03

## 2020-05-27 RX ADMIN — HEPARIN SODIUM 5000 UNITS: 5000 INJECTION, SOLUTION INTRAVENOUS; SUBCUTANEOUS at 22:42

## 2020-05-27 RX ADMIN — FERROUS SULFATE TAB 325 MG (65 MG ELEMENTAL FE) 325 MG: 325 (65 FE) TAB at 09:03

## 2020-05-27 RX ADMIN — CARBIDOPA AND LEVODOPA 1 TABLET: 25; 100 TABLET ORAL at 09:03

## 2020-05-27 RX ADMIN — SODIUM CHLORIDE: 9 INJECTION, SOLUTION INTRAVENOUS at 12:07

## 2020-05-27 RX ADMIN — PANTOPRAZOLE SODIUM 40 MG: 40 INJECTION, POWDER, FOR SOLUTION INTRAVENOUS at 09:04

## 2020-05-27 ASSESSMENT — PAIN SCALES - WONG BAKER: WONGBAKER_NUMERICALRESPONSE: 0

## 2020-05-27 ASSESSMENT — PAIN DESCRIPTION - LOCATION: LOCATION: LEG

## 2020-05-27 ASSESSMENT — PAIN SCALES - GENERAL
PAINLEVEL_OUTOF10: 3
PAINLEVEL_OUTOF10: 0

## 2020-05-27 ASSESSMENT — PAIN DESCRIPTION - PAIN TYPE: TYPE: ACUTE PAIN

## 2020-05-27 ASSESSMENT — PAIN DESCRIPTION - DESCRIPTORS: DESCRIPTORS: DISCOMFORT

## 2020-05-27 NOTE — PROGRESS NOTES
1806  · Pt is a [de-identified] y/o M who presented with sepsis and respiratory failure  · Serum creatinine yesterday: 1.1; CrCl ~ 42 mL/min; baseline Scr ~ 1.5    Plan:  · Vancomycin 1000 mg Q24H  · Repeat trough tomorrow evening   · Follow renal function  · Pharmacist will follow and monitor/adjust dosing as necessary    Marquez Lawrence, PharmD  5/27/2020  12:26 PM  Pager: [phone]

## 2020-05-27 NOTE — PROGRESS NOTES
[address] [place] [address] [facility]  Progress Note    NAME:Lalito Osborne  1940  DATE:20    Pt was seen FACE TO FACE FOR WBC    SUBJECTIVE:  Chief Complaint   Patient presents with    Shortness of Breath    Pneumonia     was recently discharged from hospital last week   [address] [place] [address] Drive  Has sitter  HAS COUGH  ULYW70.3  [id number]  Patient is tolerating medications. No reported adverse drug reactions. Review of systems:  As stated above in the chief complaint, otherwise negative. Medications:  Scheduled Meds:   aspirin  150 mg Rectal Daily    pantoprazole  40 mg Intravenous Daily    fluconazole  400 mg Intravenous Q24H    vancomycin  1,000 mg Intravenous Q24H    amLODIPine  5 mg Oral Daily    heparin (porcine)  5,000 Units Subcutaneous BID    mirtazapine  7.5 mg Oral Nightly    metoprolol  2.5 mg Intravenous Q8H    sodium chloride flush  10 mL Intravenous 2 times per day    ferrous sulfate  325 mg Oral Daily with breakfast    cefepime  1 g Intravenous Q12H     Continuous Infusions:   dextrose 5% and 0.9% NaCl with KCl 20 mEq 65 mL/hr at 20 1456    sodium chloride Stopped (20 0131)     PRN Meds:morphine, hydrALAZINE, acetaminophen, sodium chloride flush, acetaminophen, ondansetron, magnesium hydroxide    OBJECTIVE:  BP (!) 117/56   Pulse 108   Temp 98.2 °F (36.8 °C) (Axillary)   Resp 20   Ht 5' 4\" (1.626 m)   Wt 121 lb (54.9 kg)   SpO2 92%   BMI 20.77 kg/m²   Temp  Av.5 °F (36.9 °C)  Min: 98.1 °F (36.7 °C)  Max: 99.2 °F (37.3 °C)  Constitutional:  The patient is awake, alert, and oriented. PALE THIN   Skin:    Warm and dry. HEENT:      AT/NC  Chest:   No use of accessory muscles to breathe. Symmetrical expansion. Few rales post  Cardiovascular:  S1 and S2 are rhythmic and regular. No murmurs appreciated. Abdomen:   Positive bowel sounds to auscultation. Benign to palpation. Extremities:   No clubbing, no cyanosis,  edema.  weakness  CNS hesitate to call for any questions or concerns.     Electronically signed by Sri Brantley MD on 5/27/2020 at 8:08 AM     5/21/2020

## 2020-05-27 NOTE — PROGRESS NOTES
needed to walk in hospital room?: Total  How much help from another person for climbing 3-5 steps with a railing?: Total  AM-PAC Inpatient Mobility Raw Score : 9  AM-PAC Inpatient T-Scale Score : 30.55  Mobility Inpatient CMS 0-100% Score: 81.38  Mobility Inpatient CMS G-Code Modifier : CM    Nursing cleared patient for PT treatment. .      OBJECTIVE:   Initial Evaluation  Date: 5/22/2020 Treatment Date:    5/27/2020   Short Term/ Long Term   Goals   Was pt agreeable to Eval/treatment? Yes    To be met in 3 days   Pain level   No c/o    unrated    Bed Mobility    Rolling: Moderate assist of 1    Supine to sit: Moderate assist of  2    Sit to supine: Moderate assist of  2    Scooting: Moderate assist of  2   Rolling: Maximal assist of  2    Supine to sit: Maximal assist of  2    Sit to supine: Maximal assist of  2    Scooting: Maximal assist of  2     Rolling: Minimal assist of 1    Supine to sit: Minimal assist of 1    Sit to supine: Minimal assist of 1    Scooting: Minimal assist of 1     Transfers Sit to stand: Maximal assist of  2 with chux Sit to stand: Not assessed   Stand pivot: Not assessed          Sit to stand:  Moderate assist of 1     Ambulation    1 side steps using  hand held assist with Maximal assist of  2     not assessed      10 feet using  wheeled walker with Moderate assist of 1    Stair negotiation: ascended and descended   Not assessed            ROM Within functional limits      Increase range of motion 10% of affected joints    Strength BUE: refer to OT eval/5  RLE:  3/5  LLE:  2+/5   Increase strength in affected mm groups by 1/3 grade   Balance Sitting EOB:  fair    Dynamic Standing:  poor hand held assist x 2 Sitting EOB:  poor  Dynamic Standing:  not assessed   Sitting EOB:  good -  Dynamic Standing: fair wheeled walker        Patient is Alert & Oriented x person  and follows one step directions    Sensation:  Pt denies numbness and tingling to extremities  Edema:  no      Vitals:  Blood

## 2020-05-27 NOTE — PROGRESS NOTES
Patient pulled IV out of arm. Charge nurse notified. Will get telesitter for patient. Patient is not oriented and is not able to receive education on medications.

## 2020-05-28 PROCEDURE — 97530 THERAPEUTIC ACTIVITIES: CPT

## 2020-05-28 PROCEDURE — 6360000002 HC RX W HCPCS: Performed by: INTERNAL MEDICINE

## 2020-05-28 PROCEDURE — 2500000003 HC RX 250 WO HCPCS: Performed by: INTERNAL MEDICINE

## 2020-05-28 PROCEDURE — 97535 SELF CARE MNGMENT TRAINING: CPT

## 2020-05-28 PROCEDURE — 6370000000 HC RX 637 (ALT 250 FOR IP): Performed by: INTERNAL MEDICINE

## 2020-05-28 PROCEDURE — 94761 N-INVAS EAR/PLS OXIMETRY MLT: CPT

## 2020-05-28 PROCEDURE — 2580000003 HC RX 258: Performed by: INTERNAL MEDICINE

## 2020-05-28 PROCEDURE — 2060000000 HC ICU INTERMEDIATE R&B

## 2020-05-28 PROCEDURE — 6370000000 HC RX 637 (ALT 250 FOR IP): Performed by: SPECIALIST

## 2020-05-28 PROCEDURE — 97110 THERAPEUTIC EXERCISES: CPT

## 2020-05-28 RX ORDER — AMOXICILLIN AND CLAVULANATE POTASSIUM 875; 125 MG/1; MG/1
1 TABLET, FILM COATED ORAL EVERY 12 HOURS SCHEDULED
Status: DISCONTINUED | OUTPATIENT
Start: 2020-05-28 | End: 2020-06-02 | Stop reason: HOSPADM

## 2020-05-28 RX ADMIN — Medication 10 ML: at 20:27

## 2020-05-28 RX ADMIN — CEFEPIME 1 G: 1 INJECTION, POWDER, FOR SOLUTION INTRAMUSCULAR; INTRAVENOUS at 08:19

## 2020-05-28 RX ADMIN — AMLODIPINE BESYLATE 5 MG: 5 TABLET ORAL at 08:24

## 2020-05-28 RX ADMIN — METOPROLOL TARTRATE 2.5 MG: 5 INJECTION INTRAVENOUS at 14:09

## 2020-05-28 RX ADMIN — METOPROLOL TARTRATE 2.5 MG: 5 INJECTION INTRAVENOUS at 06:30

## 2020-05-28 RX ADMIN — HEPARIN SODIUM 5000 UNITS: 5000 INJECTION, SOLUTION INTRAVENOUS; SUBCUTANEOUS at 08:19

## 2020-05-28 RX ADMIN — AMOXICILLIN AND CLAVULANATE POTASSIUM 1 TABLET: 875; 125 TABLET, FILM COATED ORAL at 17:36

## 2020-05-28 RX ADMIN — Medication 10 ML: at 08:20

## 2020-05-28 RX ADMIN — SODIUM CHLORIDE: 9 INJECTION, SOLUTION INTRAVENOUS at 01:02

## 2020-05-28 RX ADMIN — HEPARIN SODIUM 5000 UNITS: 5000 INJECTION, SOLUTION INTRAVENOUS; SUBCUTANEOUS at 20:25

## 2020-05-28 RX ADMIN — MIRTAZAPINE 7.5 MG: 15 TABLET, FILM COATED ORAL at 20:26

## 2020-05-28 RX ADMIN — ASPIRIN 150 MG: 300 SUPPOSITORY RECTAL at 08:18

## 2020-05-28 RX ADMIN — CARBIDOPA AND LEVODOPA 1 TABLET: 25; 100 TABLET ORAL at 14:09

## 2020-05-28 RX ADMIN — METOPROLOL TARTRATE 2.5 MG: 5 INJECTION INTRAVENOUS at 20:23

## 2020-05-28 RX ADMIN — FERROUS SULFATE TAB 325 MG (65 MG ELEMENTAL FE) 325 MG: 325 (65 FE) TAB at 08:18

## 2020-05-28 RX ADMIN — CARBIDOPA AND LEVODOPA 1 TABLET: 25; 100 TABLET ORAL at 08:18

## 2020-05-28 RX ADMIN — CARBIDOPA AND LEVODOPA 1 TABLET: 25; 100 TABLET ORAL at 20:27

## 2020-05-28 ASSESSMENT — PAIN SCALES - GENERAL
PAINLEVEL_OUTOF10: 0

## 2020-05-28 NOTE — PROGRESS NOTES
Pharmacy Consultation Note  (Antibiotic Dosing and Monitoring)    Initial consult date: 5/21  Consulting physician: Dr Stann Hodgkin  Drug(s): Vancomycin IV  Indication: Sepsis    Vancomycin discontinued by the provider. Converted to PO therapy. Pharmacy to sign off on the consult. Please re-consult if needed.     Thanks,    Jaime Sanchez, PharmD  5/28/2020  12:16 PM  Pager: [phone]

## 2020-05-28 NOTE — PROGRESS NOTES
[address] [place] [address] [facility]  Progress Note    NAME:Lalito Hanson  1940  DATE:20    Pt was seen FACE TO FACE FOR WBC    SUBJECTIVE:  Chief Complaint   Patient presents with    Shortness of Breath    Pneumonia     was recently discharged from hospital last week     Has sitter  stiff  afebrile  WBC17  Patient is tolerating medications. No reported adverse drug reactions. Review of systems:  As stated above in the chief complaint, otherwise negative. Medications:  Scheduled Meds:   carbidopa-levodopa  1 tablet Oral TID    aspirin  150 mg Rectal Daily    amLODIPine  5 mg Oral Daily    heparin (porcine)  5,000 Units Subcutaneous BID    mirtazapine  7.5 mg Oral Nightly    metoprolol  2.5 mg Intravenous Q8H    sodium chloride flush  10 mL Intravenous 2 times per day    ferrous sulfate  325 mg Oral Daily with breakfast    cefepime  1 g Intravenous Q12H     Continuous Infusions:   sodium chloride Stopped (20 0320)     PRN Meds:hydrALAZINE, acetaminophen, sodium chloride flush, acetaminophen, ondansetron, magnesium hydroxide    OBJECTIVE:  BP (!) 154/65   Pulse 106   Temp 98.2 °F (36.8 °C) (Oral)   Resp 22   Ht 5' 4\" (1.626 m)   Wt 121 lb (54.9 kg)   SpO2 94%   BMI 20.77 kg/m²   Temp  Av.7 °F (36.5 °C)  Min: 96.7 °F (35.9 °C)  Max: 98.3 °F (36.8 °C)  Constitutional:  The patient is awake, alert, and oriented. PALE THIN   Skin:    Warm and dry. HEENT:      AT/NC  Chest:    Symmetrical expansion. Few rales post  Cardiovascular:  S1 and S2 are rhythmic and regular. No murmurs appreciated. Abdomen:   Positive bowel sounds to auscultation. Benign to palpation. nt   Extremities:   No clubbing, no cyanosis,  Edema.    CNS    Less alert  Lines: piv    Radiology:  Laboratory and Tests Review:  Lab Results   Component Value Date    WBC 17.0 (H) 2020    WBC 20.2 (H) 2020    WBC 18.4 (H) 2020    HGB 9.0 (L) 2020    HCT 27.1 (L) 2020

## 2020-05-28 NOTE — PROGRESS NOTES
Patient ID:  Nolvia Jeffery  [id number]  87 y.o.  1940    HPI: was up all night, sleeping, easily awakened, family decided to go with DNR comfort care. ROS:  Cardiovascular:   Denies any chest pain, irregular heartbeats, or palpitations. Respiratory:   Denies shortness of breath, coughing, sputum production, hemoptysis, or wheezing. Gastrointestinal:   Denies nausea, vomiting, diarrhea, or constipation. Denies any abdominal pain. Extremities:   Denies any lower extremity swelling or edema. Neurology:    Still with significant rigidity both upper and lower extremity. Derm:    Denies any rashes, ulcers, or excoriations. Denies bruising. Genitourinary:    Denies any urgency, frequency, hematuria. Voiding without difficulty. Physical Exam:    Vitals:    05/28/20 0753   BP: (!) 154/65   Pulse: 106   Resp: 22   Temp: 98.2 °F (36.8 °C)   SpO2: 94%       HEENT:  PERRLA. EOMI. Sclera clear. Buccal mucosa moist.    Neck:  Supple. Trachea midline. No thyromegaly. No JVD. No bruits. Heart:  Rhythm regular, rate controlled. No murmurs. Lungs:  Symmetrical. Clear to auscultation bilaterally. No wheezes. No rhonchi. No rales. Abdomen: Soft. Non-tender. Non-distended. Bowel sounds positive. No organomegaly or masses. No pain on palpation    Extremities:  Peripheral pulses present. No peripheral edema. No ulcers. Rigidity both upper and lower extremity. Neurologic:  Alert x 1. No focal deficit. Cranial nerves grossly intact. Skin:  No petechia. No hemorrhage. No wounds.     Labs:  CBC:   Lab Results   Component Value Date    WBC 17.0 05/26/2020    RBC 3.12 05/26/2020    HGB 9.0 05/26/2020    HCT 27.1 05/26/2020    MCV 86.9 05/26/2020    MCH 28.8 05/26/2020    MCHC 33.2 05/26/2020    RDW 14.5 05/26/2020     05/26/2020    MPV 10.7 05/26/2020     CMP:    Lab Results   Component Value Date     05/26/2020    K 4.3 05/26/2020    K 3.2 05/21/2020     05/26/2020    CO2 22 05/26/2020    BUN 13 05/26/2020    CREATININE 1.1 05/26/2020    GFRAA >60 05/26/2020    LABGLOM >60 05/26/2020    GLUCOSE 143 05/26/2020    PROT 7.1 05/22/2020    LABALBU 2.4 05/22/2020    CALCIUM 8.4 05/26/2020    BILITOT 0.3 05/22/2020    ALKPHOS 71 05/22/2020    AST 29 05/22/2020    ALT 15 05/22/2020     PT/INR:    Lab Results   Component Value Date    PROTIME 13.8 03/20/2019    INR 1.2 03/20/2019         FL Modified Barium Swallow W Video   Final Result   Laryngeal penetration with sips of nectar consistency barium from a cup. No   evidence of dorene aspiration. Please see separate speech pathology report for full discussion of findings   and recommendations. XR HAND RIGHT (2 VIEWS)   Final Result   1. Soft tissue swelling. No acute osseous abnormality. 2. Degenerative changes as described. US CAROTID ARTERY BILATERAL   Final Result   There is diffuse mixed calcified atherosclerotic plaque throughout the   bilateral common, internal and external carotid arteries. Although the   ICA/CCA PSV ratios are less than 2, the velocities are decreased throughout   the internal carotid arteries suggesting high-grade luminal narrowing. Bilateral vertebral arteries are patent with flow in the normal direction. CT Head WO Contrast   Final Result   1. Large area of hypoattenuation involving the right occipital, parietal,   temporal, and frontal lobes which is new compared with CT head from May 14,   2020 and is most consistent with infarction. MRI brain could be obtained for   further evaluation. 2. Chronic microvascular ischemic changes and global cerebral atrophy. The findings were sent to the Radiology Results [address] at 5:04   pm on 5/23/2020to be communicated to a licensed caregiver. CT Chest WO Contrast   Final Result   No significant change in appearance of the lungs with findings likely related   to chronic interstitial lung disease.          XR CHEST

## 2020-05-28 NOTE — PROGRESS NOTES
Open Wounds(Multiple areas noted Tx in place)  · Current Nutrition Therapies:  · Oral Diet Orders: Dysphagia Pureed (Dysphagia 1), Moderately Thick   · Oral Diet intake: 26-50%  · Oral Nutrition Supplement (ONS) Orders: None  · Anthropometric Measures:  · Ht: 5' 4\" (162.6 cm)   · Current Body Wt: 121 lb (54.9 kg)(5/28 bed scale)  · Admission Body Wt: 121 lb (54.9 kg)(5/21 bed scale)  · Usual Body Wt: 108 lb (49 kg)(5/14 bed scale previous admit)  · % Weight Change:   No weight changes DURING this admission: +12.04% wt increase from previous admit   · Ideal Body Wt: 130 lb (59 kg) % Ideal Body 93%  · BMI Classification: BMI 18.5 - 24.9 Normal Weight(BMI 20.8)    Nutrition Interventions:   Continue current diet, Start ONS  Continued Inpatient Monitoring    Nutrition Evaluation:   · Evaluation: Goals set   · Goals: Pt to consume >50-75% of most meals and ONS    · Monitoring: Meal Intake, Diet Tolerance, Supplement Intake, Skin Integrity, Wound Healing, I&O, Mental Status/Confusion, Weight, Pertinent Labs, Chewing/Swallowing, Diarrhea, Monitor Bowel Function      Electronically signed by Sarah Hollis RD, MICHAEL on 5/28/20 at 10:41 AM EDT    Contact Number: [phone]

## 2020-05-28 NOTE — PROGRESS NOTES
Palliative Medicine   Progression of Care     Patient in bed at this time laying on his right side.  remains in the room. Patient's urine is cloudy, 1 small bowel movement today. [facility]. Patient at this time is sound asleep. In order to wake the patient, must do a mild to moderate sternal rub. Per staff RN patient started on Sinemet today. Reviewed patient with staff RN. Staff RN spoke to son today who in turn wants to change CODE STATUS to a limited no intubation. Dr. Juan Smith notified by staff RN of son's desire to change CODE STATUS. Will follow along for any changes.       Palliative Care following closely for support of patient and family   Jaya Shaw APRN-CNP, AGACNP-BC

## 2020-05-28 NOTE — CARE COORDINATION
SS NOTE: COVID NEGATIVE AND WILL NEED A COVID TEST 48 HOURS PRIOR TO RETURN TO SNF. PER SNF PT NEEDS TO BE SITTER AND TELESITTER FREE FOR 48 HOURS PRIOR TO DC. Pt is from [facility], phone [phone], fax [phone] and can return there when medically stable. Pt will need NO PRECERT, a signed ABDON and current PT/OT notes for the return to [facility]. [facility] liaison continues to follow. Cornelio Thompson. 5/28/2020.12:17 PM.

## 2020-05-28 NOTE — PLAN OF CARE
Problem: Inadequate protein-energy intake  (NI-5.3)  Goal: Food and/or Nutrient Delivery  Description: Start ONS TID  Outcome: Met This Shift

## 2020-05-28 NOTE — PROGRESS NOTES
Chronic microvascular ischemic changes and global cerebral atrophy. The findings were sent to the Radiology Results [address] at 5:04   pm on 5/23/2020to be communicated to a licensed caregiver. CT Chest WO Contrast   Final Result   No significant change in appearance of the lungs with findings likely related   to chronic interstitial lung disease. XR CHEST PORTABLE   Final Result   No acute cardiopulmonary disease. Stable interstitial changes throughout the   lungs. Other Data:      Intake/Output Summary (Last 24 hours) at 5/27/2020 2108  Last data filed at 5/27/2020 1700  Gross per 24 hour   Intake 1277.92 ml   Output 1475 ml   Net -197.08 ml         Scheduled Medications:   carbidopa-levodopa  1 tablet Oral TID    aspirin  150 mg Rectal Daily    pantoprazole  40 mg Intravenous Daily    fluconazole  400 mg Intravenous Q24H    amLODIPine  5 mg Oral Daily    heparin (porcine)  5,000 Units Subcutaneous BID    mirtazapine  7.5 mg Oral Nightly    metoprolol  2.5 mg Intravenous Q8H    sodium chloride flush  10 mL Intravenous 2 times per day    ferrous sulfate  325 mg Oral Daily with breakfast    cefepime  1 g Intravenous Q12H         Infusion Medications:   dextrose 5% and 0.9% NaCl with KCl 20 mEq 65 mL/hr at 05/27/20 0815    sodium chloride Stopped (05/27/20 1558)       Assessment:   Patient Active Problem List    Diagnosis Date Noted    Stroke due to occlusion of right middle cerebral artery ([facility] 05/24/2020    Sepsis ([facility] 05/14/2020    Pneumonia 03/14/2019    Severe protein-calorie malnutrition ([address] 03/14/2019    Weakness 03/13/2019    COPD (chronic obstructive pulmonary disease) (HCC)     Enlarged prostate    Acute respiratory failure with hypoxia secondary to most likely aspiration pneumonitis.       Plan: Patient started feeding, spoke with patient's son, no evidence of sepsis, vancomycin discontinued, probably can switch to oral antibiotic, extrapyramidal symptoms, will try low-dose Sinemet. Continue current therapy. See orders for further plan of care.     Completed By:  Dr. Lennox Marroquin MD, Lynnette Sylvester.  9:08 PM  5/27/2020      Electronically signed by Josselin Fernandez MD on 5/27/20 at 9:08 PM EDT

## 2020-05-28 NOTE — PLAN OF CARE
Problem: Falls - Risk of:  Goal: Will remain free from falls  Description: Will remain free from falls  5/28/2020 0837 by Dax Siddiqui RN  Outcome: Met This Shift  5/28/2020 0234 by Deejay Schulz RN  Outcome: Met This Shift  Goal: Absence of physical injury  Description: Absence of physical injury  5/28/2020 0837 by aDx Siddiqui RN  Outcome: Met This Shift  5/28/2020 0234 by Deejay Schulz RN  Outcome: Met This Shift     Problem: Discharge Planning:  Goal: Discharged to appropriate level of care  Description: Discharged to appropriate level of care  5/28/2020 0837 by Dax Siddiqui RN  Outcome: Met This Shift  5/28/2020 0234 by Deejay Schulz RN  Outcome: Not Met This Shift     Problem: Gas Exchange - Impaired:  Goal: Levels of oxygenation will improve  Description: Levels of oxygenation will improve  5/28/2020 0837 by Dax Siddiqui RN  Outcome: Met This Shift  5/28/2020 0234 by Deejay Schulz RN  Outcome: Met This Shift     Problem: Infection, Septic Shock:  Goal: Will show no infection signs and symptoms  Description: Will show no infection signs and symptoms  5/28/2020 0837 by Dax Siddiqui RN  Outcome: Met This Shift  5/28/2020 0234 by Deejay Schulz RN  Outcome: Met This Shift     Problem: Pain:  Goal: Pain level will decrease  Description: Pain level will decrease  5/28/2020 0837 by Dax Siddiqui RN  Outcome: Met This Shift  5/28/2020 0234 by Deejay Schulz RN  Outcome: Met This Shift  Goal: Control of acute pain  Description: Control of acute pain  5/28/2020 0837 by Dax Siddiqui RN  Outcome: Met This Shift  5/28/2020 0234 by Deejay Schulz RN  Outcome: Met This Shift  Goal: Control of chronic pain  Description: Control of chronic pain  5/28/2020 0837 by Dax Siddiqui RN  Outcome: Met This Shift  5/28/2020 0234 by Deejay Schulz RN  Outcome: Met This Shift

## 2020-05-28 NOTE — PROGRESS NOTES
Vitals:  Blood Pressure at rest  Blood Pressure post session    Heart Rate at rest  Heart Rate post activity    SPO2 at rest %  SPO2 post activity % recovery      Patient education  Patient educated on role of Physical Therapy, risks of immobility and plan of care and safety      Patient response to education:   Pt verbalized understanding Pt demonstrated skill Pt requires further education in this area   Yes Partial Yes       ASSESSMENT:    Comments:  Pt needing varying assistance from Moderate assist to for short periods of time Supervision d/t anterior lean. Pt able to attain 1/4 upright position with standing and assist x 2. Pt more alert and able to follow directions at times. Treatment:  Patient practiced and was instructed in the following treatment:      Sat edge of bed 20 minutes with Moderate assist of 1 to increase dynamic sitting balance and activity tolerance. Co-treated with speech therapy and worked on trying to maintain mid-line position and keep his head up and neutral position. Pt performed seated exercises, stood for 2 reps, and returned to supine. Therapeutic Exercises: ankle pumps, long arc quad and seated marching, elbow, wrist, flex/ext, x 10 reps with PROM/AAROM. Verbal/tactile cues for increased participation and ROM. At end of session, patient in bed in chair position with 1:1 sitter call light and phone within reach,   all lines and tubes intact, nursing notified. Patient would benefit from continued skilled Physical Therapy to improve functional independence and quality of life. PLAN:    Patient is making limited progress towards established goals. Will continue with current Plan of care. Time in  10:35  Time out  11:00    Total Treatment Time  25 minutes    CPT codes:  Therapeutic activities (23894)   17 minutes  1 unit(s)  Therapeutic exercises (73311)   8 minutes  1 unit(s)    Jay Jay Chen  hospitals  LIC # [id number]

## 2020-05-29 LAB
ANISOCYTOSIS: ABNORMAL
BASOPHILS ABSOLUTE: 0 E9/L (ref 0–0.2)
BASOPHILS RELATIVE PERCENT: 0 % (ref 0–2)
EOSINOPHILS ABSOLUTE: 0.34 E9/L (ref 0.05–0.5)
EOSINOPHILS RELATIVE PERCENT: 2 % (ref 0–6)
HCT VFR BLD CALC: 30 % (ref 37–54)
HEMOGLOBIN: 9.8 G/DL (ref 12.5–16.5)
LYMPHOCYTES ABSOLUTE: 1.18 E9/L (ref 1.5–4)
LYMPHOCYTES RELATIVE PERCENT: 7 % (ref 20–42)
MCH RBC QN AUTO: 28.2 PG (ref 26–35)
MCHC RBC AUTO-ENTMCNC: 32.7 % (ref 32–34.5)
MCV RBC AUTO: 86.5 FL (ref 80–99.9)
MONOCYTES ABSOLUTE: 1.01 E9/L (ref 0.1–0.95)
MONOCYTES RELATIVE PERCENT: 6 % (ref 2–12)
NEUTROPHILS ABSOLUTE: 14.28 E9/L (ref 1.8–7.3)
NEUTROPHILS RELATIVE PERCENT: 85 % (ref 43–80)
OVALOCYTES: ABNORMAL
PDW BLD-RTO: 15.2 FL (ref 11.5–15)
PLATELET # BLD: 606 E9/L (ref 130–450)
PMV BLD AUTO: 10.3 FL (ref 7–12)
POIKILOCYTES: ABNORMAL
POLYCHROMASIA: ABNORMAL
RBC # BLD: 3.47 E12/L (ref 3.8–5.8)
TARGET CELLS: ABNORMAL
WBC # BLD: 16.8 E9/L (ref 4.5–11.5)

## 2020-05-29 PROCEDURE — 92523 SPEECH SOUND LANG COMPREHEN: CPT | Performed by: SPEECH-LANGUAGE PATHOLOGIST

## 2020-05-29 PROCEDURE — 92526 ORAL FUNCTION THERAPY: CPT | Performed by: SPEECH-LANGUAGE PATHOLOGIST

## 2020-05-29 PROCEDURE — 2500000003 HC RX 250 WO HCPCS: Performed by: INTERNAL MEDICINE

## 2020-05-29 PROCEDURE — 85025 COMPLETE CBC W/AUTO DIFF WBC: CPT

## 2020-05-29 PROCEDURE — 6370000000 HC RX 637 (ALT 250 FOR IP): Performed by: INTERNAL MEDICINE

## 2020-05-29 PROCEDURE — 6370000000 HC RX 637 (ALT 250 FOR IP): Performed by: SPECIALIST

## 2020-05-29 PROCEDURE — 2580000003 HC RX 258: Performed by: INTERNAL MEDICINE

## 2020-05-29 PROCEDURE — 97530 THERAPEUTIC ACTIVITIES: CPT

## 2020-05-29 PROCEDURE — 36415 COLL VENOUS BLD VENIPUNCTURE: CPT

## 2020-05-29 PROCEDURE — 2060000000 HC ICU INTERMEDIATE R&B

## 2020-05-29 PROCEDURE — 6360000002 HC RX W HCPCS: Performed by: INTERNAL MEDICINE

## 2020-05-29 PROCEDURE — 97535 SELF CARE MNGMENT TRAINING: CPT

## 2020-05-29 RX ORDER — L. ACIDOPHILUS/L.BULGARICUS 100MM CELL
1 GRANULES IN PACKET (EA) ORAL 2 TIMES DAILY
Qty: 60 PACKET | Refills: 0 | Status: SHIPPED | OUTPATIENT
Start: 2020-05-29 | End: 2020-06-28

## 2020-05-29 RX ORDER — AMOXICILLIN AND CLAVULANATE POTASSIUM 875; 125 MG/1; MG/1
1 TABLET, FILM COATED ORAL EVERY 12 HOURS SCHEDULED
Qty: 20 TABLET | Refills: 0 | Status: SHIPPED | OUTPATIENT
Start: 2020-05-29 | End: 2020-06-08

## 2020-05-29 RX ORDER — AMOXICILLIN AND CLAVULANATE POTASSIUM 875; 125 MG/1; MG/1
1 TABLET, FILM COATED ORAL EVERY 12 HOURS SCHEDULED
Qty: 20 TABLET | Refills: 0 | Status: CANCELLED | OUTPATIENT
Start: 2020-05-29 | End: 2020-06-08

## 2020-05-29 RX ORDER — SODIUM CHLORIDE 9 MG/ML
INJECTION, SOLUTION INTRAVENOUS CONTINUOUS
Status: DISCONTINUED | OUTPATIENT
Start: 2020-05-29 | End: 2020-06-02

## 2020-05-29 RX ADMIN — AMLODIPINE BESYLATE 5 MG: 5 TABLET ORAL at 08:16

## 2020-05-29 RX ADMIN — CARBIDOPA AND LEVODOPA 1 TABLET: 25; 100 TABLET ORAL at 14:21

## 2020-05-29 RX ADMIN — AMOXICILLIN AND CLAVULANATE POTASSIUM 1 TABLET: 875; 125 TABLET, FILM COATED ORAL at 05:18

## 2020-05-29 RX ADMIN — AMOXICILLIN AND CLAVULANATE POTASSIUM 1 TABLET: 875; 125 TABLET, FILM COATED ORAL at 17:09

## 2020-05-29 RX ADMIN — HEPARIN SODIUM 5000 UNITS: 5000 INJECTION, SOLUTION INTRAVENOUS; SUBCUTANEOUS at 22:07

## 2020-05-29 RX ADMIN — FERROUS SULFATE TAB 325 MG (65 MG ELEMENTAL FE) 325 MG: 325 (65 FE) TAB at 08:16

## 2020-05-29 RX ADMIN — METOPROLOL TARTRATE 2.5 MG: 5 INJECTION INTRAVENOUS at 14:21

## 2020-05-29 RX ADMIN — SODIUM CHLORIDE: 9 INJECTION, SOLUTION INTRAVENOUS at 14:47

## 2020-05-29 RX ADMIN — CARBIDOPA AND LEVODOPA 1 TABLET: 25; 100 TABLET ORAL at 08:16

## 2020-05-29 RX ADMIN — ASPIRIN 150 MG: 300 SUPPOSITORY RECTAL at 08:18

## 2020-05-29 RX ADMIN — Medication 10 ML: at 20:54

## 2020-05-29 RX ADMIN — ACETAMINOPHEN 500 MG: 500 TABLET, FILM COATED ORAL at 04:56

## 2020-05-29 RX ADMIN — Medication 10 ML: at 08:19

## 2020-05-29 RX ADMIN — CARBIDOPA AND LEVODOPA 1 TABLET: 25; 100 TABLET ORAL at 20:51

## 2020-05-29 RX ADMIN — METOPROLOL TARTRATE 2.5 MG: 5 INJECTION INTRAVENOUS at 04:56

## 2020-05-29 RX ADMIN — MIRTAZAPINE 7.5 MG: 15 TABLET, FILM COATED ORAL at 20:51

## 2020-05-29 RX ADMIN — HEPARIN SODIUM 5000 UNITS: 5000 INJECTION, SOLUTION INTRAVENOUS; SUBCUTANEOUS at 08:18

## 2020-05-29 RX ADMIN — METOPROLOL TARTRATE 2.5 MG: 5 INJECTION INTRAVENOUS at 20:51

## 2020-05-29 ASSESSMENT — PAIN SCALES - GENERAL
PAINLEVEL_OUTOF10: 0

## 2020-05-29 NOTE — PROGRESS NOTES
Patient ID:  Kameron Alatorre  [id number]  38 y.o.  1940    HPI:  Patient voiced no new complaints since hospital admission. Questions, answers, and tests reviewed. ROS:  Cardiovascular:   Denies any chest pain, irregular heartbeats, or palpitations. Respiratory:   Denies shortness of breath, coughing, sputum production, hemoptysis, or wheezing. Gastrointestinal:   Denies nausea, vomiting, diarrhea, or constipation. Denies any abdominal pain. Extremities:   Denies any lower extremity swelling or edema. Neurology:    Denies any headache or focal neurological deficits. No weakness or paresthesia. Derm:    Denies any rashes, ulcers, or excoriations. Denies bruising. Genitourinary:    Denies any urgency, frequency, hematuria. Voiding without difficulty. Physical Exam:    Vitals:    05/28/20 2215   BP:    Pulse: 100   Resp:    Temp:    SpO2:        HEENT:  PERRLA. EOMI. Sclera clear. Buccal mucosa moist.    Neck:  Supple. Trachea midline. No thyromegaly. No JVD. No bruits. Heart:  Rhythm regular, rate controlled. No murmurs. Lungs:  Symmetrical. Clear to auscultation bilaterally. No wheezes. No rhonchi. No rales. Abdomen: Soft. Non-tender. Non-distended. Bowel sounds positive. No organomegaly or masses. No pain on palpation    Extremities:  Peripheral pulses present. No peripheral edema. No ulcers. Neurologic:  Alert x 2. No focal deficit. Cranial nerves grossly intact. Skin:  No petechia. No hemorrhage. No wounds.     Labs:  CBC:   Lab Results   Component Value Date    WBC 16.8 05/29/2020    RBC 3.47 05/29/2020    HGB 9.8 05/29/2020    HCT 30.0 05/29/2020    MCV 86.5 05/29/2020    MCH 28.2 05/29/2020    MCHC 32.7 05/29/2020    RDW 15.2 05/29/2020     05/29/2020    MPV 10.3 05/29/2020     CMP:    Lab Results   Component Value Date     05/26/2020    K 4.3 05/26/2020    K 3.2 05/21/2020     05/26/2020    CO2 22 05/26/2020    BUN 13 05/26/2020    CREATININE 1.1

## 2020-05-29 NOTE — PROGRESS NOTES
Physical Therapy Treatment Note    Room #:  7933/9405-82  Patient Name: Miroslava Katz  YOB: 1940  MRN: [id number]    Referring Provider: Paul Davis DO    Date of Service: 5/29/2020    Evaluating Physical Therapist:  Maddi Naranjo, PT #[id number]      Diagnosis:   Sepsis [facility]) [A41.9]        Patient Active Problem List   Diagnosis    Weakness    COPD (chronic obstructive pulmonary disease) ([address]    Enlarged prostate    Pneumonia    Severe protein-calorie malnutrition ([address]    Sepsis ([address]    Stroke due to occlusion of right middle cerebral artery ([address]        Tentative placement recommendation: Subacute rehab    Equipment recommendation:  To be determined      Prior Level of Function: Poor historian   Rehab Potential: good  for baseline    Past medical history:   Past Medical History:   Diagnosis Date    Cerebral artery occlusion with cerebral infarction [facility]     COPD (chronic obstructive pulmonary disease) ([facility]     Enlarged prostate     Hyperlipidemia      Past Surgical History:   Procedure Laterality Date    UPPER GASTROINTESTINAL ENDOSCOPY N/A 3/21/2019    EGD BIOPSY performed by Adrienne Garcia MD at [address] [place] [address]  3/21/2019    EGD DILATION BALLOON performed by Adrienne Garcia MD at [facility] ENDOSCOPY       Precautions: falls, alarm, O2, confusion and tele sitter , HX of CVA with L sided weakness appears to have left neglect     SUBJECTIVE:    Social history: Patient came to us from SNF, pt was recently discharged from the hospital 4 days ago     Equipment owned: unknown,       [facility]   How much difficulty turning over in bed?: A Lot  How much difficulty sitting down on / standing up from a chair with arms?: A Lot  How much difficulty moving from lying on back to sitting on side of bed?: A Lot  How much help from another person moving to and from a bed to a chair?: A Lot  How much help from another person needed to walk in hospital room?: A Lot  How much help from another person for climbing 3-5 steps with a railing?: Total  AM-PAC Inpatient Mobility Raw Score : 11  AM-PAC Inpatient T-Scale Score : 33.86  Mobility Inpatient CMS 0-100% Score: 72.57  Mobility Inpatient CMS G-Code Modifier : CL    Nursing cleared patient for PT treatment. .      OBJECTIVE:   Initial Evaluation  Date: 5/22/2020 Treatment Date:    5/29/2020   Short Term/ Long Term   Goals   Was pt agreeable to Eval/treatment? Yes   yes To be met in 3 days   Pain level   No c/o    0/10    Bed Mobility    Rolling: Moderate assist of 1    Supine to sit: Moderate assist of  2    Sit to supine: Moderate assist of  2    Scooting: Moderate assist of  2   Rolling: Maximal assist of  2    Supine to sit: Maximal assist of  2    Sit to supine: Maximal assist of  2    Scooting: Maximal assist of  2     Rolling: Minimal assist of 1    Supine to sit: Minimal assist of 1    Sit to supine: Minimal assist of 1    Scooting: Minimal assist of 1     Transfers Sit to stand: Maximal assist of  2 with chux Sit to stand: Moderate assist of  2  Stand pivot: Not assessed          Sit to stand:  Moderate assist of 1     Ambulation    1 side steps using  hand held assist with Maximal assist of  2     not assessed      10 feet using  wheeled walker with Moderate assist of 1    Stair negotiation: ascended and descended   Not assessed            ROM Within functional limits      Increase range of motion 10% of affected joints    Strength BUE: refer to OT eval/5  RLE:  3/5  LLE:  2+/5   Increase strength in affected mm groups by 1/3 grade   Balance Sitting EOB:  fair    Dynamic Standing:  poor hand held assist x 2 Sitting EOB:  fair   Dynamic Standing:  fair hand held assist x 2   Sitting EOB:  good -  Dynamic Standing: fair wheeled walker        Patient is Alert & Oriented x person  and follows one step directions    Sensation:  Pt denies numbness and tingling to extremities  Edema:  no Vitals:  Blood Pressure at rest  Blood Pressure post session    Heart Rate at rest  Heart Rate post activity    SPO2 at rest %  SPO2 post activity % recovery      Patient education  Patient educated on role of Physical Therapy, risks of immobility and plan of care and safety      Patient response to education:   Pt verbalized understanding Pt demonstrated skill Pt requires further education in this area   Yes Partial Yes       ASSESSMENT:    Comments:  Pt needing varying assistance from Minimal/Moderate assist d/t posterior lean. Pt able to attain a more upright position with standing and assist x 2. Pt more alert and able to follow directions. Treatment:  Patient practiced and was instructed in the following treatment:      Sat edge of bed 20 minutes with Moderate assist of 1 to increase dynamic sitting balance and activity tolerance. Co-treated with speech therapy and OT; worked on trying to maintain mid-line position and keep his head up and neutral position. Assisted nursing with standing pt for personal hygiene, changing his dressing on his sacrum, and his bed linens d/t incontinent of BM. Pt stood for 4 reps. Therapeutic Exercises: not performed     At end of session, patient in bed with alarm call light and phone within reach,   all lines and tubes intact, nursing notified. Patient would benefit from continued skilled Physical Therapy to improve functional independence and quality of life. PLAN:    Patient is making fair progress towards established goals. Will continue with current Plan of care. Time in  10:06  Time out  10:33    Total Treatment Time  27 minutes    CPT codes:  Therapeutic activities (58722)   27 minutes  2 unit(s)    Rupal Chen  [facility]  LIC # [id number]

## 2020-05-29 NOTE — PLAN OF CARE
Problem: Falls - Risk of:  Goal: Will remain free from falls  Description: Will remain free from falls  Outcome: Met This Shift  Goal: Absence of physical injury  Description: Absence of physical injury  Outcome: Met This Shift     Problem: Infection, Septic Shock:  Goal: Will show no infection signs and symptoms  Description: Will show no infection signs and symptoms  Outcome: Met This Shift     Problem: Pain:  Goal: Pain level will decrease  Description: Pain level will decrease  Outcome: Met This Shift  Goal: Control of acute pain  Description: Control of acute pain  Outcome: Met This Shift  Goal: Control of chronic pain  Description: Control of chronic pain  Outcome: Met This Shift     Problem: Discharge Planning:  Goal: Discharged to appropriate level of care  Description: Discharged to appropriate level of care  Outcome: Not Met This Shift  Note: Not ready for discharge

## 2020-05-30 LAB
ANION GAP SERPL CALCULATED.3IONS-SCNC: 16 MMOL/L (ref 7–16)
BUN BLDV-MCNC: 20 MG/DL (ref 8–23)
CALCIUM SERPL-MCNC: 9 MG/DL (ref 8.6–10.2)
CHLORIDE BLD-SCNC: 108 MMOL/L (ref 98–107)
CO2: 20 MMOL/L (ref 22–29)
CREAT SERPL-MCNC: 1.2 MG/DL (ref 0.7–1.2)
GFR AFRICAN AMERICAN: >60
GFR NON-AFRICAN AMERICAN: 58 ML/MIN/1.73
GLUCOSE BLD-MCNC: 99 MG/DL (ref 74–99)
POTASSIUM SERPL-SCNC: 5.2 MMOL/L (ref 3.5–5)
SODIUM BLD-SCNC: 144 MMOL/L (ref 132–146)

## 2020-05-30 PROCEDURE — 6370000000 HC RX 637 (ALT 250 FOR IP): Performed by: SPECIALIST

## 2020-05-30 PROCEDURE — 6370000000 HC RX 637 (ALT 250 FOR IP): Performed by: INTERNAL MEDICINE

## 2020-05-30 PROCEDURE — 36415 COLL VENOUS BLD VENIPUNCTURE: CPT

## 2020-05-30 PROCEDURE — 2580000003 HC RX 258: Performed by: INTERNAL MEDICINE

## 2020-05-30 PROCEDURE — 6360000002 HC RX W HCPCS: Performed by: INTERNAL MEDICINE

## 2020-05-30 PROCEDURE — 2500000003 HC RX 250 WO HCPCS: Performed by: INTERNAL MEDICINE

## 2020-05-30 PROCEDURE — 2060000000 HC ICU INTERMEDIATE R&B

## 2020-05-30 PROCEDURE — 80048 BASIC METABOLIC PNL TOTAL CA: CPT

## 2020-05-30 RX ORDER — NYSTATIN 100000 U/G
OINTMENT TOPICAL
Qty: 1 TUBE | Refills: 0 | Status: SHIPPED | OUTPATIENT
Start: 2020-05-30

## 2020-05-30 RX ORDER — NYSTATIN 100000 U/G
OINTMENT TOPICAL 2 TIMES DAILY
Status: DISCONTINUED | OUTPATIENT
Start: 2020-05-30 | End: 2020-06-02 | Stop reason: HOSPADM

## 2020-05-30 RX ADMIN — CARBIDOPA AND LEVODOPA 1 TABLET: 25; 100 TABLET ORAL at 15:35

## 2020-05-30 RX ADMIN — AMOXICILLIN AND CLAVULANATE POTASSIUM 1 TABLET: 875; 125 TABLET, FILM COATED ORAL at 18:22

## 2020-05-30 RX ADMIN — MIRTAZAPINE 7.5 MG: 15 TABLET, FILM COATED ORAL at 22:26

## 2020-05-30 RX ADMIN — METOPROLOL TARTRATE 2.5 MG: 5 INJECTION INTRAVENOUS at 05:55

## 2020-05-30 RX ADMIN — NYSTATIN: 100000 OINTMENT TOPICAL at 22:30

## 2020-05-30 RX ADMIN — CARBIDOPA AND LEVODOPA 1 TABLET: 25; 100 TABLET ORAL at 22:26

## 2020-05-30 RX ADMIN — CARBIDOPA AND LEVODOPA 1 TABLET: 25; 100 TABLET ORAL at 10:30

## 2020-05-30 RX ADMIN — AMLODIPINE BESYLATE 5 MG: 5 TABLET ORAL at 10:30

## 2020-05-30 RX ADMIN — AMOXICILLIN AND CLAVULANATE POTASSIUM 1 TABLET: 875; 125 TABLET, FILM COATED ORAL at 05:55

## 2020-05-30 RX ADMIN — METOPROLOL TARTRATE 2.5 MG: 5 INJECTION INTRAVENOUS at 15:35

## 2020-05-30 RX ADMIN — SODIUM CHLORIDE: 9 INJECTION, SOLUTION INTRAVENOUS at 11:07

## 2020-05-30 RX ADMIN — HEPARIN SODIUM 5000 UNITS: 5000 INJECTION, SOLUTION INTRAVENOUS; SUBCUTANEOUS at 10:30

## 2020-05-30 RX ADMIN — METOPROLOL TARTRATE 2.5 MG: 5 INJECTION INTRAVENOUS at 22:22

## 2020-05-30 RX ADMIN — ACETAMINOPHEN 500 MG: 500 TABLET, FILM COATED ORAL at 01:09

## 2020-05-30 RX ADMIN — ASPIRIN 150 MG: 300 SUPPOSITORY RECTAL at 10:29

## 2020-05-30 RX ADMIN — HEPARIN SODIUM 5000 UNITS: 5000 INJECTION, SOLUTION INTRAVENOUS; SUBCUTANEOUS at 22:25

## 2020-05-30 RX ADMIN — NYSTATIN: 100000 OINTMENT TOPICAL at 15:39

## 2020-05-30 RX ADMIN — FERROUS SULFATE TAB 325 MG (65 MG ELEMENTAL FE) 325 MG: 325 (65 FE) TAB at 10:30

## 2020-05-30 ASSESSMENT — PAIN SCALES - GENERAL
PAINLEVEL_OUTOF10: 0
PAINLEVEL_OUTOF10: 0
PAINLEVEL_OUTOF10: 5

## 2020-05-30 ASSESSMENT — PAIN SCALES - PAIN ASSESSMENT IN ADVANCED DEMENTIA (PAINAD)
BODYLANGUAGE: 1
NEGVOCALIZATION: 1
TOTALSCORE: 7
FACIALEXPRESSION: 1
BREATHING: 2
CONSOLABILITY: 2

## 2020-05-30 ASSESSMENT — ENCOUNTER SYMPTOMS: SHORTNESS OF BREATH: 0

## 2020-05-30 ASSESSMENT — PAIN SCALES - WONG BAKER: WONGBAKER_NUMERICALRESPONSE: 0

## 2020-05-30 NOTE — PROGRESS NOTES
Juliann Pham is a [de-identified] y.o. male patient. Pt is doing ok. Awaiting for snf.     Current Facility-Administered Medications   Medication Dose Route Frequency Provider Last Rate Last Dose    0.9 % sodium chloride infusion   Intravenous Continuous Saeid Aashish Parmar MD 50 mL/hr at 05/29/20 1447      amoxicillin-clavulanate (AUGMENTIN) 875-125 MG per tablet 1 tablet  1 tablet Oral 2 times per day Ilda Copeland MD   1 tablet at 05/30/20 0555    carbidopa-levodopa (SINEMET)  MG per tablet 1 tablet  1 tablet Oral TID Alethea Garcia MD   1 tablet at 05/29/20 2051    aspirin suppository 150 mg  150 mg Rectal Daily Heydi Gene, DO   150 mg at 05/29/20 0818    amLODIPine (NORVASC) tablet 5 mg  5 mg Oral Daily Heydi Gene, DO   5 mg at 05/29/20 7344    heparin (porcine) injection 5,000 Units  5,000 Units Subcutaneous BID Heydi Gene, DO   5,000 Units at 05/29/20 2207    hydrALAZINE (APRESOLINE) injection 10 mg  10 mg Intravenous Q4H PRN Heydi Gene, DO        mirtazapine (REMERON) tablet 7.5 mg  7.5 mg Oral Nightly Heydi Gene, DO   7.5 mg at 05/29/20 2051    acetaminophen (TYLENOL) suppository 650 mg  650 mg Rectal Q4H PRN Heydi Gene, DO   650 mg at 05/27/20 1333    metoprolol (LOPRESSOR) injection 2.5 mg  2.5 mg Intravenous Q8H Ramakrishna Gomez, DO   2.5 mg at 05/30/20 0555    sodium chloride flush 0.9 % injection 10 mL  10 mL Intravenous 2 times per day Heydi Gene, DO   10 mL at 05/29/20 2054    sodium chloride flush 0.9 % injection 10 mL  10 mL Intravenous PRN Heydi Gene, DO   10 mL at 05/27/20 2537    ferrous sulfate (IRON 325) tablet 325 mg  325 mg Oral Daily with breakfast Heydi Gene, DO   325 mg at 05/29/20 0816    acetaminophen (TYLENOL) tablet 500 mg  500 mg Oral Q6H PRN Heydi Gene, DO   500 mg at 05/30/20 0109    ondansetron (ZOFRAN) injection 4 mg  4 mg Intravenous Q6H PRN Heydi Rivera,         magnesium hydroxide (MILK OF MAGNESIA) 400

## 2020-05-30 NOTE — PROGRESS NOTES
Plan for skilled time at SNF- will withdraw hospice consult for now. Please notify [facility] if anything changes and family opts to transition to hospice, [phone].

## 2020-05-30 NOTE — PLAN OF CARE
Problem: Falls - Risk of:  Goal: Will remain free from falls  Description: Will remain free from falls  Outcome: Met This Shift  Goal: Absence of physical injury  Description: Absence of physical injury  Outcome: Met This Shift     Problem: Discharge Planning:  Goal: Discharged to appropriate level of care  Description: Discharged to appropriate level of care  Outcome: Not Met This Shift     Problem: Infection, Septic Shock:  Goal: Will show no infection signs and symptoms  Description: Will show no infection signs and symptoms  Outcome: Met This Shift     Problem: Pain:  Goal: Pain level will decrease  Description: Pain level will decrease  Outcome: Met This Shift  Goal: Control of acute pain  Description: Control of acute pain  Outcome: Met This Shift  Goal: Control of chronic pain  Description: Control of chronic pain  Outcome: Met This Shift

## 2020-05-31 PROCEDURE — 2580000003 HC RX 258: Performed by: INTERNAL MEDICINE

## 2020-05-31 PROCEDURE — 6360000002 HC RX W HCPCS: Performed by: INTERNAL MEDICINE

## 2020-05-31 PROCEDURE — 6370000000 HC RX 637 (ALT 250 FOR IP): Performed by: INTERNAL MEDICINE

## 2020-05-31 PROCEDURE — 94761 N-INVAS EAR/PLS OXIMETRY MLT: CPT

## 2020-05-31 PROCEDURE — 97530 THERAPEUTIC ACTIVITIES: CPT

## 2020-05-31 PROCEDURE — 2500000003 HC RX 250 WO HCPCS: Performed by: INTERNAL MEDICINE

## 2020-05-31 PROCEDURE — 2060000000 HC ICU INTERMEDIATE R&B

## 2020-05-31 PROCEDURE — 6370000000 HC RX 637 (ALT 250 FOR IP): Performed by: SPECIALIST

## 2020-05-31 RX ADMIN — CARBIDOPA AND LEVODOPA 1 TABLET: 25; 100 TABLET ORAL at 10:35

## 2020-05-31 RX ADMIN — FERROUS SULFATE TAB 325 MG (65 MG ELEMENTAL FE) 325 MG: 325 (65 FE) TAB at 10:34

## 2020-05-31 RX ADMIN — AMLODIPINE BESYLATE 5 MG: 5 TABLET ORAL at 10:35

## 2020-05-31 RX ADMIN — MIRTAZAPINE 7.5 MG: 15 TABLET, FILM COATED ORAL at 21:19

## 2020-05-31 RX ADMIN — SODIUM CHLORIDE: 9 INJECTION, SOLUTION INTRAVENOUS at 04:11

## 2020-05-31 RX ADMIN — METOPROLOL TARTRATE 2.5 MG: 5 INJECTION INTRAVENOUS at 21:19

## 2020-05-31 RX ADMIN — HEPARIN SODIUM 5000 UNITS: 5000 INJECTION, SOLUTION INTRAVENOUS; SUBCUTANEOUS at 11:00

## 2020-05-31 RX ADMIN — AMOXICILLIN AND CLAVULANATE POTASSIUM 1 TABLET: 875; 125 TABLET, FILM COATED ORAL at 04:57

## 2020-05-31 RX ADMIN — CARBIDOPA AND LEVODOPA 1 TABLET: 25; 100 TABLET ORAL at 21:19

## 2020-05-31 RX ADMIN — NYSTATIN: 100000 OINTMENT TOPICAL at 21:25

## 2020-05-31 RX ADMIN — METOPROLOL TARTRATE 2.5 MG: 5 INJECTION INTRAVENOUS at 04:57

## 2020-05-31 RX ADMIN — HEPARIN SODIUM 5000 UNITS: 5000 INJECTION, SOLUTION INTRAVENOUS; SUBCUTANEOUS at 21:19

## 2020-05-31 RX ADMIN — METOPROLOL TARTRATE 2.5 MG: 5 INJECTION INTRAVENOUS at 14:00

## 2020-05-31 RX ADMIN — Medication 10 ML: at 09:00

## 2020-05-31 RX ADMIN — Medication 10 ML: at 21:25

## 2020-05-31 RX ADMIN — CARBIDOPA AND LEVODOPA 1 TABLET: 25; 100 TABLET ORAL at 14:01

## 2020-05-31 RX ADMIN — ASPIRIN 150 MG: 300 SUPPOSITORY RECTAL at 10:00

## 2020-05-31 RX ADMIN — AMOXICILLIN AND CLAVULANATE POTASSIUM 1 TABLET: 875; 125 TABLET, FILM COATED ORAL at 18:59

## 2020-05-31 RX ADMIN — NYSTATIN: 100000 OINTMENT TOPICAL at 10:00

## 2020-05-31 ASSESSMENT — ENCOUNTER SYMPTOMS: SHORTNESS OF BREATH: 0

## 2020-05-31 NOTE — PROGRESS NOTES
9.8 (L) 05/29/2020    HCT 30.0 (L) 05/29/2020    MCV 86.5 05/29/2020     (H) 05/29/2020     No results found for: CRPHS  Lab Results   Component Value Date    ALT 15 05/22/2020    AST 29 05/22/2020    ALKPHOS 71 05/22/2020    BILITOT 0.3 05/22/2020     Lab Results   Component Value Date     05/30/2020    K 5.2 05/30/2020    K 3.2 05/21/2020     05/30/2020    CO2 20 05/30/2020    BUN 20 05/30/2020    CREATININE 1.2 05/30/2020    CREATININE 1.1 05/26/2020    CREATININE 1.1 05/24/2020    GFRAA >60 05/30/2020    LABGLOM 58 05/30/2020    GLUCOSE 99 05/30/2020    PROT 7.1 05/22/2020    LABALBU 2.4 05/22/2020    CALCIUM 9.0 05/30/2020    BILITOT 0.3 05/22/2020    ALKPHOS 71 05/22/2020    AST 29 05/22/2020    ALT 15 05/22/2020     Lab Results   Component Value Date    CRP 34.1 (H) 05/21/2020     Lab Results   Component Value Date    SEDRATE 117 (H) 05/22/2020    SEDRATE 87 (H) 03/17/2019       Microbiology:   No results for input(s): COVID19 in the last 72 hours. 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 Thank you for involving me in the care of [place] will continue to follow. Please do not hesitate to call for any questions or concerns.     Electronically signed by Kendra Krueger MD on 5/31/2020 at 11:32 AM     5/21/2020

## 2020-05-31 NOTE — PROGRESS NOTES
Physical Therapy Treatment Note    Room #:  9820/5472-52  Patient Name: Vianney Rosales  YOB: 1940  MRN: [id number]    Referring Provider: Suezanne Heimlich, DO    Date of Service: 5/31/2020    Evaluating Physical Therapist:  Johanny Brown, PT #[id number]      Diagnosis:   Sepsis [facility]) [A41.9]        Patient Active Problem List   Diagnosis    Weakness    COPD (chronic obstructive pulmonary disease) ([address]    Enlarged prostate    Pneumonia    Severe protein-calorie malnutrition ([address]    Sepsis ([address]    Stroke due to occlusion of right middle cerebral artery ([address]        Tentative placement recommendation: Subacute rehab    Equipment recommendation:  To be determined      Prior Level of Function: Poor historian   Rehab Potential: good  for baseline    Past medical history:   Past Medical History:   Diagnosis Date    Cerebral artery occlusion with cerebral infarction ([address]     COPD (chronic obstructive pulmonary disease) ([address]     Enlarged prostate     Hyperlipidemia      Past Surgical History:   Procedure Laterality Date    UPPER GASTROINTESTINAL ENDOSCOPY N/A 3/21/2019    EGD BIOPSY performed by Jignesh Crowley MD at [facility]  3/21/2019    EGD DILATION BALLOON performed by Jignesh Crowley MD at [facility] ENDOSCOPY       Precautions: falls, alarm, O2, confusion and tele sitter , HX of CVA with L sided weakness appears to have left neglect     SUBJECTIVE:    Social history: Patient came to us from SNF, pt was recently discharged from the hospital 4 days ago     Equipment owned: unknown,       [facility]   How much difficulty turning over in bed?: A Lot  How much difficulty sitting down on / standing up from a chair with arms?: A Lot  How much difficulty moving from lying on back to sitting on side of bed?: A Lot  How much help from another person moving to and from a bed to a chair?: A Lot  How much help from another person Vitals:  Blood Pressure at rest  Blood Pressure post session    Heart Rate at rest  Heart Rate post activity    SPO2 at rest %  SPO2 post activity % recovery      Patient education  Patient educated on role of Physical Therapy, risks of immobility and plan of care and safety      Patient response to education:   Pt verbalized understanding Pt demonstrated skill Pt requires further education in this area   Yes Partial Yes       ASSESSMENT:    Comments:  Pt needing Maximal assist for sitting balance d/t R lateral lean. Treatment:  Patient practiced and was instructed in the following treatment:      Sat edge of bed 10 minutes with Maximal assist of 1 to increase dynamic sitting balance and activity tolerance. Worked on trying to maintain mid-line position and his head up. Therapeutic Exercises: not performed     At end of session, patient in bed with alarm call light and phone within reach,   all lines and tubes intact, nursing notified. Patient would benefit from continued skilled Physical Therapy to improve functional independence and quality of life. PLAN:    Patient is making limited progress towards established goals. Will continue with current Plan of care. Time in 2:43  Time out 2:53    Total Treatment Time  10 minutes    CPT codes:  Therapeutic activities (24050)   10 minutes  1 unit(s)    Hunter Chen  [facility]  LIC # [id number]

## 2020-06-01 LAB
ALBUMIN SERPL-MCNC: 2.2 G/DL (ref 3.5–5.2)
ALP BLD-CCNC: 179 U/L (ref 40–129)
ALT SERPL-CCNC: 12 U/L (ref 0–40)
ANION GAP SERPL CALCULATED.3IONS-SCNC: 11 MMOL/L (ref 7–16)
AST SERPL-CCNC: 56 U/L (ref 0–39)
BILIRUB SERPL-MCNC: <0.2 MG/DL (ref 0–1.2)
BUN BLDV-MCNC: 15 MG/DL (ref 8–23)
CALCIUM SERPL-MCNC: 8.5 MG/DL (ref 8.6–10.2)
CHLORIDE BLD-SCNC: 108 MMOL/L (ref 98–107)
CO2: 24 MMOL/L (ref 22–29)
CREAT SERPL-MCNC: 1.2 MG/DL (ref 0.7–1.2)
GFR AFRICAN AMERICAN: >60
GFR NON-AFRICAN AMERICAN: 58 ML/MIN/1.73
GLUCOSE BLD-MCNC: 131 MG/DL (ref 74–99)
POTASSIUM SERPL-SCNC: 3.6 MMOL/L (ref 3.5–5)
SODIUM BLD-SCNC: 143 MMOL/L (ref 132–146)
TOTAL PROTEIN: 7.2 G/DL (ref 6.4–8.3)

## 2020-06-01 PROCEDURE — 6370000000 HC RX 637 (ALT 250 FOR IP): Performed by: SPECIALIST

## 2020-06-01 PROCEDURE — 2060000000 HC ICU INTERMEDIATE R&B

## 2020-06-01 PROCEDURE — 6370000000 HC RX 637 (ALT 250 FOR IP): Performed by: INTERNAL MEDICINE

## 2020-06-01 PROCEDURE — 2580000003 HC RX 258: Performed by: INTERNAL MEDICINE

## 2020-06-01 PROCEDURE — 80053 COMPREHEN METABOLIC PANEL: CPT

## 2020-06-01 PROCEDURE — 6360000002 HC RX W HCPCS: Performed by: INTERNAL MEDICINE

## 2020-06-01 PROCEDURE — 97530 THERAPEUTIC ACTIVITIES: CPT

## 2020-06-01 PROCEDURE — 92526 ORAL FUNCTION THERAPY: CPT

## 2020-06-01 PROCEDURE — 36415 COLL VENOUS BLD VENIPUNCTURE: CPT

## 2020-06-01 PROCEDURE — 2500000003 HC RX 250 WO HCPCS: Performed by: INTERNAL MEDICINE

## 2020-06-01 RX ORDER — AMLODIPINE BESYLATE 5 MG/1
5 TABLET ORAL DAILY
Qty: 30 TABLET | Refills: 3 | Status: SHIPPED | OUTPATIENT
Start: 2020-06-01

## 2020-06-01 RX ORDER — HEPARIN SODIUM 5000 [USP'U]/ML
5000 INJECTION, SOLUTION INTRAVENOUS; SUBCUTANEOUS 2 TIMES DAILY
Qty: 10 VIAL | Refills: 0 | Status: SHIPPED | OUTPATIENT
Start: 2020-06-01

## 2020-06-01 RX ORDER — ASPIRIN 300 MG/1
150 SUPPOSITORY RECTAL DAILY
Qty: 30 SUPPOSITORY | Refills: 3 | Status: SHIPPED | OUTPATIENT
Start: 2020-06-01

## 2020-06-01 RX ADMIN — METOPROLOL TARTRATE 2.5 MG: 5 INJECTION INTRAVENOUS at 04:53

## 2020-06-01 RX ADMIN — METOPROLOL TARTRATE 2.5 MG: 5 INJECTION INTRAVENOUS at 14:43

## 2020-06-01 RX ADMIN — SODIUM CHLORIDE: 9 INJECTION, SOLUTION INTRAVENOUS at 02:59

## 2020-06-01 RX ADMIN — HEPARIN SODIUM 5000 UNITS: 5000 INJECTION, SOLUTION INTRAVENOUS; SUBCUTANEOUS at 10:00

## 2020-06-01 RX ADMIN — SODIUM CHLORIDE, PRESERVATIVE FREE 10 ML: 5 INJECTION INTRAVENOUS at 21:48

## 2020-06-01 RX ADMIN — SODIUM CHLORIDE: 9 INJECTION, SOLUTION INTRAVENOUS at 21:52

## 2020-06-01 RX ADMIN — AMLODIPINE BESYLATE 5 MG: 5 TABLET ORAL at 10:16

## 2020-06-01 RX ADMIN — HEPARIN SODIUM 5000 UNITS: 5000 INJECTION, SOLUTION INTRAVENOUS; SUBCUTANEOUS at 21:33

## 2020-06-01 RX ADMIN — METOPROLOL TARTRATE 2.5 MG: 5 INJECTION INTRAVENOUS at 21:24

## 2020-06-01 RX ADMIN — CARBIDOPA AND LEVODOPA 1 TABLET: 25; 100 TABLET ORAL at 21:25

## 2020-06-01 RX ADMIN — ASPIRIN 150 MG: 300 SUPPOSITORY RECTAL at 10:16

## 2020-06-01 RX ADMIN — NYSTATIN: 100000 OINTMENT TOPICAL at 21:48

## 2020-06-01 RX ADMIN — CARBIDOPA AND LEVODOPA 1 TABLET: 25; 100 TABLET ORAL at 10:00

## 2020-06-01 RX ADMIN — AMOXICILLIN AND CLAVULANATE POTASSIUM 1 TABLET: 875; 125 TABLET, FILM COATED ORAL at 17:00

## 2020-06-01 RX ADMIN — NYSTATIN: 100000 OINTMENT TOPICAL at 10:00

## 2020-06-01 RX ADMIN — FERROUS SULFATE TAB 325 MG (65 MG ELEMENTAL FE) 325 MG: 325 (65 FE) TAB at 09:00

## 2020-06-01 RX ADMIN — MIRTAZAPINE 7.5 MG: 15 TABLET, FILM COATED ORAL at 21:25

## 2020-06-01 RX ADMIN — AMOXICILLIN AND CLAVULANATE POTASSIUM 1 TABLET: 875; 125 TABLET, FILM COATED ORAL at 05:38

## 2020-06-01 RX ADMIN — CARBIDOPA AND LEVODOPA 1 TABLET: 25; 100 TABLET ORAL at 14:43

## 2020-06-01 ASSESSMENT — PAIN SCALES - GENERAL
PAINLEVEL_OUTOF10: 0

## 2020-06-01 ASSESSMENT — PAIN SCALES - PAIN ASSESSMENT IN ADVANCED DEMENTIA (PAINAD)
BREATHING: 0
NEGVOCALIZATION: 0
CONSOLABILITY: 0
BODYLANGUAGE: 0
CONSOLABILITY: 0
FACIALEXPRESSION: 0
FACIALEXPRESSION: 0
TOTALSCORE: 0
BODYLANGUAGE: 0
NEGVOCALIZATION: 0
TOTALSCORE: 0
BREATHING: 0

## 2020-06-01 ASSESSMENT — PAIN SCALES - WONG BAKER: WONGBAKER_NUMERICALRESPONSE: 0

## 2020-06-01 NOTE — PROGRESS NOTES
Palliative Medicine   Progression of Care     Patient accepted at [facility] and [facility]. Bed available on 6/2 per SW note.        Palliative Care following closely for support of patient and family   Yanick MEDINA-DOMENICO, NICOLEP-BC

## 2020-06-01 NOTE — PROGRESS NOTES
needed to walk in hospital room?: A Lot  How much help from another person for climbing 3-5 steps with a railing?: Total  AM-PAC Inpatient Mobility Raw Score : 11  AM-PAC Inpatient T-Scale Score : 33.86  Mobility Inpatient CMS 0-100% Score: 72.57  Mobility Inpatient CMS G-Code Modifier : CL    Nursing cleared patient for PT treatment. .      OBJECTIVE:   Initial Evaluation  Date: 5/22/2020 Treatment Date:    6/1/2020   Short Term/ Long Term   Goals   Was pt agreeable to Eval/treatment? Yes   yes To be met in 3 days   Pain level   No c/o    0/10    Bed Mobility    Rolling: Moderate assist of 1    Supine to sit: Moderate assist of  2    Sit to supine: Moderate assist of  2    Scooting: Moderate assist of  2   Rolling: Maximal assist of 1    Supine to sit: Maximal assist of  2    Sit to supine: Maximal assist of  2    Scooting: Maximal assist of  2     Rolling: Minimal assist of 1    Supine to sit: Minimal assist of 1    Sit to supine: Minimal assist of 1    Scooting: Minimal assist of 1     Transfers Sit to stand: Maximal assist of  2 with chux Sit to stand: Attempted but could not get the pt off of the bed. Stand pivot: Not assessed          Sit to stand:  Moderate assist of 1     Ambulation    1 side steps using  hand held assist with Maximal assist of  2     not assessed      10 feet using  wheeled walker with Moderate assist of 1    Stair negotiation: ascended and descended   Not assessed            ROM Within functional limits      Increase range of motion 10% of affected joints    Strength BUE: refer to OT eval/5  RLE:  3/5  LLE:  2+/5   Increase strength in affected mm groups by 1/3 grade   Balance Sitting EOB:  fair    Dynamic Standing:  poor hand held assist x 2 Sitting EOB:  poor R lateral/posterior lean  Dynamic Standing:  not assessed   Sitting EOB:  good -  Dynamic Standing: fair wheeled walker        Patient is Alert & Oriented x person  and follows one step directions    Sensation:  Pt denies numbness and tingling to extremities  Edema:  no      Vitals:  Blood Pressure at rest  Blood Pressure post session    Heart Rate at rest  Heart Rate post activity    SPO2 at rest %  SPO2 post activity % recovery      Patient education  Patient educated on role of Physical Therapy, risks of immobility and plan of care and safety      Patient response to education:   Pt verbalized understanding Pt demonstrated skill Pt requires further education in this area   Yes Partial Yes       ASSESSMENT:    Comments:  Pt needing Maximal assist for sitting balance d/t R lateral/posterior lean. Attempted 1 sit to stand transfer but could not get him off of the bed d/t no volitional movement. Treatment:  Patient practiced and was instructed in the following treatment:    Pt transferred to edge of bed. Sat edge of bed 15 minutes with Maximal assist of 1 to increase dynamic sitting balance and activity tolerance. Worked on trying to maintain mid-line position and his head up. Therapeutic Exercises: not performed     At end of session, patient in bed with alarm call light and phone within reach,   all lines and tubes intact, nursing notified. Patient would benefit from continued skilled Physical Therapy to improve functional independence and quality of life. PLAN:    Patient is making limited progress towards established goals. Will continue with current Plan of care. Time in 3:37  Time out 4:01    Total Treatment Time  24 minutes    CPT codes:  Therapeutic activities (86774)   24 minutes  2 unit(s)    William Chen  [facility]  LIC # [id number]

## 2020-06-02 VITALS
DIASTOLIC BLOOD PRESSURE: 88 MMHG | SYSTOLIC BLOOD PRESSURE: 138 MMHG | OXYGEN SATURATION: 92 % | HEIGHT: 64 IN | HEART RATE: 118 BPM | RESPIRATION RATE: 18 BRPM | WEIGHT: 121 LBS | TEMPERATURE: 99.5 F | BODY MASS INDEX: 20.66 KG/M2

## 2020-06-02 PROBLEM — G20 PARKINSON DISEASE (HCC): Status: ACTIVE | Noted: 2020-06-02

## 2020-06-02 LAB
BASOPHILS ABSOLUTE: 0 E9/L (ref 0–0.2)
BASOPHILS RELATIVE PERCENT: 0 % (ref 0–2)
EOSINOPHILS ABSOLUTE: 0 E9/L (ref 0.05–0.5)
EOSINOPHILS RELATIVE PERCENT: 0 % (ref 0–6)
HCT VFR BLD CALC: 29.6 % (ref 37–54)
HEMOGLOBIN: 9.2 G/DL (ref 12.5–16.5)
HYPOCHROMIA: ABNORMAL
LYMPHOCYTES ABSOLUTE: 1.18 E9/L (ref 1.5–4)
LYMPHOCYTES RELATIVE PERCENT: 6 % (ref 20–42)
MCH RBC QN AUTO: 27.9 PG (ref 26–35)
MCHC RBC AUTO-ENTMCNC: 31.1 % (ref 32–34.5)
MCV RBC AUTO: 89.7 FL (ref 80–99.9)
METAMYELOCYTES RELATIVE PERCENT: 3 % (ref 0–1)
MONOCYTES ABSOLUTE: 0.78 E9/L (ref 0.1–0.95)
MONOCYTES RELATIVE PERCENT: 4 % (ref 2–12)
NEUTROPHILS ABSOLUTE: 17.64 E9/L (ref 1.8–7.3)
NEUTROPHILS RELATIVE PERCENT: 87 % (ref 43–80)
PDW BLD-RTO: 15.2 FL (ref 11.5–15)
PLATELET # BLD: 596 E9/L (ref 130–450)
PMV BLD AUTO: 10 FL (ref 7–12)
RBC # BLD: 3.3 E12/L (ref 3.8–5.8)
SARS-COV-2, NAAT: NOT DETECTED
WBC # BLD: 19.6 E9/L (ref 4.5–11.5)

## 2020-06-02 PROCEDURE — U0002 COVID-19 LAB TEST NON-CDC: HCPCS

## 2020-06-02 PROCEDURE — 6360000002 HC RX W HCPCS: Performed by: INTERNAL MEDICINE

## 2020-06-02 PROCEDURE — 97535 SELF CARE MNGMENT TRAINING: CPT

## 2020-06-02 PROCEDURE — 2580000003 HC RX 258: Performed by: INTERNAL MEDICINE

## 2020-06-02 PROCEDURE — 6370000000 HC RX 637 (ALT 250 FOR IP): Performed by: SPECIALIST

## 2020-06-02 PROCEDURE — 2500000003 HC RX 250 WO HCPCS: Performed by: INTERNAL MEDICINE

## 2020-06-02 PROCEDURE — 6370000000 HC RX 637 (ALT 250 FOR IP): Performed by: INTERNAL MEDICINE

## 2020-06-02 PROCEDURE — 85025 COMPLETE CBC W/AUTO DIFF WBC: CPT

## 2020-06-02 PROCEDURE — 97530 THERAPEUTIC ACTIVITIES: CPT

## 2020-06-02 PROCEDURE — 92526 ORAL FUNCTION THERAPY: CPT | Performed by: SPEECH-LANGUAGE PATHOLOGIST

## 2020-06-02 PROCEDURE — 36415 COLL VENOUS BLD VENIPUNCTURE: CPT

## 2020-06-02 RX ADMIN — HEPARIN SODIUM 5000 UNITS: 5000 INJECTION, SOLUTION INTRAVENOUS; SUBCUTANEOUS at 09:00

## 2020-06-02 RX ADMIN — AMLODIPINE BESYLATE 5 MG: 5 TABLET ORAL at 09:00

## 2020-06-02 RX ADMIN — Medication 10 ML: at 09:00

## 2020-06-02 RX ADMIN — CARBIDOPA AND LEVODOPA 1 TABLET: 25; 100 TABLET ORAL at 13:56

## 2020-06-02 RX ADMIN — METOPROLOL TARTRATE 2.5 MG: 5 INJECTION INTRAVENOUS at 05:42

## 2020-06-02 RX ADMIN — AMOXICILLIN AND CLAVULANATE POTASSIUM 1 TABLET: 875; 125 TABLET, FILM COATED ORAL at 05:42

## 2020-06-02 RX ADMIN — ASPIRIN 150 MG: 300 SUPPOSITORY RECTAL at 09:00

## 2020-06-02 RX ADMIN — NYSTATIN: 100000 OINTMENT TOPICAL at 09:00

## 2020-06-02 RX ADMIN — CARBIDOPA AND LEVODOPA 1 TABLET: 25; 100 TABLET ORAL at 09:00

## 2020-06-02 RX ADMIN — FERROUS SULFATE TAB 325 MG (65 MG ELEMENTAL FE) 325 MG: 325 (65 FE) TAB at 09:00

## 2020-06-02 ASSESSMENT — PAIN SCALES - GENERAL: PAINLEVEL_OUTOF10: 0

## 2020-06-02 NOTE — PROGRESS NOTES
Palliative Medicine   Progression of Care     Patient's report called to skilled nursing facility per [employer]. No needs for Palliative Care. Palliative  Care will sign off at this time. Thank you.        Palliative Care following closely for support of patient and family   Vitaliy MEDINA-CNP, AGAJOSEP-BC

## 2020-06-02 NOTE — PROGRESS NOTES
and tingling to extremities  Edema:  no      Vitals:  Blood Pressure at rest  Blood Pressure post session    Heart Rate at rest  Heart Rate post activity    SPO2 at rest %  SPO2 post activity % recovery      Patient education  Patient educated on role of Physical Therapy, risks of immobility and plan of care and safety      Patient response to education:   Pt verbalized understanding Pt demonstrated skill Pt requires further education in this area   Yes Partial Yes       ASSESSMENT:    Comments:  Pt needing varying assist levels ranging from Moderate to Minimal assist for sitting balance d/t R lateral/posterior lean. Pt did not want to try and stand up this am.    Treatment:  Patient practiced and was instructed in the following treatment:    Pt transferred to edge of bed. Sat edge of bed 20 minutes with Moderate assist of 1 to increase dynamic sitting balance and activity tolerance. Worked on trying to maintain mid-line position and his head up. Therapeutic Exercises: not performed     At end of session, patient in bed with alarm call light and phone within reach,   all lines and tubes intact, nursing notified. Patient would benefit from continued skilled Physical Therapy to improve functional independence and quality of life. PLAN:    Patient is making limited progress towards established goals. Will continue with current Plan of care. Time in 10:03  Time out 10:26    Total Treatment Time  23 minutes    CPT codes:  Therapeutic activities (29217)   23 minutes  2 unit(s)    Kate Chen  [facility]  LIC # [id number]

## 2020-06-02 NOTE — PROGRESS NOTES
Patient ID:  Maxine Dee  [id number]  68 y.o.  1940    HPI:  Patient denies any new complains,needs assistance in feeding ,good urine out put. . Questions, answers, and tests reviewed. ROS:  Cardiovascular:   Denies any chest pain, irregular heartbeats, or palpitations. Respiratory:   Denies shortness of breath, coughing, sputum production, hemoptysis, or wheezing. Gastrointestinal:   Denies nausea, vomiting, diarrhea, or constipation. Denies any abdominal pain. Extremities:   Denies any lower extremity swelling or edema. Neurology:    Denies any headache or focal neurological deficits. No weakness or paresthesia. Derm:    Denies any rashes, ulcers, or excoriations. Denies bruising. Genitourinary:    Patient has davis cathter. Physical Exam:    Vitals:    06/01/20 1759   BP: 126/72   Pulse: 110   Resp: 20   Temp: 97.8 °F (36.6 °C)   SpO2:        HEENT:  PERRLA. EOMI. Sclera clear. Buccal mucosa moist.    Neck:  Supple. Trachea midline. No thyromegaly. No JVD. No bruits. Heart:  Rhythm regular, rate controlled. No murmurs. Lungs:  Symmetrical. Clear to auscultation bilaterally. No wheezes. No rhonchi. No rales. Abdomen: Soft. Non-tender. Non-distended. Bowel sounds positive. No organomegaly or masses. No pain on palpation    Extremities:  Peripheral pulses present. No peripheral edema. No ulcers. Neurologic:  Alert x 3. No focal deficit. Cranial nerves grossly intact. Skin:  No petechia. No hemorrhage. No wounds.     Labs:  CBC:   Lab Results   Component Value Date    WBC 16.8 05/29/2020    RBC 3.47 05/29/2020    HGB 9.8 05/29/2020    HCT 30.0 05/29/2020    MCV 86.5 05/29/2020    MCH 28.2 05/29/2020    MCHC 32.7 05/29/2020    RDW 15.2 05/29/2020     05/29/2020    MPV 10.3 05/29/2020     CMP:    Lab Results   Component Value Date     06/01/2020    K 3.6 06/01/2020    K 3.2 05/21/2020     06/01/2020    CO2 24 06/01/2020    BUN 15 06/01/2020    CREATININE 1.2 06/01/2020    GFRAA >60 06/01/2020    LABGLOM 58 06/01/2020    GLUCOSE 131 06/01/2020    PROT 7.2 06/01/2020    LABALBU 2.2 06/01/2020    CALCIUM 8.5 06/01/2020    BILITOT <0.2 06/01/2020    ALKPHOS 179 06/01/2020    AST 56 06/01/2020    ALT 12 06/01/2020     PT/INR:    Lab Results   Component Value Date    PROTIME 13.8 03/20/2019    INR 1.2 03/20/2019         FL Modified Barium Swallow W Video   Final Result   Laryngeal penetration with sips of nectar consistency barium from a cup. No   evidence of dorene aspiration. Please see separate speech pathology report for full discussion of findings   and recommendations. XR HAND RIGHT (2 VIEWS)   Final Result   1. Soft tissue swelling. No acute osseous abnormality. 2. Degenerative changes as described. US CAROTID ARTERY BILATERAL   Final Result   There is diffuse mixed calcified atherosclerotic plaque throughout the   bilateral common, internal and external carotid arteries. Although the   ICA/CCA PSV ratios are less than 2, the velocities are decreased throughout   the internal carotid arteries suggesting high-grade luminal narrowing. Bilateral vertebral arteries are patent with flow in the normal direction. CT Head WO Contrast   Final Result   1. Large area of hypoattenuation involving the right occipital, parietal,   temporal, and frontal lobes which is new compared with CT head from May 14,   2020 and is most consistent with infarction. MRI brain could be obtained for   further evaluation. 2. Chronic microvascular ischemic changes and global cerebral atrophy. The findings were sent to the Radiology Results [address] at 5:04   pm on 5/23/2020to be communicated to a licensed caregiver. CT Chest WO Contrast   Final Result   No significant change in appearance of the lungs with findings likely related   to chronic interstitial lung disease.          XR CHEST PORTABLE   Final Result   No acute cardiopulmonary

## 2020-06-02 NOTE — PROGRESS NOTES
Speech Language Pathology      NAME:  Brittany Emerson  :  1940  DATE: 2020  ROOM:  0621/0621-01    Patient seen for swallow therapy 10 minutes. Patient being fed by staff opens mouth readily to spoon presentations. No cough during intake however when there was a delay in the next bite to wipe patient's mouth he did exhibit a cough. Discussed with staff assisting to increase verbal cues for patient to clear oral cavity to decrease pocketing and risk of his poor oral control contributing to airway compromise.   Patient was able to implement the verbal cues during the session with less latent coughing will continue POC    Sepsis [facility]) [A41.9]    [address]  dysphagia tx    Yashira Nj MSCCC/SLP  Speech Language Pathologist  [phone]

## 2020-06-02 NOTE — PROGRESS NOTES
neglect                  Glasses: no   Edema: no    Sensation: intact   Hand Dominance:  Left     X  Right       Left Right Comment   Passive range of motion [facility]      Active range of motion Limited in all planes   WFL      Muscle Grade 2/5 3/5     /pinch Strength Intact  Intact      Additional Information: Good  and wfl FMC/dexterity noted during ADL tasks Good  and wfl FMC/dexterity noted during ADL tasks        Functional Assessment:    Initial Eval Status  Date: 5/22/2020 Treatment Status  Date: 6/2/2020 STGs = LTGs  Time frame: 5-7 days   Feeding Minimal Assist   Maximal Assist  HOHA for managing utensils. Independent    Grooming Maximal Assist   Maximal Assist  Cleaning face while sitting EOB Minimal Assist    UB Dressing Maximal Assist to doff and don hospital gown   Maximal Assist  Pull sleeves to shoulders while sitting EOB Minimal Assist    LB Dressing Dependent   Maximal Assist  Don/doff socks Minimal Assist    Bathing Maximal Assist to sponge bathe as pt was incontinent of bowel   NT Minimal Assist    Toileting Dependent  NT Minimal Assist    Bed Mobility  Supine to sit: Moderate Assist x 2  Scooting: Moderate Assist s 2  Sit to supine: Moderate Assist x 2  Rolling: Moderate Assist   Supine to sit: Maximal Assist x 2  Scooting:Maximal Assist s 2  Sit to supine: Maximal Assist x 2  Rolling: Moderate Assist  Supine to sit: Minimal Assist   Sit to supine: Minimal Assist    Functional Transfers Maximal Assist x 2 from EOB   Maximal Assist of 2  Sit<>stand 2x from bed Minimal Assist    Functional Mobility Maximal assist x 2 with hand held assist x 2   NT  Unable to advance feet and maintain balance    Minimal Assist    Activity Tolerance Fair   Fair    Good          Comments:   Nursing approved therapy session. Upon arrival, patient supine in bed and agreeable to OT session with PT collaboration. Therapist educated pt on role of OT.   At end of session, patient supine in bed with call light and

## 2020-06-02 NOTE — PLAN OF CARE
Problem: Falls - Risk of:  Goal: Will remain free from falls  Description: Will remain free from falls  Outcome: Met This Shift  Goal: Absence of physical injury  Description: Absence of physical injury  Outcome: Met This Shift     Problem: Discharge Planning:  Goal: Discharged to appropriate level of care  Description: Discharged to appropriate level of care  Outcome: Met This Shift     Problem: Infection, Septic Shock:  Goal: Will show no infection signs and symptoms  Description: Will show no infection signs and symptoms  Outcome: Met This Shift     Problem: Pain:  Goal: Pain level will decrease  Description: Pain level will decrease  Outcome: Met This Shift  Goal: Control of acute pain  Description: Control of acute pain  Outcome: Met This Shift  Goal: Control of chronic pain  Description: Control of chronic pain  Outcome: Met This Shift

## 2020-06-03 NOTE — DISCHARGE SUMMARY
PROVIDED HISTORY: Right wrist pain and swelling TECHNOLOGIST PROVIDED HISTORY: Portable Reason for exam:->Right wrist pain and swelling Initial evaluation FINDINGS: Diffuse soft tissue swelling. No erosions. 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  Mild triscaphe and 1st CMC joint osteoarthritis. No acute periostitis or bony destruction. 1. Soft tissue swelling. No acute osseous abnormality. 2. Degenerative changes as described. Ct Head Wo Contrast    Result Date: 5/23/2020  EXAMINATION: CT OF THE HEAD WITHOUT CONTRAST  5/23/2020 2:58 pm TECHNIQUE: CT of the head was performed without the administration of intravenous contrast. Dose modulation, iterative reconstruction, and/or weight based adjustment of the mA/kV was utilized to reduce the radiation dose to as low as reasonably achievable. COMPARISON: CT head May 14, 2020 HISTORY: ORDERING SYSTEM PROVIDED HISTORY: Altered mental status, lethargy TECHNOLOGIST PROVIDED HISTORY: Has a \"code stroke\" or \"stroke alert\" been called? ->No Reason for exam:->Altered mental status, lethargy FINDINGS: BRAIN/VENTRICLES: There is no acute intracranial hemorrhage, mass effect or midline shift. No abnormal extra-axial fluid collection. Interval development of large region of hypoattenuation involving the right occipital, temporal, frontal, and parietal lobes which is new when compared with CT head from May 14, 2020 and is most compatible with infarcts. MRI brain could be obtained for complete evaluation. There is no evidence of hydrocephalus. Remote lacunar infarction involving the right basal ganglia. There is mild periventricular and subcortical white matter hypoattenuation most consistent with microvascular ischemic changes. There is mild age appropriate global cerebral atrophy.   Severe atherosclerotic changes of respectively. The right internal carotid artery demonstrates the systolic velocities of 4, 2 and 2 cm/sec in the proximal, mid and distal segments respectively. The external carotid artery is patent. The vertebral artery demonstrates normal antegrade flow. Diffuse mixed calcified plaque throughout the common, internal and external carotid arteries. ICA/CCA ratio of 1.0. LEFT: The left common carotid artery demonstrates peak systolic velocities of 475, 172 and 150 cm/sec in the proximal and distal segments respectively. The left internal carotid artery demonstrates the systolic velocities of 7, 6 and 15 cm/sec in the proximal, mid and distal segments respectively. The external carotid artery is patent. The vertebral artery demonstrates normal antegrade flow. Diffuse mixed calcified atherosclerotic plaque throughout the common, internal and external carotid arteries. ICA/CCA ratio of 0.5. There is diffuse mixed calcified atherosclerotic plaque throughout the bilateral common, internal and external carotid arteries. Although the ICA/CCA PSV ratios are less than 2, the velocities are decreased throughout the internal carotid arteries suggesting high-grade luminal narrowing. Bilateral vertebral arteries are patent with flow in the normal direction. Treatments: Empiric IV antibiotic, video swallow study, aspirin    Discharge Exam:  Patient awake alert cooperative, vital signs stable, neck no JVD no lymphadenopathy, patient has De Santiago catheter, lungs clear to auscultation, normal heart sounds, abdomen soft nontender, extremity no edema. Disposition: [address]     Patient Instructions:      Medication List      START taking these medications    acidophilus  Take 1 packet by mouth 2 times daily     amoxicillin-clavulanate 875-125 MG per tablet  Commonly known as:  AUGMENTIN  Take 1 tablet by mouth every 12 hours for 10 days     aspirin 300 MG suppository  Place 0.5 suppositories rectally daily

## (undated) DEVICE — LUBRICANT SURG JELLY ST BACTER TUBE 4.25OZ

## (undated) DEVICE — MASK,FACE,MAXFLUIDPROTECT,SHIELD/ERLPS: Brand: MEDLINE

## (undated) DEVICE — SYRINGE INFL 60ML DISP ALLIANCE II

## (undated) DEVICE — GOWN ISOLATN REG YEL M WT MULTIPLY SIDETIE LEV 2

## (undated) DEVICE — BLOCK BITE 60FR CAREGUARD

## (undated) DEVICE — 6 X 9  1.75MIL 4-WALL LABGUARD: Brand: MINIGRIP COMMERCIAL LLC

## (undated) DEVICE — KENDALL 450 SERIES MONITORING FOAM ELECTRODE - RECTANGULAR SHAPE ( 3/PK): Brand: KENDALL

## (undated) DEVICE — FORCEPS BX L240CM JAW DIA2.8MM L CAP W/ NDL MIC MESH TOOTH

## (undated) DEVICE — Device: Brand: DEFENDO VALVE AND CONNECTOR KIT

## (undated) DEVICE — ESOPHAGEAL/PYLORIC/COLONIC/BILIARY WIREGUIDED BALLOON DILATATION CATHETER: Brand: CRE™ PRO

## (undated) DEVICE — YANKAUER,BULB TIP,W/O VENT,RIGID,STERILE: Brand: MEDLINE

## (undated) DEVICE — TOWEL,OR,DSP,ST,BLUE,STD,6/PK,12PK/CS: Brand: MEDLINE

## (undated) DEVICE — CONTAINER SPEC COLL 960ML POLYPR TRIANG GRAD INTAKE/OUTPUT

## (undated) DEVICE — KIT BEDSIDE REVITAL OX 500ML

## (undated) DEVICE — TUBING, SUCTION, 1/4" X 10', STRAIGHT: Brand: MEDLINE

## (undated) DEVICE — SPONGE GZ 4IN 4IN 4 PLY N WVN AVANT